# Patient Record
Sex: MALE | Race: WHITE | NOT HISPANIC OR LATINO | Employment: OTHER | URBAN - METROPOLITAN AREA
[De-identification: names, ages, dates, MRNs, and addresses within clinical notes are randomized per-mention and may not be internally consistent; named-entity substitution may affect disease eponyms.]

---

## 2023-03-29 ENCOUNTER — TELEPHONE (OUTPATIENT)
Dept: PHARMACY | Facility: HOSPITAL | Age: 68
End: 2023-03-29
Payer: MEDICARE

## 2023-03-29 RX ORDER — LOSARTAN POTASSIUM 50 MG/1
100 TABLET ORAL DAILY
COMMUNITY
End: 2023-09-14 | Stop reason: SDUPTHER

## 2023-03-29 RX ORDER — METFORMIN HYDROCHLORIDE 500 MG/1
1000 TABLET, EXTENDED RELEASE ORAL 2 TIMES DAILY
COMMUNITY
End: 2023-09-14 | Stop reason: SDUPTHER

## 2023-03-29 RX ORDER — GLIMEPIRIDE 4 MG/1
8 TABLET ORAL
COMMUNITY
End: 2023-09-14 | Stop reason: SDUPTHER

## 2023-03-29 NOTE — TELEPHONE ENCOUNTER
Toby Hicks was referred to Clinical Pharmacy Team to complete a post-discharge medication optimization and monitoring visit. The patient was referred for their diabetes.     Referring Provider: Anand Adkins MD    Reached out to patient for follow-up after last visit with Pharmacy Team on 12/30/22. Patient expressed that he is currently only on 3 medications (Metformin, Glimepiride and Losartan) and that he is no longer on Lantus. He was not able to provide blood sugar readings as he does not have any test strips. Patient politely declined Pharmacy Team services and for us to send refills for testing supplies at this time.     Please have patient contact Pharmacy Team for further assistance with diabetes management, if required.    Thank you,    Judy Montes, PharmD  PGY-1 Pharmacy Resident  Ph: [phone]

## 2023-03-30 ENCOUNTER — TELEPHONE (OUTPATIENT)
Dept: PRIMARY CARE | Facility: CLINIC | Age: 68
End: 2023-03-30
Payer: MEDICARE

## 2023-03-30 DIAGNOSIS — B02.9 HERPES ZOSTER WITHOUT COMPLICATION: Primary | ICD-10-CM

## 2023-03-30 RX ORDER — VALACYCLOVIR HYDROCHLORIDE 1 G/1
1000 TABLET, FILM COATED ORAL 3 TIMES DAILY
Qty: 21 TABLET | Refills: 0 | Status: SHIPPED | OUTPATIENT
Start: 2023-03-30 | End: 2023-04-06

## 2023-03-30 NOTE — TELEPHONE ENCOUNTER
I reviewed the progress note and agree with the resident’s findings and plans as written. Case discussed with resident.     Kiara Patrick, VincentD

## 2023-03-30 NOTE — TELEPHONE ENCOUNTER
PATIENT CALLING, HE IS CURRENTLY GOING THROUGH CHEMO/RADIATION FOR COLON CANCER. HE HAS DEVELOPED SHINGLES AND IS WANTING TO KNOW IF SOMETHING CAN BE SENT INTO THE PHARMACY FOR THE SHINGLES. PLEASE ADVISE.

## 2023-08-01 ENCOUNTER — HOSPITAL ENCOUNTER (OUTPATIENT)
Dept: DATA CONVERSION | Facility: HOSPITAL | Age: 68
End: 2023-08-01
Attending: INTERNAL MEDICINE | Admitting: INTERNAL MEDICINE
Payer: MEDICARE

## 2023-08-01 DIAGNOSIS — R91.1 SOLITARY PULMONARY NODULE: ICD-10-CM

## 2023-08-01 DIAGNOSIS — C18.9 MALIGNANT NEOPLASM OF COLON, UNSPECIFIED (MULTI): ICD-10-CM

## 2023-08-01 DIAGNOSIS — C78.02 SECONDARY MALIGNANT NEOPLASM OF LEFT LUNG (MULTI): ICD-10-CM

## 2023-08-03 LAB
COMPLETE PATHOLOGY REPORT: NORMAL
CONVERTED ADDENDUM DIAGNOSIS 2: NORMAL
CONVERTED CLINICAL DIAGNOSIS-HISTORY: NORMAL
CONVERTED FINAL DIAGNOSIS: NORMAL
CONVERTED FINAL REPORT PDF LINK TO COPY AND PASTE: NORMAL
CONVERTED GROSS DESCRIPTION: NORMAL

## 2023-09-13 PROBLEM — K59.09 CHRONIC CONSTIPATION: Status: ACTIVE | Noted: 2023-09-13

## 2023-09-13 PROBLEM — R35.1 NOCTURIA: Status: ACTIVE | Noted: 2023-09-13

## 2023-09-13 PROBLEM — R10.32 INGUINAL PAIN OF BOTH SIDES: Status: ACTIVE | Noted: 2023-09-13

## 2023-09-13 PROBLEM — S61.221A: Status: ACTIVE | Noted: 2023-09-13

## 2023-09-13 PROBLEM — G47.33 OSA (OBSTRUCTIVE SLEEP APNEA): Status: ACTIVE | Noted: 2023-09-13

## 2023-09-13 PROBLEM — J69.0: Status: ACTIVE | Noted: 2023-09-13

## 2023-09-13 PROBLEM — K76.9 LIVER LESION: Status: ACTIVE | Noted: 2023-09-13

## 2023-09-13 PROBLEM — R09.02 HYPOXIA: Status: ACTIVE | Noted: 2023-09-13

## 2023-09-13 PROBLEM — R53.83 FATIGUE: Status: ACTIVE | Noted: 2023-09-13

## 2023-09-13 PROBLEM — Z85.828 HISTORY OF BASAL CELL CARCINOMA: Status: ACTIVE | Noted: 2023-09-13

## 2023-09-13 PROBLEM — R10.31 INGUINAL PAIN OF BOTH SIDES: Status: ACTIVE | Noted: 2023-09-13

## 2023-09-13 PROBLEM — C78.7 METASTATIC COLON CANCER TO LIVER (MULTI): Status: ACTIVE | Noted: 2023-09-13

## 2023-09-13 PROBLEM — B02.9 SHINGLES: Status: ACTIVE | Noted: 2023-09-13

## 2023-09-13 PROBLEM — M19.049 DEGENERATIVE JOINT DISEASE OF HAND: Status: ACTIVE | Noted: 2023-09-13

## 2023-09-13 PROBLEM — N13.8 BPH WITH OBSTRUCTION/LOWER URINARY TRACT SYMPTOMS: Status: ACTIVE | Noted: 2023-09-13

## 2023-09-13 PROBLEM — M25.512 LEFT SHOULDER PAIN: Status: ACTIVE | Noted: 2023-09-13

## 2023-09-13 PROBLEM — M54.2 NECK PAIN: Status: ACTIVE | Noted: 2023-09-13

## 2023-09-13 PROBLEM — I10 HYPERTENSION: Status: ACTIVE | Noted: 2023-09-13

## 2023-09-13 PROBLEM — J18.9 PNEUMONIA OF BOTH LUNGS DUE TO INFECTIOUS ORGANISM: Status: ACTIVE | Noted: 2023-09-13

## 2023-09-13 PROBLEM — F41.8 DEPRESSION WITH ANXIETY: Status: ACTIVE | Noted: 2023-09-13

## 2023-09-13 PROBLEM — B35.1 ONYCHOMYCOSIS OF TOENAIL: Status: ACTIVE | Noted: 2023-09-13

## 2023-09-13 PROBLEM — R10.9 ABDOMINAL PAIN, ACUTE: Status: ACTIVE | Noted: 2023-09-13

## 2023-09-13 PROBLEM — R91.1 LUNG NODULE: Status: ACTIVE | Noted: 2023-09-13

## 2023-09-13 PROBLEM — R97.20 ELEVATED PSA: Status: ACTIVE | Noted: 2023-09-13

## 2023-09-13 PROBLEM — H91.90 DECREASED HEARING: Status: ACTIVE | Noted: 2023-09-13

## 2023-09-13 PROBLEM — E11.9 DIABETES MELLITUS (MULTI): Status: ACTIVE | Noted: 2023-09-13

## 2023-09-13 PROBLEM — C18.9 METASTATIC COLON CANCER TO LIVER (MULTI): Status: ACTIVE | Noted: 2023-09-13

## 2023-09-13 PROBLEM — N40.1 BPH WITH OBSTRUCTION/LOWER URINARY TRACT SYMPTOMS: Status: ACTIVE | Noted: 2023-09-13

## 2023-09-13 PROBLEM — L57.0 ACTINIC KERATOSIS: Status: ACTIVE | Noted: 2023-09-13

## 2023-09-13 PROBLEM — R07.89 ANTERIOR CHEST WALL PAIN: Status: ACTIVE | Noted: 2023-09-13

## 2023-09-13 PROBLEM — N48.1 BALANITIS: Status: ACTIVE | Noted: 2023-09-13

## 2023-09-13 PROBLEM — C18.9 COLON CANCER (MULTI): Status: ACTIVE | Noted: 2023-09-13

## 2023-09-13 PROBLEM — G47.00 INSOMNIA: Status: ACTIVE | Noted: 2023-09-13

## 2023-09-13 PROBLEM — E78.5 HYPERLIPIDEMIA: Status: ACTIVE | Noted: 2023-09-13

## 2023-09-13 RX ORDER — INSULIN GLARGINE 100 [IU]/ML
INJECTION, SOLUTION SUBCUTANEOUS
COMMUNITY
Start: 2022-11-30 | End: 2023-09-14 | Stop reason: SDUPTHER

## 2023-09-13 RX ORDER — ACYCLOVIR 50 MG/G
CREAM TOPICAL
Status: ON HOLD | COMMUNITY
Start: 2023-03-31 | End: 2024-01-09 | Stop reason: WASHOUT

## 2023-09-13 RX ORDER — ALBUTEROL SULFATE 90 UG/1
AEROSOL, METERED RESPIRATORY (INHALATION)
COMMUNITY
Start: 2022-11-30 | End: 2024-01-03 | Stop reason: ALTCHOICE

## 2023-09-13 RX ORDER — VIT C/E/ZN/COPPR/LUTEIN/ZEAXAN 250MG-90MG
1 CAPSULE ORAL DAILY
COMMUNITY
Start: 2022-11-30

## 2023-09-14 ENCOUNTER — OFFICE VISIT (OUTPATIENT)
Dept: PRIMARY CARE | Facility: CLINIC | Age: 68
End: 2023-09-14
Payer: MEDICARE

## 2023-09-14 VITALS
WEIGHT: 249.4 LBS | BODY MASS INDEX: 35.79 KG/M2 | RESPIRATION RATE: 20 BRPM | DIASTOLIC BLOOD PRESSURE: 80 MMHG | SYSTOLIC BLOOD PRESSURE: 124 MMHG | HEART RATE: 85 BPM | OXYGEN SATURATION: 97 % | TEMPERATURE: 98 F

## 2023-09-14 DIAGNOSIS — E11.9 TYPE 2 DIABETES MELLITUS WITHOUT COMPLICATION, WITH LONG-TERM CURRENT USE OF INSULIN (MULTI): ICD-10-CM

## 2023-09-14 DIAGNOSIS — Z79.4 TYPE 2 DIABETES MELLITUS WITHOUT COMPLICATION, WITH LONG-TERM CURRENT USE OF INSULIN (MULTI): ICD-10-CM

## 2023-09-14 DIAGNOSIS — I10 HYPERTENSION, UNSPECIFIED TYPE: ICD-10-CM

## 2023-09-14 DIAGNOSIS — E11.9 TYPE 2 DIABETES MELLITUS WITHOUT COMPLICATION, WITHOUT LONG-TERM CURRENT USE OF INSULIN (MULTI): ICD-10-CM

## 2023-09-14 PROCEDURE — 1125F AMNT PAIN NOTED PAIN PRSNT: CPT | Performed by: FAMILY MEDICINE

## 2023-09-14 PROCEDURE — 3074F SYST BP LT 130 MM HG: CPT | Performed by: FAMILY MEDICINE

## 2023-09-14 PROCEDURE — 99213 OFFICE O/P EST LOW 20 MIN: CPT | Performed by: FAMILY MEDICINE

## 2023-09-14 PROCEDURE — 3079F DIAST BP 80-89 MM HG: CPT | Performed by: FAMILY MEDICINE

## 2023-09-14 PROCEDURE — 4010F ACE/ARB THERAPY RXD/TAKEN: CPT | Performed by: FAMILY MEDICINE

## 2023-09-14 PROCEDURE — 3052F HG A1C>EQUAL 8.0%<EQUAL 9.0%: CPT | Performed by: FAMILY MEDICINE

## 2023-09-14 PROCEDURE — 1159F MED LIST DOCD IN RCRD: CPT | Performed by: FAMILY MEDICINE

## 2023-09-14 RX ORDER — INSULIN GLARGINE 100 [IU]/ML
INJECTION, SOLUTION SUBCUTANEOUS
Qty: 15 EACH | Refills: 3 | Status: SHIPPED | OUTPATIENT
Start: 2023-09-14 | End: 2023-12-04

## 2023-09-14 RX ORDER — BLOOD-GLUCOSE METER
EACH MISCELLANEOUS
Qty: 200 STRIP | Refills: 1 | Status: SHIPPED | OUTPATIENT
Start: 2023-09-14

## 2023-09-14 RX ORDER — METFORMIN HYDROCHLORIDE 500 MG/1
1000 TABLET, EXTENDED RELEASE ORAL 2 TIMES DAILY
Qty: 180 TABLET | Refills: 1 | Status: SHIPPED | OUTPATIENT
Start: 2023-09-14 | End: 2023-11-15

## 2023-09-14 RX ORDER — LOSARTAN POTASSIUM 50 MG/1
100 TABLET ORAL DAILY
Qty: 90 TABLET | Refills: 1 | Status: SHIPPED | OUTPATIENT
Start: 2023-09-14 | End: 2023-11-15

## 2023-09-14 RX ORDER — GLIMEPIRIDE 4 MG/1
8 TABLET ORAL
Qty: 90 TABLET | Refills: 1 | Status: SHIPPED | OUTPATIENT
Start: 2023-09-14 | End: 2023-11-15

## 2023-09-14 RX ORDER — INSULIN GLARGINE 100 [IU]/ML
40 INJECTION, SOLUTION SUBCUTANEOUS NIGHTLY
Qty: 3 EACH | Refills: 3 | Status: SHIPPED | OUTPATIENT
Start: 2023-09-14 | End: 2023-09-14 | Stop reason: SDUPTHER

## 2023-09-14 NOTE — TELEPHONE ENCOUNTER
PATIENT IN FOR APPT TODAY - FORGOT TO ASK FOR ONE TOUCH VERIO TEST STRIPS - TESTS TWICE A DAY - MARCS NR  
Speaking Coherently

## 2023-09-14 NOTE — PROGRESS NOTES
Subjective   Patient ID: Toby Hicks is a 68 y.o. male who presents for Diabetes and Cancer.    HPI  Pt would like to discuss above concerns  Pt does check sugar levels at home  Averages: 200s  Pt has been on steroids recently as well  Tuesday pt was 454 at [facility]    Pt also dx' with colon and lung cancer  Pt seeing Dr. Palacios, Oncology Watching both cancers  Lung cancer was found in April  This will be 2nd treatment of latest chemo rounds    No other concerns today    Review of Systems  Constitutional:  no chills, no fever and no night sweats.  Eyes: no blurred vision and no eyesight problems.  ENT: no hearing loss, no nasal congestion, no hoarseness and no sore throat.  Neck: no mass (es) and no swelling.  Cardiovascular: no chest pain, no intermittent leg claudication, no lower extremity edema, no palpitation and no syncope.  Respiratory: no cough, no shortness of breath during exertion, no shortness of breath at rest and no wheezing.  Gastrointestinal: no abdominal pain, no blood in stools, no constipation, no diarrhea, no melena, no nausea, no rectal pain and no vomiting.  Genitourinary: no dysuria, no change in urinary frequency, no urinary hesitancy and no feelings of urinary urgency.  Musculoskeletal: no arthralgias, no back pain and no myalgias.  Integumentary: no new skin lesions and no rashes.  Neurological: no difficulty walking, no headache, no limb weakness, no numbness and no tingling.  Psychiatric/Behavioral: no anxiety, no depression, no anhedonia and no substance use disorders.  Endocrine: no recent weight gain and no recent weight loss.  Hematologic/Lymphatic: no tendency for easy bruising and no swollen glands    Objective   Physical Exam  Patient in for follow-up of metastatic colon cancer known as long finishing round 2 of 12 of chemotherapy.  Been on prednisone because of that his blood sugars have been elevated he had been off his Lantus will need to restart that blood sugars  occasionally in the 400s this morning it was 210.  Well-developed well-nourished male in no acute distress states he is doing okay with occasional.  Fatigued and a little foggy headed but otherwise no other physical complaints.  Physical exam today's office visit constitutional alert and oriented x3.    Head is atraumatic HEENT is within normal limits.    Neck supple no masses full range of motion.    Thyroid is normal in size no thyromegaly there is no carotid bruits.    Pulmonary exam shows clear to auscultation no respiratory distress.    Cardiovascular shows no murmur rub or gallop.  Regular rate and rhythm.    Abdominal exam soft nontender no hepatosplenomegaly or masses normal bowel sounds no rebound no guarding.    Musculoskeletal exam no joint pain no muscle pain full range of motion.    Psych exam normal mood and affect.    Dermatologic exam no skin lesions no rash no blemishes.    Neuro exam is no focal deficits.  Normal exam.    Extremities no edema normal pulses normal capillary refill.    /80   Pulse 85   Temp 36.7 °C (98 °F)   Resp 20   Wt 113 kg (249 lb 6.4 oz)   SpO2 97%   BMI 35.79 kg/m²     Lab Results   Component Value Date    WBC 9.3 09/12/2023    HGB 13.9 09/12/2023    HCT 41.2 09/12/2023    MCV 90 09/12/2023     09/12/2023       Assessment/Plan plan is to restart Lantus 40 units nightly monitor his blood sugar refill his test strips he follows up with heme-onc next week await their evaluation I will follow-up with him in 3 months check an A1c  Problem List Items Addressed This Visit       Diabetes mellitus (CMS/HCC)    Hypertension

## 2023-09-25 ENCOUNTER — TELEPHONE (OUTPATIENT)
Dept: PHARMACY | Facility: HOSPITAL | Age: 68
End: 2023-09-25
Payer: MEDICARE

## 2023-09-25 NOTE — TELEPHONE ENCOUNTER
I reviewed the progress note and agree with the resident’s findings and plans as written. Case discussed with resident.    Kev Robert, PharmD

## 2023-09-25 NOTE — TELEPHONE ENCOUNTER
Population Health: Outreach by Ambulatory Pharmacy Team    Payor: [employer] [place]  Reason: Adherence  Medication: LOSARTAN POT TAB 50MG  Outcome: Patient Reached: Claims Adherence, received fill a few days ago    ANGELITA AMAYA, PharmD

## 2023-09-26 DIAGNOSIS — C78.7 METASTATIC COLON CANCER TO LIVER (MULTI): Primary | ICD-10-CM

## 2023-09-26 DIAGNOSIS — C18.9 METASTATIC COLON CANCER TO LIVER (MULTI): Primary | ICD-10-CM

## 2023-09-26 RX ORDER — HEPARIN 100 UNIT/ML
500 SYRINGE INTRAVENOUS AS NEEDED
Status: CANCELLED | OUTPATIENT
Start: 2023-10-10

## 2023-09-26 RX ORDER — HEPARIN SODIUM,PORCINE/PF 10 UNIT/ML
50 SYRINGE (ML) INTRAVENOUS AS NEEDED
Status: CANCELLED | OUTPATIENT
Start: 2023-10-10

## 2023-09-28 DIAGNOSIS — C18.9 MALIGNANT NEOPLASM OF COLON, UNSPECIFIED PART OF COLON (MULTI): Primary | ICD-10-CM

## 2023-09-28 DIAGNOSIS — C18.9 METASTATIC COLON CANCER TO LIVER (MULTI): ICD-10-CM

## 2023-09-28 DIAGNOSIS — C78.7 METASTATIC COLON CANCER TO LIVER (MULTI): ICD-10-CM

## 2023-09-29 VITALS — WEIGHT: 248.9 LBS | BODY MASS INDEX: 35.63 KG/M2 | HEIGHT: 70 IN

## 2023-09-30 DIAGNOSIS — C18.9 METASTATIC COLON CANCER TO LIVER (MULTI): ICD-10-CM

## 2023-09-30 DIAGNOSIS — C18.9 MALIGNANT NEOPLASM OF COLON, UNSPECIFIED PART OF COLON (MULTI): Primary | ICD-10-CM

## 2023-09-30 DIAGNOSIS — C78.7 METASTATIC COLON CANCER TO LIVER (MULTI): ICD-10-CM

## 2023-09-30 RX ORDER — PALONOSETRON 0.05 MG/ML
250 INJECTION, SOLUTION INTRAVENOUS ONCE
Status: CANCELLED
Start: 2023-10-13

## 2023-09-30 RX ORDER — EPINEPHRINE 0.3 MG/.3ML
0.3 INJECTION SUBCUTANEOUS EVERY 5 MIN PRN
Status: CANCELLED | OUTPATIENT
Start: 2023-10-11

## 2023-09-30 RX ORDER — FLUOROURACIL 50 MG/ML
400 INJECTION, SOLUTION INTRAVENOUS ONCE
Status: CANCELLED | OUTPATIENT
Start: 2023-10-11

## 2023-09-30 RX ORDER — FAMOTIDINE 10 MG/ML
20 INJECTION INTRAVENOUS ONCE AS NEEDED
Status: CANCELLED | OUTPATIENT
Start: 2023-10-11

## 2023-09-30 RX ORDER — ATROPINE SULFATE 0.4 MG/ML
0.4 INJECTION, SOLUTION ENDOTRACHEAL; INTRAMEDULLARY; INTRAMUSCULAR; INTRAVENOUS; SUBCUTANEOUS
Status: CANCELLED | OUTPATIENT
Start: 2023-10-11

## 2023-09-30 RX ORDER — PROCHLORPERAZINE MALEATE 10 MG
10 TABLET ORAL EVERY 6 HOURS PRN
Status: CANCELLED | OUTPATIENT
Start: 2023-10-11

## 2023-09-30 RX ORDER — FAMOTIDINE 10 MG/ML
20 INJECTION INTRAVENOUS ONCE
Status: CANCELLED
Start: 2023-10-11 | End: 2023-10-10

## 2023-09-30 RX ORDER — ALBUTEROL SULFATE 0.83 MG/ML
3 SOLUTION RESPIRATORY (INHALATION) AS NEEDED
Status: CANCELLED | OUTPATIENT
Start: 2023-10-11

## 2023-09-30 RX ORDER — DIPHENHYDRAMINE HYDROCHLORIDE 50 MG/ML
50 INJECTION INTRAMUSCULAR; INTRAVENOUS AS NEEDED
Status: CANCELLED | OUTPATIENT
Start: 2023-10-11

## 2023-10-02 ENCOUNTER — APPOINTMENT (OUTPATIENT)
Dept: HEMATOLOGY/ONCOLOGY | Facility: CLINIC | Age: 68
End: 2023-10-02
Payer: MEDICARE

## 2023-10-04 ENCOUNTER — TELEPHONE (OUTPATIENT)
Dept: HEMATOLOGY/ONCOLOGY | Facility: CLINIC | Age: 68
End: 2023-10-04
Payer: MEDICARE

## 2023-10-04 NOTE — TELEPHONE ENCOUNTER
Spoke with the patient. Provided update from Dr. Palacios below. Aware that a new FUV should be scheduled in the next few days. Pt was most appreciative and had no further questions or concerns at this time.

## 2023-10-08 PROBLEM — E11.9 DIABETES MELLITUS, TYPE 2 (MULTI): Status: ACTIVE | Noted: 2023-10-08

## 2023-10-08 PROBLEM — Z04.9 CONDITION NOT FOUND: Status: ACTIVE | Noted: 2023-10-08

## 2023-10-08 RX ORDER — BENZALKONIUM CHLORIDE 1.3 MG/ML
CLOTH TOPICAL 3 TIMES DAILY
Status: ON HOLD | COMMUNITY
Start: 2023-03-31 | End: 2024-01-09 | Stop reason: WASHOUT

## 2023-10-08 RX ORDER — ACETAMINOPHEN 325 MG/1
2-3 TABLET ORAL DAILY PRN
Status: ON HOLD | COMMUNITY
Start: 2022-03-24 | End: 2024-01-09 | Stop reason: WASHOUT

## 2023-10-08 RX ORDER — ZINC SULFATE 50(220)MG
1 CAPSULE ORAL DAILY
COMMUNITY

## 2023-10-08 RX ORDER — FLAXSEED OIL 1000 MG
1 CAPSULE ORAL DAILY
COMMUNITY

## 2023-10-08 RX ORDER — LANCETS
EACH MISCELLANEOUS
Status: ON HOLD | COMMUNITY
Start: 2022-11-29 | End: 2024-01-09 | Stop reason: WASHOUT

## 2023-10-08 RX ORDER — OMEGA-3 FATTY ACIDS/FISH OIL 340-1000MG
1 CAPSULE ORAL DAILY
COMMUNITY

## 2023-10-08 RX ORDER — PREDNISONE 20 MG/1
1 TABLET ORAL 2 TIMES DAILY
COMMUNITY
Start: 2023-03-31 | End: 2024-01-03 | Stop reason: ALTCHOICE

## 2023-10-08 RX ORDER — PANTOPRAZOLE SODIUM 40 MG/1
40 TABLET, DELAYED RELEASE ORAL
COMMUNITY
Start: 2023-01-20 | End: 2024-01-03 | Stop reason: ALTCHOICE

## 2023-10-10 ENCOUNTER — INFUSION (OUTPATIENT)
Dept: HEMATOLOGY/ONCOLOGY | Facility: CLINIC | Age: 68
End: 2023-10-10
Payer: MEDICARE

## 2023-10-10 ENCOUNTER — OFFICE VISIT (OUTPATIENT)
Dept: HEMATOLOGY/ONCOLOGY | Facility: CLINIC | Age: 68
End: 2023-10-10
Payer: MEDICARE

## 2023-10-10 VITALS
SYSTOLIC BLOOD PRESSURE: 144 MMHG | OXYGEN SATURATION: 95 % | TEMPERATURE: 97.3 F | WEIGHT: 242.95 LBS | BODY MASS INDEX: 34.78 KG/M2 | RESPIRATION RATE: 18 BRPM | HEIGHT: 70 IN | HEART RATE: 83 BPM | DIASTOLIC BLOOD PRESSURE: 85 MMHG

## 2023-10-10 DIAGNOSIS — E11.65 TYPE 2 DIABETES MELLITUS WITH HYPERGLYCEMIA, WITH LONG-TERM CURRENT USE OF INSULIN (MULTI): ICD-10-CM

## 2023-10-10 DIAGNOSIS — I10 PRIMARY HYPERTENSION: ICD-10-CM

## 2023-10-10 DIAGNOSIS — C78.7 METASTATIC COLON CANCER TO LIVER (MULTI): Primary | ICD-10-CM

## 2023-10-10 DIAGNOSIS — C18.9 MALIGNANT NEOPLASM OF COLON, UNSPECIFIED PART OF COLON (MULTI): ICD-10-CM

## 2023-10-10 DIAGNOSIS — C78.7 METASTATIC COLON CANCER TO LIVER (MULTI): ICD-10-CM

## 2023-10-10 DIAGNOSIS — C18.9 METASTATIC COLON CANCER TO LIVER (MULTI): ICD-10-CM

## 2023-10-10 DIAGNOSIS — R97.20 ELEVATED PSA: ICD-10-CM

## 2023-10-10 DIAGNOSIS — D84.821 IMMUNODEFICIENCY DUE TO DRUGS (CODE) (MULTI): ICD-10-CM

## 2023-10-10 DIAGNOSIS — C18.9 MALIGNANT NEOPLASM OF COLON, UNSPECIFIED PART OF COLON (MULTI): Primary | ICD-10-CM

## 2023-10-10 DIAGNOSIS — C18.9 METASTATIC COLON CANCER TO LIVER (MULTI): Primary | ICD-10-CM

## 2023-10-10 DIAGNOSIS — Z79.4 TYPE 2 DIABETES MELLITUS WITH HYPERGLYCEMIA, WITH LONG-TERM CURRENT USE OF INSULIN (MULTI): ICD-10-CM

## 2023-10-10 LAB
ALBUMIN SERPL BCP-MCNC: 4 G/DL (ref 3.4–5)
ALP SERPL-CCNC: 144 U/L (ref 33–136)
ALT SERPL W P-5'-P-CCNC: 18 U/L (ref 10–52)
ANION GAP SERPL CALC-SCNC: 13 MMOL/L (ref 10–20)
AST SERPL W P-5'-P-CCNC: 11 U/L (ref 9–39)
BASOPHILS # BLD AUTO: 0.05 X10*3/UL (ref 0–0.1)
BASOPHILS NFR BLD AUTO: 0.4 %
BILIRUB SERPL-MCNC: 0.3 MG/DL (ref 0–1.2)
BUN SERPL-MCNC: 15 MG/DL (ref 6–23)
CALCIUM SERPL-MCNC: 9.4 MG/DL (ref 8.6–10.3)
CHLORIDE SERPL-SCNC: 103 MMOL/L (ref 98–107)
CO2 SERPL-SCNC: 26 MMOL/L (ref 21–32)
CREAT SERPL-MCNC: 1.02 MG/DL (ref 0.5–1.3)
EOSINOPHIL # BLD AUTO: 0.18 X10*3/UL (ref 0–0.7)
EOSINOPHIL NFR BLD AUTO: 1.3 %
ERYTHROCYTE [DISTWIDTH] IN BLOOD BY AUTOMATED COUNT: 15.7 % (ref 11.5–14.5)
GFR SERPL CREATININE-BSD FRML MDRD: 80 ML/MIN/1.73M*2
GLUCOSE SERPL-MCNC: 276 MG/DL (ref 74–99)
HCT VFR BLD AUTO: 39.4 % (ref 41–52)
HGB BLD-MCNC: 12.8 G/DL (ref 13.5–17.5)
IMM GRANULOCYTES # BLD AUTO: 0.55 X10*3/UL (ref 0–0.7)
IMM GRANULOCYTES NFR BLD AUTO: 3.9 % (ref 0–0.9)
LYMPHOCYTES # BLD AUTO: 2.01 X10*3/UL (ref 1.2–4.8)
LYMPHOCYTES NFR BLD AUTO: 14.3 %
MCH RBC QN AUTO: 30.3 PG (ref 26–34)
MCHC RBC AUTO-ENTMCNC: 32.5 G/DL (ref 32–36)
MCV RBC AUTO: 93 FL (ref 80–100)
MONOCYTES # BLD AUTO: 0.8 X10*3/UL (ref 0.1–1)
MONOCYTES NFR BLD AUTO: 5.7 %
NEUTROPHILS # BLD AUTO: 10.48 X10*3/UL (ref 1.2–7.7)
NEUTROPHILS NFR BLD AUTO: 74.4 %
PLATELET # BLD AUTO: 269 X10*3/UL (ref 150–450)
PMV BLD AUTO: 10.2 FL (ref 7.5–11.5)
POTASSIUM SERPL-SCNC: 4.1 MMOL/L (ref 3.5–5.3)
PROT SERPL-MCNC: 6.6 G/DL (ref 6.4–8.2)
RBC # BLD AUTO: 4.22 X10*6/UL (ref 4.5–5.9)
SODIUM SERPL-SCNC: 138 MMOL/L (ref 136–145)
WBC # BLD AUTO: 14.1 X10*3/UL (ref 4.4–11.3)

## 2023-10-10 PROCEDURE — 99214 OFFICE O/P EST MOD 30 MIN: CPT | Performed by: INTERNAL MEDICINE

## 2023-10-10 PROCEDURE — 3079F DIAST BP 80-89 MM HG: CPT | Performed by: INTERNAL MEDICINE

## 2023-10-10 PROCEDURE — 4010F ACE/ARB THERAPY RXD/TAKEN: CPT | Performed by: INTERNAL MEDICINE

## 2023-10-10 PROCEDURE — 85025 COMPLETE CBC W/AUTO DIFF WBC: CPT

## 2023-10-10 PROCEDURE — 3052F HG A1C>EQUAL 8.0%<EQUAL 9.0%: CPT | Performed by: INTERNAL MEDICINE

## 2023-10-10 PROCEDURE — 82378 CARCINOEMBRYONIC ANTIGEN: CPT

## 2023-10-10 PROCEDURE — 3077F SYST BP >= 140 MM HG: CPT | Performed by: INTERNAL MEDICINE

## 2023-10-10 PROCEDURE — 80053 COMPREHEN METABOLIC PANEL: CPT

## 2023-10-10 PROCEDURE — 1126F AMNT PAIN NOTED NONE PRSNT: CPT | Performed by: INTERNAL MEDICINE

## 2023-10-10 PROCEDURE — 1036F TOBACCO NON-USER: CPT | Performed by: INTERNAL MEDICINE

## 2023-10-10 PROCEDURE — 36591 DRAW BLOOD OFF VENOUS DEVICE: CPT

## 2023-10-10 PROCEDURE — 1159F MED LIST DOCD IN RCRD: CPT | Performed by: INTERNAL MEDICINE

## 2023-10-10 RX ORDER — FAMOTIDINE 10 MG/ML
20 INJECTION INTRAVENOUS ONCE AS NEEDED
Status: CANCELLED | OUTPATIENT
Start: 2023-10-25

## 2023-10-10 RX ORDER — FAMOTIDINE 10 MG/ML
20 INJECTION INTRAVENOUS ONCE
Status: CANCELLED
Start: 2023-10-25 | End: 2023-10-24

## 2023-10-10 RX ORDER — FLUOROURACIL 50 MG/ML
400 INJECTION, SOLUTION INTRAVENOUS ONCE
Status: CANCELLED | OUTPATIENT
Start: 2023-10-25

## 2023-10-10 RX ORDER — EPINEPHRINE 0.3 MG/.3ML
0.3 INJECTION SUBCUTANEOUS EVERY 5 MIN PRN
Status: CANCELLED | OUTPATIENT
Start: 2023-10-25

## 2023-10-10 RX ORDER — DIPHENHYDRAMINE HYDROCHLORIDE 50 MG/ML
50 INJECTION INTRAMUSCULAR; INTRAVENOUS AS NEEDED
Status: CANCELLED | OUTPATIENT
Start: 2023-10-25

## 2023-10-10 RX ORDER — ATROPINE SULFATE 0.4 MG/ML
0.4 INJECTION, SOLUTION ENDOTRACHEAL; INTRAMEDULLARY; INTRAMUSCULAR; INTRAVENOUS; SUBCUTANEOUS
Status: CANCELLED | OUTPATIENT
Start: 2023-10-25

## 2023-10-10 RX ORDER — PROCHLORPERAZINE MALEATE 10 MG
10 TABLET ORAL EVERY 6 HOURS PRN
Status: CANCELLED | OUTPATIENT
Start: 2023-10-25

## 2023-10-10 RX ORDER — PALONOSETRON 0.05 MG/ML
250 INJECTION, SOLUTION INTRAVENOUS ONCE
Status: CANCELLED
Start: 2023-10-27 | End: 2023-10-26

## 2023-10-10 RX ORDER — ALBUTEROL SULFATE 0.83 MG/ML
3 SOLUTION RESPIRATORY (INHALATION) AS NEEDED
Status: CANCELLED | OUTPATIENT
Start: 2023-10-25

## 2023-10-10 ASSESSMENT — PAIN SCALES - GENERAL: PAINLEVEL: 0-NO PAIN

## 2023-10-10 NOTE — PROGRESS NOTES
"Patient ID: Toby Hicks is a 68 y.o. male.  Referring Physician: Luis Palacios MD  [address]  [place] [id number]  Primary Care Provider: Anand Adkins MD  Visit Type: Follow Up      Subjective    HPI  I am paying a little bit more attention to my sugars    Review of Systems   Constitutional: Negative.    HENT:  Negative.     Eyes: Negative.    Respiratory: Negative.     Cardiovascular: Negative.    Gastrointestinal: Negative.    Endocrine: Negative.    Genitourinary: Negative.     Musculoskeletal: Negative.    Skin: Negative.    Neurological: Negative.    Hematological: Negative.    Psychiatric/Behavioral:  Positive for confusion and decreased concentration.         Objective   BSA: 2.33 meters squared  /85   Pulse 83   Temp 36.3 °C (97.3 °F)   Resp 18   Ht (S) 1.784 m (5' 10.24\")   Wt 110 kg (242 lb 15.2 oz)   SpO2 95%   BMI 34.63 kg/m²      has a past medical history of Personal history of other diseases of the circulatory system, Personal history of other endocrine, nutritional and metabolic disease, Personal history of other malignant neoplasm of skin, and Type 2 diabetes mellitus without complications (CMS/HCC) (09/12/2020).   has a past surgical history that includes Other surgical history (04/09/2014); Tonsillectomy (04/09/2014); Other surgical history (09/30/2021); Other surgical history (09/30/2021); Other surgical history (09/12/2020); CT guided percutaneous biopsy liver (2/24/2022); and CT guided percutaneous biopsy lung (8/1/2023).  No family history on file.  Oncology History   Colon cancer (CMS/HCC)   8/29/2023 -  Chemotherapy    FOLFIRI (Fluorouracil Continuous Infusion / Leucovorin / Irinotecan), 14 Day Cycles     9/13/2023 Initial Diagnosis    Colon cancer (CMS/HCC)     Metastatic colon cancer to liver (CMS/HCC)   8/29/2023 -  Chemotherapy    FOLFIRI (Fluorouracil Continuous Infusion / Leucovorin / Irinotecan), 14 Day Cycles     9/13/2023 Initial " Diagnosis    Metastatic colon cancer to liver (CMS/HCC)         Toby Hicks  reports that he quit smoking about 35 years ago. His smoking use included cigarettes. He has never used smokeless tobacco.  He  reports no history of alcohol use.  He  reports no history of drug use.    Physical Exam  Vitals reviewed.   Constitutional:       Appearance: Normal appearance.   HENT:      Mouth/Throat:      Mouth: Mucous membranes are moist.   Eyes:      Extraocular Movements: Extraocular movements intact.   Cardiovascular:      Rate and Rhythm: Normal rate and regular rhythm.      Heart sounds: Normal heart sounds.   Pulmonary:      Breath sounds: Normal breath sounds.      Comments: Chest wall portacath  Abdominal:      General: Bowel sounds are normal.      Palpations: Abdomen is soft.   Musculoskeletal:      Cervical back: Normal range of motion and neck supple.   Skin:     General: Skin is warm and dry.   Neurological:      General: No focal deficit present.      Mental Status: He is alert and oriented to person, place, and time.   Psychiatric:         Mood and Affect: Mood normal.         Behavior: Behavior normal.         WBC   Date/Time Value Ref Range Status   09/26/2023 10:03 AM 17.4 (H) 4.4 - 11.3 x10E9/L Final   09/12/2023 08:50 AM 9.3 4.4 - 11.3 x10E9/L Final   08/22/2023 12:32 PM 6.8 4.4 - 11.3 x10E9/L Final     nRBC   Date Value Ref Range Status   04/07/2023 0.0 0.0 - 0.0 /100 WBC Final   11/29/2022 0.0 0.0 - 0.0 /100 WBC Final   11/28/2022 0.0 0.0 - 0.0 /100 WBC Final     RBC   Date Value Ref Range Status   09/26/2023 4.18 (L) 4.50 - 5.90 x10E12/L Final   09/12/2023 4.60 4.50 - 5.90 x10E12/L Final   08/22/2023 4.54 4.50 - 5.90 x10E12/L Final     Hemoglobin   Date Value Ref Range Status   09/26/2023 12.7 (L) 13.5 - 17.5 g/dL Final   09/12/2023 13.9 13.5 - 17.5 g/dL Final   08/22/2023 13.7 13.5 - 17.5 g/dL Final     Hematocrit   Date Value Ref Range Status   09/26/2023 39.0 (L) 41.0 - 52.0 % Final  "  09/12/2023 41.2 41.0 - 52.0 % Final   08/22/2023 40.0 (L) 41.0 - 52.0 % Final     MCV   Date/Time Value Ref Range Status   09/26/2023 10:03 AM 93 80 - 100 fL Final   09/12/2023 08:50 AM 90 80 - 100 fL Final   08/22/2023 12:32 PM 88 80 - 100 fL Final     No results found for: \"MCH\"  MCHC   Date/Time Value Ref Range Status   09/26/2023 10:03 AM 32.6 32.0 - 36.0 g/dL Final   09/12/2023 08:50 AM 33.7 32.0 - 36.0 g/dL Final   08/22/2023 12:32 PM 34.3 32.0 - 36.0 g/dL Final     RDW   Date/Time Value Ref Range Status   09/26/2023 10:03 AM 15.6 (H) 11.5 - 14.5 % Final   09/12/2023 08:50 AM 14.4 11.5 - 14.5 % Final   08/22/2023 12:32 PM 13.3 11.5 - 14.5 % Final     Platelets   Date/Time Value Ref Range Status   09/26/2023 10:03  150 - 450 x10E9/L Final   09/12/2023 08:50  150 - 450 x10E9/L Final   08/22/2023 12:32  150 - 450 x10E9/L Final     No results found for: \"MPV\"  Neutrophils %   Date/Time Value Ref Range Status   09/26/2023 10:03 AM 78.5 40.0 - 80.0 % Final   09/12/2023 08:50 AM 70.5 40.0 - 80.0 % Final   08/22/2023 12:32 PM 64.9 40.0 - 80.0 % Final     Immature Granulocytes %, Automated   Date/Time Value Ref Range Status   09/26/2023 10:03 AM 3.3 (H) 0.0 - 0.9 % Final     Comment:      Immature Granulocyte Count (IG) includes promyelocytes,    myelocytes and metamyelocytes but does not include bands.   Percent differential counts (%) should be interpreted in the   context of the absolute cell counts (cells/L).     09/12/2023 08:50 AM 1.2 (H) 0.0 - 0.9 % Final     Comment:      Immature Granulocyte Count (IG) includes promyelocytes,    myelocytes and metamyelocytes but does not include bands.   Percent differential counts (%) should be interpreted in the   context of the absolute cell counts (cells/L).     08/22/2023 12:32 PM 0.1 0.0 - 0.9 % Final     Comment:      Immature Granulocyte Count (IG) includes promyelocytes,    myelocytes and metamyelocytes but does not include bands.   Percent " "differential counts (%) should be interpreted in the   context of the absolute cell counts (cells/L).       Lymphocytes %   Date/Time Value Ref Range Status   09/26/2023 10:03 AM 12.8 13.0 - 44.0 % Final   09/12/2023 08:50 AM 20.7 13.0 - 44.0 % Final   08/22/2023 12:32 PM 25.8 13.0 - 44.0 % Final   10/03/2022 11:43 AM 43.0 13.0 - 44.0 % Final     Monocytes %   Date/Time Value Ref Range Status   09/26/2023 10:03 AM 4.4 2.0 - 10.0 % Final   09/12/2023 08:50 AM 5.5 2.0 - 10.0 % Final   08/22/2023 12:32 PM 6.6 2.0 - 10.0 % Final   10/03/2022 11:43 AM 18.0 2.0 - 10.0 % Final     Eosinophils %   Date/Time Value Ref Range Status   09/26/2023 10:03 AM 0.7 0.0 - 6.0 % Final   09/12/2023 08:50 AM 1.6 0.0 - 6.0 % Final   08/22/2023 12:32 PM 2.5 0.0 - 6.0 % Final   10/03/2022 11:43 AM 4.0 0.0 - 6.0 % Final     Basophils %   Date/Time Value Ref Range Status   09/26/2023 10:03 AM 0.3 0.0 - 2.0 % Final   09/12/2023 08:50 AM 0.5 0.0 - 2.0 % Final   08/22/2023 12:32 PM 0.1 0.0 - 2.0 % Final   10/03/2022 11:43 AM 0.0 0.0 - 2.0 % Final     Neutrophils Absolute   Date/Time Value Ref Range Status   09/26/2023 10:03 AM 13.65 (H) 1.20 - 7.70 x10E9/L Final   09/12/2023 08:50 AM 6.58 1.20 - 7.70 x10E9/L Final   08/22/2023 12:32 PM 4.38 1.20 - 7.70 x10E9/L Final     No results found for: \"IGABSOL\"  Lymphocytes Absolute   Date/Time Value Ref Range Status   09/26/2023 10:03 AM 2.23 1.20 - 4.80 x10E9/L Final   09/12/2023 08:50 AM 1.93 1.20 - 4.80 x10E9/L Final   08/22/2023 12:32 PM 1.75 1.20 - 4.80 x10E9/L Final     Monocytes Absolute   Date/Time Value Ref Range Status   09/26/2023 10:03 AM 0.77 0.10 - 1.00 x10E9/L Final   09/12/2023 08:50 AM 0.51 0.10 - 1.00 x10E9/L Final   08/22/2023 12:32 PM 0.45 0.10 - 1.00 x10E9/L Final     Eosinophils Absolute   Date/Time Value Ref Range Status   09/26/2023 10:03 AM 0.12 0.00 - 0.70 x10E9/L Final   09/12/2023 08:50 AM 0.15 0.00 - 0.70 x10E9/L Final   08/22/2023 12:32 PM 0.17 0.00 - 0.70 x10E9/L Final " "  10/03/2022 11:43 AM 0.10 0.00 - 0.70 x10E9/L Final     Basophils Absolute   Date/Time Value Ref Range Status   09/26/2023 10:03 AM 0.05 0.00 - 0.10 x10E9/L Final   09/12/2023 08:50 AM 0.05 0.00 - 0.10 x10E9/L Final   08/22/2023 12:32 PM 0.01 0.00 - 0.10 x10E9/L Final   10/03/2022 11:43 AM 0.00 0.00 - 0.10 x10E9/L Final       No components found for: \"PT\"  aPTT   Date/Time Value Ref Range Status   06/16/2022 01:53 PM 30 26 - 39 sec Final     Comment:       THE APTT IS NO LONGER USED FOR MONITORING     UNFRACTIONATED HEPARIN THERAPY.    FOR MONITORING HEPARIN THERAPY,     USE THE HEPARIN ASSAY.     04/16/2022 02:41 PM 23 (L) 26 - 39 sec Final     Comment:       THE APTT IS NO LONGER USED FOR MONITORING     UNFRACTIONATED HEPARIN THERAPY.    FOR MONITORING HEPARIN THERAPY,     USE THE HEPARIN ASSAY.         Assessment/Plan    1) metastatic colon cancer  -had liver metastases at outset of diagnosis  -s/p FOLFOX x 12  -then relapsed in lungs  -now on FOLFIRI, here for cycle #4  -port was accessed today to check CBC, COMP, CEA  -results reviewed--wbc 14.1, hgb 12.8, plt 269,000, glucose 276, creatinine 1.02, CEA 2.8  -unfortunately it took so long to get his lab orders accessed due to the Epic transition, that he decided to return tomorrow for FOLFIRI  -benefits, risks, potential morbidity related to FOLFIRI were reviewed with Toby and he provided informed consent to proceed  -on day 1 he will receive ondansetron IV + decadron IV + pepcid IV + atropine IV followed by irinotecan 180 mg/m2 IV + leucovorin 400 mg/m2 IV + 5FU 400 mg/m2 IV push then protracted 5FU 2400 mg/m2 CIV over 46 hours  -on day 3 he will receive aloxi IV + NS 1000 ml IV + onpro neulasta  -he will have a new CT scan done next week to assess for response    2) elevated PSA  -last PSA was 6.98 (1/16/2023)    3) diabetes  -on metformin  -on glimepiride  -on insulin lantus    4) hypertension  -on losartan     Problem List Items Addressed This Visit       "       ICD-10-CM    Colon cancer (CMS/HCC) - Primary C18.9    Relevant Orders    CBC and Auto Differential    Comprehensive metabolic panel    CEA (Carcinoembryonic Antigen)    Clinic Appointment Request Chemo Follow Up; LUIS PALACIOS; [facility]    Infusion Appointment Request [facility]    CBC and Auto Differential    Comprehensive metabolic panel    CEA (Carcinoembryonic Antigen)    CT chest abdomen pelvis w IV contrast    Clinic Appointment Request [facility] Follow [facility]    [facility] ([facility]    CBC and Auto Differential    Comprehensive metabolic panel    CEA (Carcinoembryonic Antigen)    Metastatic colon cancer to liver (CMS/HCC) C18.9, C78.7    Relevant Orders    CBC and Auto Differential    Comprehensive metabolic panel    CEA (Carcinoembryonic Antigen)    Clinic Appointment Request Chemo Follow Up; LUIS PALACIOS; [facility]    Infusion Appointment Request [facility]    CBC and Auto Differential    Comprehensive metabolic panel    CEA (Carcinoembryonic Antigen)    CT chest abdomen pelvis w IV contrast    Clinic Appointment Request Chemo Follow Up; LUIS PALACIOS; [facility]    [facility] (Completed)    CBC and Auto Differential    Comprehensive metabolic panel    CEA (Carcinoembryonic Antigen)            Luis Palacios MD

## 2023-10-11 ENCOUNTER — INFUSION (OUTPATIENT)
Dept: HEMATOLOGY/ONCOLOGY | Facility: CLINIC | Age: 68
End: 2023-10-11
Payer: MEDICARE

## 2023-10-11 VITALS
BODY MASS INDEX: 35.69 KG/M2 | WEIGHT: 250.44 LBS | DIASTOLIC BLOOD PRESSURE: 87 MMHG | TEMPERATURE: 97.3 F | RESPIRATION RATE: 16 BRPM | SYSTOLIC BLOOD PRESSURE: 137 MMHG | OXYGEN SATURATION: 97 % | HEART RATE: 91 BPM

## 2023-10-11 DIAGNOSIS — C78.7 METASTATIC COLON CANCER TO LIVER (MULTI): ICD-10-CM

## 2023-10-11 DIAGNOSIS — C18.9 MALIGNANT NEOPLASM OF COLON, UNSPECIFIED PART OF COLON (MULTI): ICD-10-CM

## 2023-10-11 DIAGNOSIS — C18.9 METASTATIC COLON CANCER TO LIVER (MULTI): ICD-10-CM

## 2023-10-11 LAB — CEA SERPL-MCNC: 2.8 UG/L

## 2023-10-11 PROCEDURE — 96376 TX/PRO/DX INJ SAME DRUG ADON: CPT

## 2023-10-11 PROCEDURE — 2500000004 HC RX 250 GENERAL PHARMACY W/ HCPCS (ALT 636 FOR OP/ED): Performed by: INTERNAL MEDICINE

## 2023-10-11 PROCEDURE — 96411 CHEMO IV PUSH ADDL DRUG: CPT

## 2023-10-11 PROCEDURE — 96413 CHEMO IV INFUSION 1 HR: CPT

## 2023-10-11 PROCEDURE — 96375 TX/PRO/DX INJ NEW DRUG ADDON: CPT

## 2023-10-11 RX ORDER — FAMOTIDINE 10 MG/ML
20 INJECTION INTRAVENOUS ONCE
Status: COMPLETED | OUTPATIENT
Start: 2023-10-11 | End: 2023-10-11

## 2023-10-11 RX ORDER — FAMOTIDINE 10 MG/ML
20 INJECTION INTRAVENOUS ONCE AS NEEDED
Status: DISCONTINUED | OUTPATIENT
Start: 2023-10-11 | End: 2023-10-11 | Stop reason: HOSPADM

## 2023-10-11 RX ORDER — HEPARIN SODIUM,PORCINE/PF 10 UNIT/ML
50 SYRINGE (ML) INTRAVENOUS AS NEEDED
Status: CANCELLED | OUTPATIENT
Start: 2023-10-11

## 2023-10-11 RX ORDER — DIPHENHYDRAMINE HYDROCHLORIDE 50 MG/ML
50 INJECTION INTRAMUSCULAR; INTRAVENOUS AS NEEDED
Status: DISCONTINUED | OUTPATIENT
Start: 2023-10-11 | End: 2023-10-11 | Stop reason: HOSPADM

## 2023-10-11 RX ORDER — ATROPINE SULFATE 0.4 MG/ML
0.4 INJECTION, SOLUTION ENDOTRACHEAL; INTRAMEDULLARY; INTRAMUSCULAR; INTRAVENOUS; SUBCUTANEOUS
Status: COMPLETED | OUTPATIENT
Start: 2023-10-11 | End: 2023-10-11

## 2023-10-11 RX ORDER — FLUOROURACIL 50 MG/ML
400 INJECTION, SOLUTION INTRAVENOUS ONCE
Status: DISCONTINUED | OUTPATIENT
Start: 2023-10-11 | End: 2023-10-11

## 2023-10-11 RX ORDER — EPINEPHRINE 0.3 MG/.3ML
0.3 INJECTION SUBCUTANEOUS EVERY 5 MIN PRN
Status: DISCONTINUED | OUTPATIENT
Start: 2023-10-11 | End: 2023-10-11 | Stop reason: HOSPADM

## 2023-10-11 RX ORDER — HEPARIN SODIUM,PORCINE/PF 10 UNIT/ML
50 SYRINGE (ML) INTRAVENOUS AS NEEDED
Status: DISCONTINUED | OUTPATIENT
Start: 2023-10-11 | End: 2023-10-11 | Stop reason: HOSPADM

## 2023-10-11 RX ORDER — HEPARIN 100 UNIT/ML
500 SYRINGE INTRAVENOUS AS NEEDED
Status: CANCELLED | OUTPATIENT
Start: 2023-10-11

## 2023-10-11 RX ORDER — ALBUTEROL SULFATE 0.83 MG/ML
3 SOLUTION RESPIRATORY (INHALATION) AS NEEDED
Status: DISCONTINUED | OUTPATIENT
Start: 2023-10-11 | End: 2023-10-11 | Stop reason: HOSPADM

## 2023-10-11 RX ORDER — PROCHLORPERAZINE MALEATE 10 MG
10 TABLET ORAL EVERY 6 HOURS PRN
Status: DISCONTINUED | OUTPATIENT
Start: 2023-10-11 | End: 2023-10-11 | Stop reason: HOSPADM

## 2023-10-11 RX ORDER — HEPARIN 100 UNIT/ML
500 SYRINGE INTRAVENOUS AS NEEDED
Status: DISCONTINUED | OUTPATIENT
Start: 2023-10-11 | End: 2023-10-11 | Stop reason: HOSPADM

## 2023-10-11 RX ADMIN — ATROPINE SULFATE 0.4 MG: 0.4 INJECTION, SOLUTION INTRAVENOUS at 15:27

## 2023-10-11 RX ADMIN — FLUOROURACIL 5650 MG: 50 INJECTION, SOLUTION INTRAVENOUS at 15:28

## 2023-10-11 RX ADMIN — FAMOTIDINE 20 MG: 10 INJECTION INTRAVENOUS at 12:28

## 2023-10-11 RX ADMIN — ONDANSETRON 16 MG: 2 INJECTION INTRAMUSCULAR; INTRAVENOUS at 12:52

## 2023-10-11 RX ADMIN — DEXAMETHASONE SODIUM PHOSPHATE 16 MG: 4 INJECTION, SOLUTION INTRA-ARTICULAR; INTRALESIONAL; INTRAMUSCULAR; INTRAVENOUS; SOFT TISSUE at 12:30

## 2023-10-11 RX ADMIN — IRINOTECAN HYDROCHLORIDE 420 MG: 20 INJECTION, SOLUTION INTRAVENOUS at 13:28

## 2023-10-11 RX ADMIN — ATROPINE SULFATE 0.4 MG: 0.4 INJECTION, SOLUTION INTRAVENOUS at 13:24

## 2023-10-11 ASSESSMENT — PAIN SCALES - GENERAL: PAINLEVEL: 0-NO PAIN

## 2023-10-11 NOTE — SIGNIFICANT EVENT
10/11/23 1136   Prechemo Checklist   Has the patient been in the hospital, ED, or urgent care since last date of service No   Chemo/Immuno Consent Signed Yes   Protocol/Indications Verified Yes   Confirmed to previous date/time of medication Yes   Compared to previous dose Yes   All medications are dated accurately Yes   Pregnancy Test Negative Not applicable   Parameters Met Yes   BSA/Weight-Height Verified Yes   Dose Calculations Verified Yes

## 2023-10-12 ENCOUNTER — APPOINTMENT (OUTPATIENT)
Dept: HEMATOLOGY/ONCOLOGY | Facility: CLINIC | Age: 68
End: 2023-10-12
Payer: MEDICARE

## 2023-10-13 ENCOUNTER — INFUSION (OUTPATIENT)
Dept: HEMATOLOGY/ONCOLOGY | Facility: CLINIC | Age: 68
End: 2023-10-13
Payer: MEDICARE

## 2023-10-13 VITALS
TEMPERATURE: 97 F | HEART RATE: 86 BPM | BODY MASS INDEX: 35.25 KG/M2 | OXYGEN SATURATION: 96 % | DIASTOLIC BLOOD PRESSURE: 72 MMHG | SYSTOLIC BLOOD PRESSURE: 116 MMHG | RESPIRATION RATE: 17 BRPM | WEIGHT: 247.36 LBS

## 2023-10-13 DIAGNOSIS — C18.9 METASTATIC COLON CANCER TO LIVER (MULTI): ICD-10-CM

## 2023-10-13 DIAGNOSIS — C18.9 MALIGNANT NEOPLASM OF COLON, UNSPECIFIED PART OF COLON (MULTI): ICD-10-CM

## 2023-10-13 DIAGNOSIS — C78.7 METASTATIC COLON CANCER TO LIVER (MULTI): ICD-10-CM

## 2023-10-13 PROCEDURE — 2500000004 HC RX 250 GENERAL PHARMACY W/ HCPCS (ALT 636 FOR OP/ED): Performed by: INTERNAL MEDICINE

## 2023-10-13 PROCEDURE — 96374 THER/PROPH/DIAG INJ IV PUSH: CPT

## 2023-10-13 PROCEDURE — 96361 HYDRATE IV INFUSION ADD-ON: CPT

## 2023-10-13 PROCEDURE — 96372 THER/PROPH/DIAG INJ SC/IM: CPT

## 2023-10-13 PROCEDURE — 2500000004 HC RX 250 GENERAL PHARMACY W/ HCPCS (ALT 636 FOR OP/ED): Mod: JZ | Performed by: INTERNAL MEDICINE

## 2023-10-13 RX ORDER — PALONOSETRON 0.05 MG/ML
250 INJECTION, SOLUTION INTRAVENOUS ONCE
Status: COMPLETED | OUTPATIENT
Start: 2023-10-13 | End: 2023-10-13

## 2023-10-13 RX ADMIN — SODIUM CHLORIDE 1000 ML: 9 INJECTION, SOLUTION INTRAVENOUS at 14:14

## 2023-10-13 RX ADMIN — PEGFILGRASTIM 6 MG: KIT SUBCUTANEOUS at 14:14

## 2023-10-13 RX ADMIN — PALONOSETRON HYDROCHLORIDE 250 MCG: 0.25 INJECTION INTRAVENOUS at 14:13

## 2023-10-13 ASSESSMENT — COLUMBIA-SUICIDE SEVERITY RATING SCALE - C-SSRS
2. HAVE YOU ACTUALLY HAD ANY THOUGHTS OF KILLING YOURSELF?: NO
6. HAVE YOU EVER DONE ANYTHING, STARTED TO DO ANYTHING, OR PREPARED TO DO ANYTHING TO END YOUR LIFE?: NO
1. IN THE PAST MONTH, HAVE YOU WISHED YOU WERE DEAD OR WISHED YOU COULD GO TO SLEEP AND NOT WAKE UP?: NO

## 2023-10-13 ASSESSMENT — PATIENT HEALTH QUESTIONNAIRE - PHQ9
SUM OF ALL RESPONSES TO PHQ9 QUESTIONS 1 AND 2: 0
2. FEELING DOWN, DEPRESSED OR HOPELESS: NOT AT ALL
1. LITTLE INTEREST OR PLEASURE IN DOING THINGS: NOT AT ALL

## 2023-10-13 ASSESSMENT — PAIN SCALES - GENERAL: PAINLEVEL: 0-NO PAIN

## 2023-10-13 NOTE — PROGRESS NOTES
Pt here for 5FU pump disconnect, IV fluids with aloxi and onpro application. Tolerated well, discharged in stable condition, no further questions or concerns. Verbalizes understanding of home care/removal of onpro device.

## 2023-10-16 ENCOUNTER — TELEPHONE (OUTPATIENT)
Dept: HEMATOLOGY/ONCOLOGY | Facility: CLINIC | Age: 68
End: 2023-10-16
Payer: MEDICARE

## 2023-10-16 NOTE — TELEPHONE ENCOUNTER
Spoke with Rebecca at [facility] radi[facility]- about patient's pending CT scan. Explained that patient can have CT at  on 10.18 as already scheduled. No further needs at this time.

## 2023-10-18 ENCOUNTER — ANCILLARY PROCEDURE (OUTPATIENT)
Dept: RADIOLOGY | Facility: CLINIC | Age: 68
End: 2023-10-18
Payer: MEDICARE

## 2023-10-18 DIAGNOSIS — C78.7 METASTATIC COLON CANCER TO LIVER (MULTI): ICD-10-CM

## 2023-10-18 DIAGNOSIS — C18.9 MALIGNANT NEOPLASM OF COLON, UNSPECIFIED PART OF COLON (MULTI): ICD-10-CM

## 2023-10-18 DIAGNOSIS — C18.9 METASTATIC COLON CANCER TO LIVER (MULTI): ICD-10-CM

## 2023-10-18 PROCEDURE — 2550000001 HC RX 255 CONTRASTS: Performed by: INTERNAL MEDICINE

## 2023-10-18 PROCEDURE — 74177 CT ABD & PELVIS W/CONTRAST: CPT | Mod: ME

## 2023-10-18 PROCEDURE — 74176 CT ABD & PELVIS W/O CONTRAST: CPT | Performed by: RADIOLOGY

## 2023-10-18 PROCEDURE — 71250 CT THORAX DX C-: CPT | Performed by: RADIOLOGY

## 2023-10-18 RX ADMIN — IOHEXOL 75 ML: 350 INJECTION, SOLUTION INTRAVENOUS at 10:09

## 2023-10-19 PROBLEM — D84.821 IMMUNODEFICIENCY DUE TO DRUGS (CODE) (MULTI): Status: ACTIVE | Noted: 2023-10-19

## 2023-10-19 PROBLEM — E66.01 OBESITY, MORBID (MULTI): Status: ACTIVE | Noted: 2023-10-19

## 2023-10-19 ASSESSMENT — ENCOUNTER SYMPTOMS
NEUROLOGICAL NEGATIVE: 1
CONSTITUTIONAL NEGATIVE: 1
EYES NEGATIVE: 1
DECREASED CONCENTRATION: 1
GASTROINTESTINAL NEGATIVE: 1
HEMATOLOGIC/LYMPHATIC NEGATIVE: 1
CARDIOVASCULAR NEGATIVE: 1
CONFUSION: 1
MUSCULOSKELETAL NEGATIVE: 1
RESPIRATORY NEGATIVE: 1
ENDOCRINE NEGATIVE: 1

## 2023-10-23 ENCOUNTER — APPOINTMENT (OUTPATIENT)
Dept: RADIOLOGY | Facility: HOSPITAL | Age: 68
End: 2023-10-23
Payer: MEDICARE

## 2023-10-25 ENCOUNTER — OFFICE VISIT (OUTPATIENT)
Dept: HEMATOLOGY/ONCOLOGY | Facility: CLINIC | Age: 68
End: 2023-10-25
Payer: MEDICARE

## 2023-10-25 ENCOUNTER — INFUSION (OUTPATIENT)
Dept: HEMATOLOGY/ONCOLOGY | Facility: CLINIC | Age: 68
End: 2023-10-25
Payer: MEDICARE

## 2023-10-25 VITALS
WEIGHT: 249.34 LBS | OXYGEN SATURATION: 96 % | SYSTOLIC BLOOD PRESSURE: 120 MMHG | DIASTOLIC BLOOD PRESSURE: 76 MMHG | BODY MASS INDEX: 35.54 KG/M2 | HEART RATE: 80 BPM | TEMPERATURE: 96.8 F | RESPIRATION RATE: 18 BRPM

## 2023-10-25 DIAGNOSIS — C18.9 MALIGNANT NEOPLASM OF COLON, UNSPECIFIED PART OF COLON (MULTI): ICD-10-CM

## 2023-10-25 DIAGNOSIS — C78.7 METASTATIC COLON CANCER TO LIVER (MULTI): ICD-10-CM

## 2023-10-25 DIAGNOSIS — E11.65 TYPE 2 DIABETES MELLITUS WITH HYPERGLYCEMIA, WITH LONG-TERM CURRENT USE OF INSULIN (MULTI): ICD-10-CM

## 2023-10-25 DIAGNOSIS — Z79.4 TYPE 2 DIABETES MELLITUS WITH HYPERGLYCEMIA, WITH LONG-TERM CURRENT USE OF INSULIN (MULTI): ICD-10-CM

## 2023-10-25 DIAGNOSIS — I10 PRIMARY HYPERTENSION: ICD-10-CM

## 2023-10-25 DIAGNOSIS — C18.9 METASTATIC COLON CANCER TO LIVER (MULTI): ICD-10-CM

## 2023-10-25 DIAGNOSIS — C18.9 MALIGNANT NEOPLASM OF COLON, UNSPECIFIED PART OF COLON (MULTI): Primary | ICD-10-CM

## 2023-10-25 DIAGNOSIS — R97.20 ELEVATED PSA: ICD-10-CM

## 2023-10-25 LAB
ALBUMIN SERPL BCP-MCNC: 4 G/DL (ref 3.4–5)
ALP SERPL-CCNC: 144 U/L (ref 33–136)
ALT SERPL W P-5'-P-CCNC: 21 U/L (ref 10–52)
ANION GAP SERPL CALC-SCNC: 12 MMOL/L (ref 10–20)
AST SERPL W P-5'-P-CCNC: 11 U/L (ref 9–39)
BASOPHILS # BLD AUTO: 0.06 X10*3/UL (ref 0–0.1)
BASOPHILS NFR BLD AUTO: 0.4 %
BILIRUB SERPL-MCNC: 0.4 MG/DL (ref 0–1.2)
BUN SERPL-MCNC: 16 MG/DL (ref 6–23)
CALCIUM SERPL-MCNC: 9.3 MG/DL (ref 8.6–10.3)
CEA SERPL-MCNC: 2.7 UG/L
CHLORIDE SERPL-SCNC: 104 MMOL/L (ref 98–107)
CO2 SERPL-SCNC: 27 MMOL/L (ref 21–32)
CREAT SERPL-MCNC: 1.04 MG/DL (ref 0.5–1.3)
EOSINOPHIL # BLD AUTO: 0.28 X10*3/UL (ref 0–0.7)
EOSINOPHIL NFR BLD AUTO: 2 %
ERYTHROCYTE [DISTWIDTH] IN BLOOD BY AUTOMATED COUNT: 16.4 % (ref 11.5–14.5)
GFR SERPL CREATININE-BSD FRML MDRD: 78 ML/MIN/1.73M*2
GLUCOSE SERPL-MCNC: 202 MG/DL (ref 74–99)
HCT VFR BLD AUTO: 40.3 % (ref 41–52)
HGB BLD-MCNC: 13.1 G/DL (ref 13.5–17.5)
IMM GRANULOCYTES # BLD AUTO: 0.31 X10*3/UL (ref 0–0.7)
IMM GRANULOCYTES NFR BLD AUTO: 2.2 % (ref 0–0.9)
LYMPHOCYTES # BLD AUTO: 2.22 X10*3/UL (ref 1.2–4.8)
LYMPHOCYTES NFR BLD AUTO: 16 %
MCH RBC QN AUTO: 30.7 PG (ref 26–34)
MCHC RBC AUTO-ENTMCNC: 32.5 G/DL (ref 32–36)
MCV RBC AUTO: 94 FL (ref 80–100)
MONOCYTES # BLD AUTO: 0.73 X10*3/UL (ref 0.1–1)
MONOCYTES NFR BLD AUTO: 5.3 %
NEUTROPHILS # BLD AUTO: 10.25 X10*3/UL (ref 1.2–7.7)
NEUTROPHILS NFR BLD AUTO: 74.1 %
PLATELET # BLD AUTO: 254 X10*3/UL (ref 150–450)
PMV BLD AUTO: 10.1 FL (ref 7.5–11.5)
POTASSIUM SERPL-SCNC: 4.2 MMOL/L (ref 3.5–5.3)
PROT SERPL-MCNC: 6.4 G/DL (ref 6.4–8.2)
RBC # BLD AUTO: 4.27 X10*6/UL (ref 4.5–5.9)
SODIUM SERPL-SCNC: 139 MMOL/L (ref 136–145)
WBC # BLD AUTO: 13.9 X10*3/UL (ref 4.4–11.3)

## 2023-10-25 PROCEDURE — 82378 CARCINOEMBRYONIC ANTIGEN: CPT | Performed by: INTERNAL MEDICINE

## 2023-10-25 PROCEDURE — 1036F TOBACCO NON-USER: CPT | Performed by: INTERNAL MEDICINE

## 2023-10-25 PROCEDURE — 96376 TX/PRO/DX INJ SAME DRUG ADON: CPT

## 2023-10-25 PROCEDURE — 3052F HG A1C>EQUAL 8.0%<EQUAL 9.0%: CPT | Performed by: INTERNAL MEDICINE

## 2023-10-25 PROCEDURE — 96416 CHEMO PROLONG INFUSE W/PUMP: CPT

## 2023-10-25 PROCEDURE — 96413 CHEMO IV INFUSION 1 HR: CPT

## 2023-10-25 PROCEDURE — 96375 TX/PRO/DX INJ NEW DRUG ADDON: CPT | Mod: INF

## 2023-10-25 PROCEDURE — 1159F MED LIST DOCD IN RCRD: CPT | Performed by: INTERNAL MEDICINE

## 2023-10-25 PROCEDURE — 96411 CHEMO IV PUSH ADDL DRUG: CPT

## 2023-10-25 PROCEDURE — 2500000004 HC RX 250 GENERAL PHARMACY W/ HCPCS (ALT 636 FOR OP/ED): Performed by: INTERNAL MEDICINE

## 2023-10-25 PROCEDURE — 99214 OFFICE O/P EST MOD 30 MIN: CPT | Performed by: INTERNAL MEDICINE

## 2023-10-25 PROCEDURE — 3078F DIAST BP <80 MM HG: CPT | Performed by: INTERNAL MEDICINE

## 2023-10-25 PROCEDURE — 84075 ASSAY ALKALINE PHOSPHATASE: CPT | Performed by: INTERNAL MEDICINE

## 2023-10-25 PROCEDURE — 85025 COMPLETE CBC W/AUTO DIFF WBC: CPT | Performed by: INTERNAL MEDICINE

## 2023-10-25 PROCEDURE — 1126F AMNT PAIN NOTED NONE PRSNT: CPT | Performed by: INTERNAL MEDICINE

## 2023-10-25 PROCEDURE — 4010F ACE/ARB THERAPY RXD/TAKEN: CPT | Performed by: INTERNAL MEDICINE

## 2023-10-25 PROCEDURE — 3074F SYST BP LT 130 MM HG: CPT | Performed by: INTERNAL MEDICINE

## 2023-10-25 RX ORDER — FLUOROURACIL 50 MG/ML
400 INJECTION, SOLUTION INTRAVENOUS ONCE
Status: DISCONTINUED | OUTPATIENT
Start: 2023-10-25 | End: 2023-10-25 | Stop reason: RX

## 2023-10-25 RX ORDER — HEPARIN SODIUM,PORCINE/PF 10 UNIT/ML
50 SYRINGE (ML) INTRAVENOUS AS NEEDED
Status: DISCONTINUED | OUTPATIENT
Start: 2023-10-25 | End: 2023-10-25 | Stop reason: HOSPADM

## 2023-10-25 RX ORDER — ALBUTEROL SULFATE 0.83 MG/ML
3 SOLUTION RESPIRATORY (INHALATION) AS NEEDED
Status: DISCONTINUED | OUTPATIENT
Start: 2023-10-25 | End: 2023-10-25 | Stop reason: HOSPADM

## 2023-10-25 RX ORDER — EPINEPHRINE 0.3 MG/.3ML
0.3 INJECTION SUBCUTANEOUS EVERY 5 MIN PRN
Status: DISCONTINUED | OUTPATIENT
Start: 2023-10-25 | End: 2023-10-25 | Stop reason: HOSPADM

## 2023-10-25 RX ORDER — FLUOROURACIL 50 MG/ML
400 INJECTION, SOLUTION INTRAVENOUS ONCE
Status: CANCELLED | OUTPATIENT
Start: 2023-11-08

## 2023-10-25 RX ORDER — HEPARIN 100 UNIT/ML
500 SYRINGE INTRAVENOUS AS NEEDED
Status: DISCONTINUED | OUTPATIENT
Start: 2023-10-25 | End: 2023-10-25 | Stop reason: HOSPADM

## 2023-10-25 RX ORDER — DIPHENHYDRAMINE HYDROCHLORIDE 50 MG/ML
50 INJECTION INTRAMUSCULAR; INTRAVENOUS AS NEEDED
Status: CANCELLED | OUTPATIENT
Start: 2023-11-08

## 2023-10-25 RX ORDER — HEPARIN 100 UNIT/ML
500 SYRINGE INTRAVENOUS AS NEEDED
Status: CANCELLED | OUTPATIENT
Start: 2023-10-25

## 2023-10-25 RX ORDER — PALONOSETRON 0.05 MG/ML
250 INJECTION, SOLUTION INTRAVENOUS ONCE
Status: CANCELLED
Start: 2023-11-10 | End: 2023-11-10

## 2023-10-25 RX ORDER — PROCHLORPERAZINE MALEATE 10 MG
10 TABLET ORAL EVERY 6 HOURS PRN
Status: CANCELLED | OUTPATIENT
Start: 2023-11-08

## 2023-10-25 RX ORDER — EPINEPHRINE 0.3 MG/.3ML
0.3 INJECTION SUBCUTANEOUS EVERY 5 MIN PRN
Status: CANCELLED | OUTPATIENT
Start: 2023-11-08

## 2023-10-25 RX ORDER — FAMOTIDINE 10 MG/ML
20 INJECTION INTRAVENOUS ONCE AS NEEDED
Status: DISCONTINUED | OUTPATIENT
Start: 2023-10-25 | End: 2023-10-25 | Stop reason: HOSPADM

## 2023-10-25 RX ORDER — DIPHENHYDRAMINE HYDROCHLORIDE 50 MG/ML
50 INJECTION INTRAMUSCULAR; INTRAVENOUS AS NEEDED
Status: DISCONTINUED | OUTPATIENT
Start: 2023-10-25 | End: 2023-10-25 | Stop reason: HOSPADM

## 2023-10-25 RX ORDER — FAMOTIDINE 10 MG/ML
20 INJECTION INTRAVENOUS ONCE
Status: COMPLETED | OUTPATIENT
Start: 2023-10-25 | End: 2023-10-25

## 2023-10-25 RX ORDER — ATROPINE SULFATE 0.4 MG/ML
0.4 INJECTION, SOLUTION ENDOTRACHEAL; INTRAMEDULLARY; INTRAMUSCULAR; INTRAVENOUS; SUBCUTANEOUS
Status: COMPLETED | OUTPATIENT
Start: 2023-10-25 | End: 2023-10-25

## 2023-10-25 RX ORDER — HEPARIN SODIUM,PORCINE/PF 10 UNIT/ML
50 SYRINGE (ML) INTRAVENOUS AS NEEDED
Status: CANCELLED | OUTPATIENT
Start: 2023-10-25

## 2023-10-25 RX ORDER — FAMOTIDINE 10 MG/ML
20 INJECTION INTRAVENOUS ONCE AS NEEDED
Status: CANCELLED | OUTPATIENT
Start: 2023-11-08

## 2023-10-25 RX ORDER — FAMOTIDINE 10 MG/ML
20 INJECTION INTRAVENOUS ONCE
Status: CANCELLED
Start: 2023-11-08 | End: 2023-11-08

## 2023-10-25 RX ORDER — ATROPINE SULFATE 0.4 MG/ML
0.4 INJECTION, SOLUTION ENDOTRACHEAL; INTRAMEDULLARY; INTRAMUSCULAR; INTRAVENOUS; SUBCUTANEOUS
Status: CANCELLED | OUTPATIENT
Start: 2023-11-08

## 2023-10-25 RX ORDER — ALBUTEROL SULFATE 0.83 MG/ML
3 SOLUTION RESPIRATORY (INHALATION) AS NEEDED
Status: CANCELLED | OUTPATIENT
Start: 2023-11-08

## 2023-10-25 RX ADMIN — DEXAMETHASONE SODIUM PHOSPHATE 16 MG: 10 INJECTION, SOLUTION INTRAMUSCULAR; INTRAVENOUS at 12:11

## 2023-10-25 RX ADMIN — FLUOROURACIL 5650 MG: 50 INJECTION, SOLUTION INTRAVENOUS at 14:54

## 2023-10-25 RX ADMIN — ONDANSETRON 16 MG: 2 INJECTION INTRAMUSCULAR; INTRAVENOUS at 12:35

## 2023-10-25 RX ADMIN — ATROPINE SULFATE 0.4 MG: 0.4 INJECTION, SOLUTION INTRAVENOUS at 12:56

## 2023-10-25 RX ADMIN — FAMOTIDINE 20 MG: 10 INJECTION INTRAVENOUS at 12:09

## 2023-10-25 RX ADMIN — ATROPINE SULFATE 0.4 MG: 0.4 INJECTION, SOLUTION INTRAVENOUS at 14:49

## 2023-10-25 RX ADMIN — IRINOTECAN HYDROCHLORIDE 420 MG: 20 INJECTION, SOLUTION INTRAVENOUS at 13:01

## 2023-10-25 ASSESSMENT — PAIN SCALES - GENERAL: PAINLEVEL: 0-NO PAIN

## 2023-10-25 NOTE — PROGRESS NOTES
Patient ID: Toby Hicks is a 68 y.o. male.  Referring Physician: Luis Palacios MD  [id number] [facility] [address] Hernandez [address] [place] [address]  Primary Care Provider: Anand Adkins MD  Visit Type: Follow Up      Subjective    HPI  How was my CT scan?    Review of Systems   Constitutional:  Positive for fatigue.   HENT:  Negative.     Eyes: Negative.    Respiratory: Negative.     Cardiovascular: Negative.    Gastrointestinal: Negative.    Endocrine: Negative.    Musculoskeletal: Negative.    Skin: Negative.    Neurological: Negative.    Hematological: Negative.    Psychiatric/Behavioral: Negative.          Objective   BSA: 2.37 meters squared  /76 (BP Location: Right leg)   Pulse 80   Temp 36 °C (96.8 °F)   Resp 18   Wt 113 kg (249 lb 5.4 oz)   SpO2 96%   BMI 35.54 kg/m²      has a past medical history of Personal history of other diseases of the circulatory system, Personal history of other endocrine, nutritional and metabolic disease, Personal history of other malignant neoplasm of skin, and Type 2 diabetes mellitus without complications (CMS/HCC) (09/12/2020).   has a past surgical history that includes Other surgical history (04/09/2014); Tonsillectomy (04/09/2014); Other surgical history (09/30/2021); Other surgical history (09/30/2021); Other surgical history (09/12/2020); CT guided percutaneous biopsy liver (2/24/2022); and CT guided percutaneous biopsy lung (8/1/2023).  No family history on file.  Oncology History   Colon cancer (CMS/HCC)   8/29/2023 -  Chemotherapy    FOLFIRI (Fluorouracil Continuous Infusion / Leucovorin / Irinotecan), 14 Day Cycles     9/13/2023 Initial Diagnosis    Colon cancer (CMS/HCC)     Metastatic colon cancer to liver (CMS/HCC)   8/29/2023 -  Chemotherapy    FOLFIRI (Fluorouracil Continuous Infusion / Leucovorin / Irinotecan), 14 Day Cycles     9/13/2023 Initial Diagnosis    Metastatic colon cancer to liver (CMS/HCC)         Toby Hicks  reports that he quit  smoking about 35 years ago. His smoking use included cigarettes. He has never used smokeless tobacco.  He  reports no history of alcohol use.  He  reports no history of drug use.    Physical Exam  Vitals reviewed.   HENT:      Head: Normocephalic.      Mouth/Throat:      Mouth: Mucous membranes are moist.   Eyes:      Extraocular Movements: Extraocular movements intact.      Pupils: Pupils are equal, round, and reactive to light.   Cardiovascular:      Rate and Rhythm: Normal rate and regular rhythm.   Pulmonary:      Breath sounds: Normal breath sounds.      Comments: Chest wall portacath  Abdominal:      General: Bowel sounds are normal.      Palpations: Abdomen is soft.   Musculoskeletal:      Cervical back: Normal range of motion and neck supple.   Skin:     General: Skin is warm and dry.   Neurological:      General: No focal deficit present.      Mental Status: He is alert and oriented to person, place, and time.   Psychiatric:         Mood and Affect: Mood normal.         WBC   Date/Time Value Ref Range Status   10/10/2023 02:18 PM 14.1 (H) 4.4 - 11.3 x10*3/uL Final   09/26/2023 10:03 AM 17.4 (H) 4.4 - 11.3 x10E9/L Final   09/12/2023 08:50 AM 9.3 4.4 - 11.3 x10E9/L Final   08/22/2023 12:32 PM 6.8 4.4 - 11.3 x10E9/L Final     nRBC   Date Value Ref Range Status   04/07/2023 0.0 0.0 - 0.0 /100 WBC Final   11/29/2022 0.0 0.0 - 0.0 /100 WBC Final   11/28/2022 0.0 0.0 - 0.0 /100 WBC Final     RBC   Date Value Ref Range Status   10/10/2023 4.22 (L) 4.50 - 5.90 x10*6/uL Final   09/26/2023 4.18 (L) 4.50 - 5.90 x10E12/L Final   09/12/2023 4.60 4.50 - 5.90 x10E12/L Final   08/22/2023 4.54 4.50 - 5.90 x10E12/L Final     Hemoglobin   Date Value Ref Range Status   10/10/2023 12.8 (L) 13.5 - 17.5 g/dL Final   09/26/2023 12.7 (L) 13.5 - 17.5 g/dL Final   09/12/2023 13.9 13.5 - 17.5 g/dL Final   08/22/2023 13.7 13.5 - 17.5 g/dL Final     Hematocrit   Date Value Ref Range Status   10/10/2023 39.4 (L) 41.0 - 52.0 % Final    09/26/2023 39.0 (L) 41.0 - 52.0 % Final   09/12/2023 41.2 41.0 - 52.0 % Final   08/22/2023 40.0 (L) 41.0 - 52.0 % Final     MCV   Date/Time Value Ref Range Status   10/10/2023 02:18 PM 93 80 - 100 fL Final   09/26/2023 10:03 AM 93 80 - 100 fL Final   09/12/2023 08:50 AM 90 80 - 100 fL Final   08/22/2023 12:32 PM 88 80 - 100 fL Final     MCH   Date/Time Value Ref Range Status   10/10/2023 02:18 PM 30.3 26.0 - 34.0 pg Final     MCHC   Date/Time Value Ref Range Status   10/10/2023 02:18 PM 32.5 32.0 - 36.0 g/dL Final   09/26/2023 10:03 AM 32.6 32.0 - 36.0 g/dL Final   09/12/2023 08:50 AM 33.7 32.0 - 36.0 g/dL Final   08/22/2023 12:32 PM 34.3 32.0 - 36.0 g/dL Final     RDW   Date/Time Value Ref Range Status   10/10/2023 02:18 PM 15.7 (H) 11.5 - 14.5 % Final   09/26/2023 10:03 AM 15.6 (H) 11.5 - 14.5 % Final   09/12/2023 08:50 AM 14.4 11.5 - 14.5 % Final   08/22/2023 12:32 PM 13.3 11.5 - 14.5 % Final     Platelets   Date/Time Value Ref Range Status   10/10/2023 02:18  150 - 450 x10*3/uL Final   09/26/2023 10:03  150 - 450 x10E9/L Final   09/12/2023 08:50  150 - 450 x10E9/L Final   08/22/2023 12:32  150 - 450 x10E9/L Final     MPV   Date/Time Value Ref Range Status   10/10/2023 02:18 PM 10.2 7.5 - 11.5 fL Final     Neutrophils %   Date/Time Value Ref Range Status   10/10/2023 02:18 PM 74.4 40.0 - 80.0 % Final   09/26/2023 10:03 AM 78.5 40.0 - 80.0 % Final   09/12/2023 08:50 AM 70.5 40.0 - 80.0 % Final   08/22/2023 12:32 PM 64.9 40.0 - 80.0 % Final     Immature Granulocytes %, Automated   Date/Time Value Ref Range Status   10/10/2023 02:18 PM 3.9 (H) 0.0 - 0.9 % Final     Comment:     Immature Granulocyte Count (IG) includes promyelocytes, myelocytes and metamyelocytes but does not include bands. Percent differential counts (%) should be interpreted in the context of the absolute cell counts (cells/UL).   09/26/2023 10:03 AM 3.3 (H) 0.0 - 0.9 % Final     Comment:      Immature Granulocyte Count  (IG) includes promyelocytes,    myelocytes and metamyelocytes but does not include bands.   Percent differential counts (%) should be interpreted in the   context of the absolute cell counts (cells/L).     09/12/2023 08:50 AM 1.2 (H) 0.0 - 0.9 % Final     Comment:      Immature Granulocyte Count (IG) includes promyelocytes,    myelocytes and metamyelocytes but does not include bands.   Percent differential counts (%) should be interpreted in the   context of the absolute cell counts (cells/L).     08/22/2023 12:32 PM 0.1 0.0 - 0.9 % Final     Comment:      Immature Granulocyte Count (IG) includes promyelocytes,    myelocytes and metamyelocytes but does not include bands.   Percent differential counts (%) should be interpreted in the   context of the absolute cell counts (cells/L).       Lymphocytes %   Date/Time Value Ref Range Status   10/10/2023 02:18 PM 14.3 13.0 - 44.0 % Final   09/26/2023 10:03 AM 12.8 13.0 - 44.0 % Final   09/12/2023 08:50 AM 20.7 13.0 - 44.0 % Final   08/22/2023 12:32 PM 25.8 13.0 - 44.0 % Final   10/03/2022 11:43 AM 43.0 13.0 - 44.0 % Final     Monocytes %   Date/Time Value Ref Range Status   10/10/2023 02:18 PM 5.7 2.0 - 10.0 % Final   09/26/2023 10:03 AM 4.4 2.0 - 10.0 % Final   09/12/2023 08:50 AM 5.5 2.0 - 10.0 % Final   08/22/2023 12:32 PM 6.6 2.0 - 10.0 % Final   10/03/2022 11:43 AM 18.0 2.0 - 10.0 % Final     Eosinophils %   Date/Time Value Ref Range Status   10/10/2023 02:18 PM 1.3 0.0 - 6.0 % Final   09/26/2023 10:03 AM 0.7 0.0 - 6.0 % Final   09/12/2023 08:50 AM 1.6 0.0 - 6.0 % Final   08/22/2023 12:32 PM 2.5 0.0 - 6.0 % Final   10/03/2022 11:43 AM 4.0 0.0 - 6.0 % Final     Basophils %   Date/Time Value Ref Range Status   10/10/2023 02:18 PM 0.4 0.0 - 2.0 % Final   09/26/2023 10:03 AM 0.3 0.0 - 2.0 % Final   09/12/2023 08:50 AM 0.5 0.0 - 2.0 % Final   08/22/2023 12:32 PM 0.1 0.0 - 2.0 % Final   10/03/2022 11:43 AM 0.0 0.0 - 2.0 % Final     Neutrophils Absolute   Date/Time Value Ref  "Range Status   10/10/2023 02:18 PM 10.48 (H) 1.20 - 7.70 x10*3/uL Final     Comment:     Percent differential counts (%) should be interpreted in the context of the absolute cell counts (cells/uL).   09/26/2023 10:03 AM 13.65 (H) 1.20 - 7.70 x10E9/L Final   09/12/2023 08:50 AM 6.58 1.20 - 7.70 x10E9/L Final   08/22/2023 12:32 PM 4.38 1.20 - 7.70 x10E9/L Final     Immature Granulocytes Absolute, Automated   Date/Time Value Ref Range Status   10/10/2023 02:18 PM 0.55 0.00 - 0.70 x10*3/uL Final     Lymphocytes Absolute   Date/Time Value Ref Range Status   10/10/2023 02:18 PM 2.01 1.20 - 4.80 x10*3/uL Final   09/26/2023 10:03 AM 2.23 1.20 - 4.80 x10E9/L Final   09/12/2023 08:50 AM 1.93 1.20 - 4.80 x10E9/L Final   08/22/2023 12:32 PM 1.75 1.20 - 4.80 x10E9/L Final     Monocytes Absolute   Date/Time Value Ref Range Status   10/10/2023 02:18 PM 0.80 0.10 - 1.00 x10*3/uL Final   09/26/2023 10:03 AM 0.77 0.10 - 1.00 x10E9/L Final   09/12/2023 08:50 AM 0.51 0.10 - 1.00 x10E9/L Final   08/22/2023 12:32 PM 0.45 0.10 - 1.00 x10E9/L Final     Eosinophils Absolute   Date/Time Value Ref Range Status   10/10/2023 02:18 PM 0.18 0.00 - 0.70 x10*3/uL Final   09/26/2023 10:03 AM 0.12 0.00 - 0.70 x10E9/L Final   09/12/2023 08:50 AM 0.15 0.00 - 0.70 x10E9/L Final   08/22/2023 12:32 PM 0.17 0.00 - 0.70 x10E9/L Final   10/03/2022 11:43 AM 0.10 0.00 - 0.70 x10E9/L Final     Basophils Absolute   Date/Time Value Ref Range Status   10/10/2023 02:18 PM 0.05 0.00 - 0.10 x10*3/uL Final   09/26/2023 10:03 AM 0.05 0.00 - 0.10 x10E9/L Final   09/12/2023 08:50 AM 0.05 0.00 - 0.10 x10E9/L Final   08/22/2023 12:32 PM 0.01 0.00 - 0.10 x10E9/L Final   10/03/2022 11:43 AM 0.00 0.00 - 0.10 x10E9/L Final       No components found for: \"PT\"  aPTT   Date/Time Value Ref Range Status   06/16/2022 01:53 PM 30 26 - 39 sec Final     Comment:       THE APTT IS NO LONGER USED FOR MONITORING     UNFRACTIONATED HEPARIN THERAPY.    FOR MONITORING HEPARIN THERAPY,     " USE THE HEPARIN ASSAY.     04/16/2022 02:41 PM 23 (L) 26 - 39 sec Final     Comment:       THE APTT IS NO LONGER USED FOR MONITORING     UNFRACTIONATED HEPARIN THERAPY.    FOR MONITORING HEPARIN THERAPY,     USE THE HEPARIN ASSAY.         Assessment/Plan    1) metastatic colon cancer  -had liver metastases at outset of diagnosis  -s/p FOLFOX x 12  -then relapsed in lungs  -now on FOLFIRI, here for cycle #5  -port was accessed today to check CBC, COMP, CEA  -results reviewed--wbc 13.9, hgb 13.1, plt 254,000, glucose 202, creatinine 1.04, CEA 2.7  -reviewed new CT scan done on 10/18--his LLL nodule is stable; no new nodules;  no new liver lesions; ablation zone in liver is stable  -advised him to proceed with 4 more cycles of FOLFIRI to continue to try to eradicate micrometastases; at end of cycle 8 if LLL nodule is still present and if otherwise there is no evidence of progression, he wished to be considered for metastasectomy vs ablation  -benefits, risks, potential morbidity related to FOLFIRI were reviewed with Ed and he provided informed consent to proceed  --on day 1 he will receive ondansetron IV + decadron IV + pepcid IV + atropine IV followed by irinotecan 180 mg/m2 IV + leucovorin 400 mg/m2 IV + 5FU 400 mg/m2 IV push then protracted 5FU 2400 mg/m2 CIV over 46 hours  -on day 3 he will receive aloxi IV + NS 1000 ml IV + onpro neulasta  -he will return in 2 weeks for FOLFIRI #6     2) elevated PSA  -last PSA was 6.98 (1/16/2023)     3) diabetes  -on metformin  -on glimepiride  -on insulin lantus     4) hypertension  -on losartan     Problem List Items Addressed This Visit             ICD-10-CM    Colon cancer (CMS/HCC) - Primary C18.9    Relevant Orders    Infusion Appointment Request [facility] INFUSION (Completed)    CBC and Auto Differential (Completed)    Comprehensive Metabolic Panel (Completed)    Carcinoembryonic Antigen (Completed)    Clinic Appointment Request Chemo Follow Up; [facility]; [facility]    Infusion Appointment Request [facility]    CBC and Auto Differential    Comprehensive Metabolic Panel    Carcinoembryonic Antigen    Infusion Appointment Request [facility]    CBC and Auto Differential    Comprehensive Metabolic Panel    Carcinoembryonic Antigen    Metastatic colon cancer to liver (CMS/HCC) C18.9, C78.7    Relevant Orders    Infusion Appointment Request [facility] INF[facility] (Completed)    CBC and Auto Differential (Completed)    Comprehensive Metabolic Panel (Completed)    Carcinoembryonic Antigen (Completed)    Clinic Appointment Request Chemo Follow Up; [facility]; [facility]    Infusion Appointment Request [facility] INF[facility]    CBC and Auto Differential    Comprehensive Metabolic Panel    Carcinoembryonic Antigen    Infusion Appointment Request [facility]    CBC and Auto Differential    Comprehensive Metabolic Panel    Carcinoembryonic Antigen            Luis Palacios MD

## 2023-10-27 ENCOUNTER — INFUSION (OUTPATIENT)
Dept: HEMATOLOGY/ONCOLOGY | Facility: CLINIC | Age: 68
End: 2023-10-27
Payer: MEDICARE

## 2023-10-27 VITALS
RESPIRATION RATE: 16 BRPM | HEART RATE: 85 BPM | OXYGEN SATURATION: 96 % | WEIGHT: 252.65 LBS | SYSTOLIC BLOOD PRESSURE: 124 MMHG | DIASTOLIC BLOOD PRESSURE: 76 MMHG | BODY MASS INDEX: 36.01 KG/M2 | TEMPERATURE: 97.5 F

## 2023-10-27 DIAGNOSIS — C18.9 METASTATIC COLON CANCER TO LIVER (MULTI): ICD-10-CM

## 2023-10-27 DIAGNOSIS — C78.7 METASTATIC COLON CANCER TO LIVER (MULTI): ICD-10-CM

## 2023-10-27 DIAGNOSIS — C18.9 MALIGNANT NEOPLASM OF COLON, UNSPECIFIED PART OF COLON (MULTI): ICD-10-CM

## 2023-10-27 PROCEDURE — 96374 THER/PROPH/DIAG INJ IV PUSH: CPT | Mod: INF

## 2023-10-27 PROCEDURE — 96361 HYDRATE IV INFUSION ADD-ON: CPT | Mod: INF

## 2023-10-27 PROCEDURE — 2500000004 HC RX 250 GENERAL PHARMACY W/ HCPCS (ALT 636 FOR OP/ED): Performed by: INTERNAL MEDICINE

## 2023-10-27 PROCEDURE — 96377 APPLICATON ON-BODY INJECTOR: CPT | Mod: 59

## 2023-10-27 PROCEDURE — 2500000004 HC RX 250 GENERAL PHARMACY W/ HCPCS (ALT 636 FOR OP/ED): Mod: JZ | Performed by: INTERNAL MEDICINE

## 2023-10-27 RX ORDER — PALONOSETRON 0.05 MG/ML
250 INJECTION, SOLUTION INTRAVENOUS ONCE
Status: COMPLETED | OUTPATIENT
Start: 2023-10-27 | End: 2023-10-27

## 2023-10-27 RX ADMIN — PEGFILGRASTIM 6 MG: KIT SUBCUTANEOUS at 13:19

## 2023-10-27 RX ADMIN — SODIUM CHLORIDE 1000 ML: 9 INJECTION, SOLUTION INTRAVENOUS at 13:16

## 2023-10-27 RX ADMIN — PALONOSETRON 250 MCG: 0.05 INJECTION, SOLUTION INTRAVENOUS at 13:17

## 2023-10-27 ASSESSMENT — PAIN SCALES - GENERAL: PAINLEVEL: 0-NO PAIN

## 2023-10-30 ASSESSMENT — ENCOUNTER SYMPTOMS
CARDIOVASCULAR NEGATIVE: 1
GASTROINTESTINAL NEGATIVE: 1
MUSCULOSKELETAL NEGATIVE: 1
NEUROLOGICAL NEGATIVE: 1
HEMATOLOGIC/LYMPHATIC NEGATIVE: 1
RESPIRATORY NEGATIVE: 1
EYES NEGATIVE: 1
ENDOCRINE NEGATIVE: 1
PSYCHIATRIC NEGATIVE: 1
FATIGUE: 1

## 2023-11-07 ASSESSMENT — ENCOUNTER SYMPTOMS
ENDOCRINE NEGATIVE: 1
PSYCHIATRIC NEGATIVE: 1
EYES NEGATIVE: 1
RESPIRATORY NEGATIVE: 1
HEMATOLOGIC/LYMPHATIC NEGATIVE: 1
FATIGUE: 1
NEUROLOGICAL NEGATIVE: 1
MUSCULOSKELETAL NEGATIVE: 1
CARDIOVASCULAR NEGATIVE: 1

## 2023-11-07 NOTE — PROGRESS NOTES
Patient ID: Toby Hicks is a 68 y.o. male.  Referring Physician: Luis Palacios MD  [id number] [facility] [address] Hernandez [address] [place] [address]  Primary Care Provider: Anand Adkins MD  Visit Type: Follow Up      Subjective    HPI    Mr. Hicks is a 67yo gentleman with colon cancer who is here for medical oncology consultation.  Dr. Anand Adkins is his PCP.  Dr. Esteban Preciado is the referring provider.    On 9/27/2021 he was seen by his PCP for Medicare annual wellness visit.  He was advised to have a colonoscopy, but Toby declined it, stating he wanted to do Cologuard first.  Because of elevated PSA (6), and BPH symptoms, he was referred to urology.  He reported to the urology NP that he had been having right groin discomfort, right sided penile pain as well as bilateral lower quadrant abdominal pain off and on for the last 6 months.  In the office, he was uncomfortable appearing and could no sit still, so he was sent for an abdominal CT with IV contrast that was negative.    Meanwhile, his Cologuard was positive so he then had colonoscopy done on 1/11/2022 by Dr. Moreno.  There was a mass noted in the transverse colon--bx was positive for adenocarcinoma  A full body CT scan was done on 1/20/2022 showing 10 mm hypodensity in caudate lobe of liver  MRI of the liver was done on 1/28 showing solitary 1.3 cm lesion within segment 1 of the liver demonstrating      minimal arterial rim enhancement, consistent with hepatic metastasis    He was referred to Dr. Felipe on 1/31 who advised FNA of liver lesion.  CEA done on 2/1 was 1.3    FNA of liver lesion was done on 2/24 with path showing metastatic adenocarcinoma  He was then referred to Dr. Preciado on 3/3 who advised neoadjuvant chemotherapy followup by restaging studies including MRI of liver followed by laparoscopic colon surgery (by colorectal surgery) with simultaneous microwave ablation of liver lesion by Dr. Preciado.    4/11/2022 here for interval  followup; to start cycle #2 FOLFOX    4/25/2022 here for interval followup; to start cycle #3 FOLFOX    5/9/2022 here for interval followup; to start cycle #4 FOLFOX    8/1/2022 here for interval followup; has been advised to resume chemo    8/22/2022 here for interval followup; for next cycle FOLFOX #6    9/19/2022 here for interval followup; for FOLFOX #8    10/24/2022 here for interval followup; for FOLFOX #10    12/6/2022 here for interval followup; due for final FOLFOX today    1/10/2023 here for interval followup; to review new CT Scan    4/18/2023 here for interval followup; to review new CT scan    7/18/2023 here for interval followup; to review new CT scan    8/7/2023 here for interval followup; to review path    8/22/2023 here for interval followup; to finalize treatment plan    9/12/2023 here for interval followup; for FOLFIRI #2    9/26/2023 here for interval follow up; for dose #3 of FOLFIRI    11/7/2023: presents for interval follow up and dose #6 of FOLFIRI; dose #5 given on 10/25/23. He complains of nausea that he says resolves on its own so he does not take anti-neuase medication. Diarrhea 1-3 episodes per day, sometimes normal Bms, does not take imodium, and fatigue despite sleeping with his CPAP. He is wondering if his symptoms could be related to the neulasta and if it can be held.     Past Medical History        History of Diabetes mellitus type 2, uncomplicated (250.00) (E11.9)        Resolved Date: 30 Sep 2021        History of hyperlipidemia (V12.29) (Z86.39)        History of hypertension (V12.59) (Z86.79)        History of malignant neoplasm of skin (V10.83) (Z85.828)             Surgical History        History of Cardiac catheterization        History of Rhinologic surgery        History of Skin biopsy        History of Sleep Study        History of Tonsillectomy     Review of Systems   Constitutional:  Positive for fatigue.   HENT:  Negative.     Eyes: Negative.    Respiratory: Negative.      Cardiovascular: Negative.    Gastrointestinal:  Positive for diarrhea and nausea.   Endocrine: Negative.    Musculoskeletal: Negative.    Skin: Negative.    Neurological: Negative.    Hematological: Negative.    Psychiatric/Behavioral: Negative.          Objective     Toby Hicks  reports that he quit smoking about 35 years ago. His smoking use included cigarettes. He has never used smokeless tobacco.  He  reports no history of alcohol use.  He  reports no history of drug use.    Physical Exam  Vitals reviewed.   HENT:      Head: Normocephalic.      Mouth/Throat:      Mouth: Mucous membranes are moist.   Eyes:      Extraocular Movements: Extraocular movements intact.      Pupils: Pupils are equal, round, and reactive to light.   Cardiovascular:      Rate and Rhythm: Normal rate and regular rhythm.   Pulmonary:      Breath sounds: Normal breath sounds.      Comments: Chest wall portacath  Abdominal:      General: Bowel sounds are normal.      Palpations: Abdomen is soft.   Musculoskeletal:      Cervical back: Normal range of motion and neck supple.   Skin:     General: Skin is warm and dry.   Neurological:      General: No focal deficit present.      Mental Status: He is alert and oriented to person, place, and time.   Psychiatric:         Mood and Affect: Mood normal.           Assessment/Plan    Mr. Short is a 69yo gentleman with colon cancer who is here for medical oncology consultation    1) metastatic colon cancer  -had liver metastases at outset of diagnosis  -s/p FOLFOX x 12  -then relapsed in lungs  -now on FOLFIRI, here for cycle #5  -port was accessed today to check CBC, COMP, CEA  -results reviewed--wbc 13.9, hgb 13.1, plt 254,000, glucose 202, creatinine 1.04, CEA 2.7  -reviewed new CT scan done on 10/18--his LLL nodule is stable; no new nodules;  no new liver lesions; ablation zone in liver is stable  -advised him to proceed with 4 more cycles of FOLFIRI to continue to try to eradicate  micrometastases; at end of cycle 8 if LLL nodule is still present and if otherwise there is no evidence of progression, he wished to be considered for metastasectomy vs ablation  -benefits, risks, potential morbidity related to FOLFIRI were reviewed with Ed and he provided informed consent to proceed  --on day 1 he will receive ondansetron IV + decadron IV + pepcid IV + atropine IV followed by irinotecan 180 mg/m2 IV + leucovorin 400 mg/m2 IV + 5FU 400 mg/m2 IV push then protracted 5FU 2400 mg/m2 CIV over 46 hours  -on day 3 he will receive aloxi IV + NS 1000 ml IV + onpro neulasta  -he will return in 2 weeks for FOLFIRI #6    11/8/23:   - Presents for cycle #6 of FOLFIRI  - Labs reviewed: WBC 14.6, hgb 12.7, plt 270,000  - He complains of nausea but does not want to take the anti-nausea meds, says nausea resolves on its own and his appetite varies but weight is stable today  - Encouraged low sugar diet with increased hydration  - Declines to come in on week off for supportive care visit  - Diarrhea 1-3x/day large size, does not take imodium  - Fatigue; sleeps often during the day after sleeping well at night with CPAP  - Handout on chemotherapy induced fatigue provided & reviewed, encouraged light activity and he verbalized understanding. He does his own grocery shopping, chores, ect.   - Discussed his side effects are likely chemotherapy induced and not related to the neulasta and discussed risks vs benefits of holding neulasta & my recommendation to continue getting neulasta due to risk of chemo delays and infection/sepsis/death  - He would like to continue with neulasta today and prefers to not change dosage and see how he does with next two weeks  - He will have next dose of FOLFIRI today with no changes  - RTC 2 weeks to see Dr. Palacios and for dose #7 of FOLFIRI      2) elevated PSA  -last PSA was 6.98 (1/16/2023)     3) diabetes  -on metformin  -on glimepiride  -on insulin lantus     4) hypertension  -on  losartan     Diagnoses and all orders for this visit:  Malignant neoplasm of colon, unspecified part of colon (CMS/HCC)  -     Clinic Appointment Request Chemo Follow Up; [facility]; [facility]  -     Clinic Appointment Request [facility]; Future  -     Infusion Appointment Request [facility] INF[facility]ION; Future  -     Infusion Appointment Request [facility]; Future  Metastatic colon cancer to liver (CMS/HCC)  -     Clinic Appointment Request Chemo Follow Up; [facility]; [facility]  -     Clinic Appointment Request [facility]; Future  -     Infusion Appointment Request [facility] INF[facility]ION; Future  -     Infusion Appointment Request [facility]; Future

## 2023-11-08 ENCOUNTER — OFFICE VISIT (OUTPATIENT)
Dept: HEMATOLOGY/ONCOLOGY | Facility: CLINIC | Age: 68
End: 2023-11-08
Payer: MEDICARE

## 2023-11-08 ENCOUNTER — INFUSION (OUTPATIENT)
Dept: HEMATOLOGY/ONCOLOGY | Facility: CLINIC | Age: 68
End: 2023-11-08
Payer: MEDICARE

## 2023-11-08 VITALS
WEIGHT: 249.12 LBS | SYSTOLIC BLOOD PRESSURE: 138 MMHG | BODY MASS INDEX: 35.51 KG/M2 | HEART RATE: 89 BPM | OXYGEN SATURATION: 97 % | RESPIRATION RATE: 20 BRPM | DIASTOLIC BLOOD PRESSURE: 83 MMHG | TEMPERATURE: 96.8 F

## 2023-11-08 DIAGNOSIS — C18.9 MALIGNANT NEOPLASM OF COLON, UNSPECIFIED PART OF COLON (MULTI): Primary | ICD-10-CM

## 2023-11-08 DIAGNOSIS — C18.9 MALIGNANT NEOPLASM OF COLON, UNSPECIFIED PART OF COLON (MULTI): ICD-10-CM

## 2023-11-08 DIAGNOSIS — C78.7 METASTATIC COLON CANCER TO LIVER (MULTI): ICD-10-CM

## 2023-11-08 DIAGNOSIS — C18.9 METASTATIC COLON CANCER TO LIVER (MULTI): ICD-10-CM

## 2023-11-08 LAB
ALBUMIN SERPL BCP-MCNC: 4.2 G/DL (ref 3.4–5)
ALP SERPL-CCNC: 142 U/L (ref 33–136)
ALT SERPL W P-5'-P-CCNC: 22 U/L (ref 10–52)
ANION GAP SERPL CALC-SCNC: 12 MMOL/L (ref 10–20)
AST SERPL W P-5'-P-CCNC: 12 U/L (ref 9–39)
BASOPHILS # BLD AUTO: 0.03 X10*3/UL (ref 0–0.1)
BASOPHILS NFR BLD AUTO: 0.2 %
BILIRUB SERPL-MCNC: 0.4 MG/DL (ref 0–1.2)
BUN SERPL-MCNC: 14 MG/DL (ref 6–23)
CALCIUM SERPL-MCNC: 9.7 MG/DL (ref 8.6–10.3)
CEA SERPL-MCNC: 2.8 UG/L
CHLORIDE SERPL-SCNC: 106 MMOL/L (ref 98–107)
CO2 SERPL-SCNC: 25 MMOL/L (ref 21–32)
CREAT SERPL-MCNC: 0.97 MG/DL (ref 0.5–1.3)
EOSINOPHIL # BLD AUTO: 0.25 X10*3/UL (ref 0–0.7)
EOSINOPHIL NFR BLD AUTO: 1.7 %
ERYTHROCYTE [DISTWIDTH] IN BLOOD BY AUTOMATED COUNT: 16.2 % (ref 11.5–14.5)
GFR SERPL CREATININE-BSD FRML MDRD: 85 ML/MIN/1.73M*2
GLUCOSE SERPL-MCNC: 194 MG/DL (ref 74–99)
HCT VFR BLD AUTO: 38.7 % (ref 41–52)
HGB BLD-MCNC: 12.7 G/DL (ref 13.5–17.5)
IMM GRANULOCYTES # BLD AUTO: 0.6 X10*3/UL (ref 0–0.7)
IMM GRANULOCYTES NFR BLD AUTO: 4.1 % (ref 0–0.9)
LYMPHOCYTES # BLD AUTO: 2.03 X10*3/UL (ref 1.2–4.8)
LYMPHOCYTES NFR BLD AUTO: 13.9 %
MCH RBC QN AUTO: 31.1 PG (ref 26–34)
MCHC RBC AUTO-ENTMCNC: 32.8 G/DL (ref 32–36)
MCV RBC AUTO: 95 FL (ref 80–100)
MONOCYTES # BLD AUTO: 0.84 X10*3/UL (ref 0.1–1)
MONOCYTES NFR BLD AUTO: 5.8 %
NEUTROPHILS # BLD AUTO: 10.83 X10*3/UL (ref 1.2–7.7)
NEUTROPHILS NFR BLD AUTO: 74.3 %
PLATELET # BLD AUTO: 270 X10*3/UL (ref 150–450)
POTASSIUM SERPL-SCNC: 4 MMOL/L (ref 3.5–5.3)
PROT SERPL-MCNC: 6.6 G/DL (ref 6.4–8.2)
RBC # BLD AUTO: 4.08 X10*6/UL (ref 4.5–5.9)
SODIUM SERPL-SCNC: 139 MMOL/L (ref 136–145)
WBC # BLD AUTO: 14.6 X10*3/UL (ref 4.4–11.3)

## 2023-11-08 PROCEDURE — 85025 COMPLETE CBC W/AUTO DIFF WBC: CPT

## 2023-11-08 PROCEDURE — 3074F SYST BP LT 130 MM HG: CPT

## 2023-11-08 PROCEDURE — 3052F HG A1C>EQUAL 8.0%<EQUAL 9.0%: CPT

## 2023-11-08 PROCEDURE — 96415 CHEMO IV INFUSION ADDL HR: CPT

## 2023-11-08 PROCEDURE — 99214 OFFICE O/P EST MOD 30 MIN: CPT

## 2023-11-08 PROCEDURE — 96375 TX/PRO/DX INJ NEW DRUG ADDON: CPT | Mod: INF

## 2023-11-08 PROCEDURE — 4010F ACE/ARB THERAPY RXD/TAKEN: CPT

## 2023-11-08 PROCEDURE — 2500000004 HC RX 250 GENERAL PHARMACY W/ HCPCS (ALT 636 FOR OP/ED): Performed by: INTERNAL MEDICINE

## 2023-11-08 PROCEDURE — 96376 TX/PRO/DX INJ SAME DRUG ADON: CPT

## 2023-11-08 PROCEDURE — 96413 CHEMO IV INFUSION 1 HR: CPT

## 2023-11-08 PROCEDURE — 82378 CARCINOEMBRYONIC ANTIGEN: CPT

## 2023-11-08 PROCEDURE — 99214 OFFICE O/P EST MOD 30 MIN: CPT | Mod: 25

## 2023-11-08 PROCEDURE — 1126F AMNT PAIN NOTED NONE PRSNT: CPT

## 2023-11-08 PROCEDURE — 80053 COMPREHEN METABOLIC PANEL: CPT

## 2023-11-08 PROCEDURE — 3078F DIAST BP <80 MM HG: CPT

## 2023-11-08 PROCEDURE — 1036F TOBACCO NON-USER: CPT

## 2023-11-08 PROCEDURE — 1159F MED LIST DOCD IN RCRD: CPT

## 2023-11-08 PROCEDURE — 96416 CHEMO PROLONG INFUSE W/PUMP: CPT

## 2023-11-08 RX ORDER — ALBUTEROL SULFATE 0.83 MG/ML
3 SOLUTION RESPIRATORY (INHALATION) AS NEEDED
Status: DISCONTINUED | OUTPATIENT
Start: 2023-11-08 | End: 2023-11-08 | Stop reason: HOSPADM

## 2023-11-08 RX ORDER — DIPHENHYDRAMINE HYDROCHLORIDE 50 MG/ML
50 INJECTION INTRAMUSCULAR; INTRAVENOUS AS NEEDED
Status: DISCONTINUED | OUTPATIENT
Start: 2023-11-08 | End: 2023-11-08 | Stop reason: HOSPADM

## 2023-11-08 RX ORDER — FAMOTIDINE 10 MG/ML
20 INJECTION INTRAVENOUS ONCE AS NEEDED
Status: DISCONTINUED | OUTPATIENT
Start: 2023-11-08 | End: 2023-11-08 | Stop reason: HOSPADM

## 2023-11-08 RX ORDER — PROCHLORPERAZINE MALEATE 10 MG
10 TABLET ORAL EVERY 6 HOURS PRN
Status: DISCONTINUED | OUTPATIENT
Start: 2023-11-08 | End: 2023-11-08 | Stop reason: HOSPADM

## 2023-11-08 RX ORDER — ATROPINE SULFATE 0.4 MG/ML
0.4 INJECTION, SOLUTION ENDOTRACHEAL; INTRAMEDULLARY; INTRAMUSCULAR; INTRAVENOUS; SUBCUTANEOUS
Status: COMPLETED | OUTPATIENT
Start: 2023-11-08 | End: 2023-11-08

## 2023-11-08 RX ORDER — HEPARIN SODIUM,PORCINE/PF 10 UNIT/ML
50 SYRINGE (ML) INTRAVENOUS AS NEEDED
Status: CANCELLED | OUTPATIENT
Start: 2023-11-08

## 2023-11-08 RX ORDER — HEPARIN 100 UNIT/ML
500 SYRINGE INTRAVENOUS AS NEEDED
Status: CANCELLED | OUTPATIENT
Start: 2023-11-08

## 2023-11-08 RX ORDER — EPINEPHRINE 0.3 MG/.3ML
0.3 INJECTION SUBCUTANEOUS EVERY 5 MIN PRN
Status: DISCONTINUED | OUTPATIENT
Start: 2023-11-08 | End: 2023-11-08 | Stop reason: HOSPADM

## 2023-11-08 RX ORDER — FLUOROURACIL 50 MG/ML
400 INJECTION, SOLUTION INTRAVENOUS ONCE
Status: DISCONTINUED | OUTPATIENT
Start: 2023-11-08 | End: 2023-11-08

## 2023-11-08 RX ORDER — FAMOTIDINE 10 MG/ML
20 INJECTION INTRAVENOUS ONCE
Status: COMPLETED | OUTPATIENT
Start: 2023-11-08 | End: 2023-11-08

## 2023-11-08 RX ADMIN — ONDANSETRON 16 MG: 2 INJECTION INTRAMUSCULAR; INTRAVENOUS at 10:44

## 2023-11-08 RX ADMIN — FLUOROURACIL 5650 MG: 50 INJECTION, SOLUTION INTRAVENOUS at 12:59

## 2023-11-08 RX ADMIN — FAMOTIDINE 20 MG: 10 INJECTION INTRAVENOUS at 11:09

## 2023-11-08 RX ADMIN — ATROPINE SULFATE 0.4 MG: 0.4 INJECTION, SOLUTION INTRAVENOUS at 12:59

## 2023-11-08 RX ADMIN — ATROPINE SULFATE 0.4 MG: 0.4 INJECTION, SOLUTION INTRAVENOUS at 11:08

## 2023-11-08 RX ADMIN — DEXAMETHASONE SODIUM PHOSPHATE 16 MG: 10 INJECTION, SOLUTION INTRAMUSCULAR; INTRAVENOUS at 10:26

## 2023-11-08 RX ADMIN — IRINOTECAN HYDROCHLORIDE 420 MG: 20 INJECTION, SOLUTION INTRAVENOUS at 11:04

## 2023-11-08 ASSESSMENT — PAIN SCALES - GENERAL: PAINLEVEL: 0-NO PAIN

## 2023-11-09 ASSESSMENT — ENCOUNTER SYMPTOMS
DIARRHEA: 1
NAUSEA: 1

## 2023-11-10 ENCOUNTER — INFUSION (OUTPATIENT)
Dept: HEMATOLOGY/ONCOLOGY | Facility: CLINIC | Age: 68
End: 2023-11-10
Payer: MEDICARE

## 2023-11-10 VITALS
BODY MASS INDEX: 35.73 KG/M2 | HEART RATE: 78 BPM | SYSTOLIC BLOOD PRESSURE: 138 MMHG | DIASTOLIC BLOOD PRESSURE: 84 MMHG | RESPIRATION RATE: 16 BRPM | TEMPERATURE: 97.3 F | OXYGEN SATURATION: 96 % | WEIGHT: 250.66 LBS

## 2023-11-10 DIAGNOSIS — C18.9 MALIGNANT NEOPLASM OF COLON, UNSPECIFIED PART OF COLON (MULTI): ICD-10-CM

## 2023-11-10 DIAGNOSIS — C78.7 METASTATIC COLON CANCER TO LIVER (MULTI): ICD-10-CM

## 2023-11-10 DIAGNOSIS — C18.9 METASTATIC COLON CANCER TO LIVER (MULTI): ICD-10-CM

## 2023-11-10 PROCEDURE — 2500000004 HC RX 250 GENERAL PHARMACY W/ HCPCS (ALT 636 FOR OP/ED): Mod: JZ

## 2023-11-10 PROCEDURE — 96374 THER/PROPH/DIAG INJ IV PUSH: CPT | Mod: INF

## 2023-11-10 PROCEDURE — 2500000004 HC RX 250 GENERAL PHARMACY W/ HCPCS (ALT 636 FOR OP/ED): Performed by: INTERNAL MEDICINE

## 2023-11-10 PROCEDURE — 36593 DECLOT VASCULAR DEVICE: CPT

## 2023-11-10 PROCEDURE — 96361 HYDRATE IV INFUSION ADD-ON: CPT | Mod: INF

## 2023-11-10 PROCEDURE — 96360 HYDRATION IV INFUSION INIT: CPT | Mod: INF

## 2023-11-10 PROCEDURE — 96523 IRRIG DRUG DELIVERY DEVICE: CPT

## 2023-11-10 PROCEDURE — 2500000004 HC RX 250 GENERAL PHARMACY W/ HCPCS (ALT 636 FOR OP/ED): Mod: JZ | Performed by: INTERNAL MEDICINE

## 2023-11-10 RX ORDER — PALONOSETRON 0.05 MG/ML
250 INJECTION, SOLUTION INTRAVENOUS ONCE
Status: COMPLETED | OUTPATIENT
Start: 2023-11-10 | End: 2023-11-10

## 2023-11-10 RX ORDER — HEPARIN 100 UNIT/ML
500 SYRINGE INTRAVENOUS AS NEEDED
Status: CANCELLED | OUTPATIENT
Start: 2023-11-10

## 2023-11-10 RX ORDER — HEPARIN SODIUM,PORCINE/PF 10 UNIT/ML
50 SYRINGE (ML) INTRAVENOUS AS NEEDED
Status: CANCELLED | OUTPATIENT
Start: 2023-11-10

## 2023-11-10 RX ADMIN — PALONOSETRON 250 MCG: 0.05 INJECTION, SOLUTION INTRAVENOUS at 11:54

## 2023-11-10 RX ADMIN — SODIUM CHLORIDE 1000 ML: 9 INJECTION, SOLUTION INTRAVENOUS at 12:04

## 2023-11-10 RX ADMIN — ALTEPLASE 2 MG: 2.2 INJECTION, POWDER, LYOPHILIZED, FOR SOLUTION INTRAVENOUS at 11:46

## 2023-11-10 ASSESSMENT — PAIN SCALES - GENERAL: PAINLEVEL: 0-NO PAIN

## 2023-11-15 DIAGNOSIS — E11.9 TYPE 2 DIABETES MELLITUS WITHOUT COMPLICATION, WITHOUT LONG-TERM CURRENT USE OF INSULIN (MULTI): ICD-10-CM

## 2023-11-15 DIAGNOSIS — I10 HYPERTENSION, UNSPECIFIED TYPE: ICD-10-CM

## 2023-11-15 RX ORDER — GLIMEPIRIDE 4 MG/1
TABLET ORAL
Qty: 180 TABLET | Refills: 0 | Status: SHIPPED | OUTPATIENT
Start: 2023-11-15 | End: 2024-02-13

## 2023-11-15 RX ORDER — METFORMIN HYDROCHLORIDE 500 MG/1
1000 TABLET, EXTENDED RELEASE ORAL 2 TIMES DAILY
Qty: 360 TABLET | Refills: 0 | Status: SHIPPED | OUTPATIENT
Start: 2023-11-15 | End: 2024-02-13

## 2023-11-15 RX ORDER — LOSARTAN POTASSIUM 50 MG/1
100 TABLET ORAL DAILY
Qty: 180 TABLET | Refills: 0 | Status: SHIPPED | OUTPATIENT
Start: 2023-11-15 | End: 2024-02-13

## 2023-11-22 ENCOUNTER — INFUSION (OUTPATIENT)
Dept: HEMATOLOGY/ONCOLOGY | Facility: CLINIC | Age: 68
End: 2023-11-22
Payer: MEDICARE

## 2023-11-22 ENCOUNTER — OFFICE VISIT (OUTPATIENT)
Dept: HEMATOLOGY/ONCOLOGY | Facility: CLINIC | Age: 68
End: 2023-11-22
Payer: MEDICARE

## 2023-11-22 VITALS
HEART RATE: 82 BPM | TEMPERATURE: 96.8 F | RESPIRATION RATE: 18 BRPM | OXYGEN SATURATION: 96 % | BODY MASS INDEX: 35.51 KG/M2 | WEIGHT: 249.12 LBS | SYSTOLIC BLOOD PRESSURE: 109 MMHG | DIASTOLIC BLOOD PRESSURE: 71 MMHG

## 2023-11-22 DIAGNOSIS — C18.9 METASTATIC COLON CANCER TO LIVER (MULTI): ICD-10-CM

## 2023-11-22 DIAGNOSIS — F32.89 OTHER DEPRESSION: ICD-10-CM

## 2023-11-22 DIAGNOSIS — C18.9 MALIGNANT NEOPLASM OF COLON, UNSPECIFIED PART OF COLON (MULTI): Primary | ICD-10-CM

## 2023-11-22 DIAGNOSIS — C18.9 MALIGNANT NEOPLASM OF COLON, UNSPECIFIED PART OF COLON (MULTI): ICD-10-CM

## 2023-11-22 DIAGNOSIS — I10 PRIMARY HYPERTENSION: ICD-10-CM

## 2023-11-22 DIAGNOSIS — E11.65 TYPE 2 DIABETES MELLITUS WITH HYPERGLYCEMIA, WITH LONG-TERM CURRENT USE OF INSULIN (MULTI): ICD-10-CM

## 2023-11-22 DIAGNOSIS — C78.7 METASTATIC COLON CANCER TO LIVER (MULTI): ICD-10-CM

## 2023-11-22 DIAGNOSIS — F41.8 DEPRESSION WITH ANXIETY: Primary | ICD-10-CM

## 2023-11-22 DIAGNOSIS — R97.20 ELEVATED PSA: ICD-10-CM

## 2023-11-22 DIAGNOSIS — Z79.4 TYPE 2 DIABETES MELLITUS WITH HYPERGLYCEMIA, WITH LONG-TERM CURRENT USE OF INSULIN (MULTI): ICD-10-CM

## 2023-11-22 LAB
ALBUMIN SERPL BCP-MCNC: 4.2 G/DL (ref 3.4–5)
ALP SERPL-CCNC: 78 U/L (ref 33–136)
ALT SERPL W P-5'-P-CCNC: 26 U/L (ref 10–52)
ANION GAP SERPL CALC-SCNC: 13 MMOL/L (ref 10–20)
AST SERPL W P-5'-P-CCNC: 14 U/L (ref 9–39)
BASOPHILS # BLD AUTO: 0.03 X10*3/UL (ref 0–0.1)
BASOPHILS NFR BLD AUTO: 0.5 %
BILIRUB SERPL-MCNC: 0.5 MG/DL (ref 0–1.2)
BUN SERPL-MCNC: 16 MG/DL (ref 6–23)
CALCIUM SERPL-MCNC: 9.6 MG/DL (ref 8.6–10.3)
CEA SERPL-MCNC: 3.1 UG/L
CHLORIDE SERPL-SCNC: 103 MMOL/L (ref 98–107)
CO2 SERPL-SCNC: 27 MMOL/L (ref 21–32)
CREAT SERPL-MCNC: 1.07 MG/DL (ref 0.5–1.3)
EOSINOPHIL # BLD AUTO: 0.25 X10*3/UL (ref 0–0.7)
EOSINOPHIL NFR BLD AUTO: 3.8 %
ERYTHROCYTE [DISTWIDTH] IN BLOOD BY AUTOMATED COUNT: 15.1 % (ref 11.5–14.5)
GFR SERPL CREATININE-BSD FRML MDRD: 76 ML/MIN/1.73M*2
GLUCOSE SERPL-MCNC: 170 MG/DL (ref 74–99)
HCT VFR BLD AUTO: 39.7 % (ref 41–52)
HGB BLD-MCNC: 13.1 G/DL (ref 13.5–17.5)
IMM GRANULOCYTES # BLD AUTO: 0.01 X10*3/UL (ref 0–0.7)
IMM GRANULOCYTES NFR BLD AUTO: 0.2 % (ref 0–0.9)
LYMPHOCYTES # BLD AUTO: 1.58 X10*3/UL (ref 1.2–4.8)
LYMPHOCYTES NFR BLD AUTO: 24 %
MCH RBC QN AUTO: 31 PG (ref 26–34)
MCHC RBC AUTO-ENTMCNC: 33 G/DL (ref 32–36)
MCV RBC AUTO: 94 FL (ref 80–100)
MONOCYTES # BLD AUTO: 0.56 X10*3/UL (ref 0.1–1)
MONOCYTES NFR BLD AUTO: 8.5 %
NEUTROPHILS # BLD AUTO: 4.14 X10*3/UL (ref 1.2–7.7)
NEUTROPHILS NFR BLD AUTO: 63 %
PLATELET # BLD AUTO: 305 X10*3/UL (ref 150–450)
POTASSIUM SERPL-SCNC: 4 MMOL/L (ref 3.5–5.3)
PROT SERPL-MCNC: 6.6 G/DL (ref 6.4–8.2)
RBC # BLD AUTO: 4.22 X10*6/UL (ref 4.5–5.9)
SODIUM SERPL-SCNC: 139 MMOL/L (ref 136–145)
WBC # BLD AUTO: 6.6 X10*3/UL (ref 4.4–11.3)

## 2023-11-22 PROCEDURE — 1126F AMNT PAIN NOTED NONE PRSNT: CPT | Performed by: INTERNAL MEDICINE

## 2023-11-22 PROCEDURE — 96376 TX/PRO/DX INJ SAME DRUG ADON: CPT

## 2023-11-22 PROCEDURE — 4010F ACE/ARB THERAPY RXD/TAKEN: CPT | Performed by: INTERNAL MEDICINE

## 2023-11-22 PROCEDURE — 96411 CHEMO IV PUSH ADDL DRUG: CPT

## 2023-11-22 PROCEDURE — 3074F SYST BP LT 130 MM HG: CPT | Performed by: INTERNAL MEDICINE

## 2023-11-22 PROCEDURE — 99214 OFFICE O/P EST MOD 30 MIN: CPT | Performed by: INTERNAL MEDICINE

## 2023-11-22 PROCEDURE — 3078F DIAST BP <80 MM HG: CPT | Performed by: INTERNAL MEDICINE

## 2023-11-22 PROCEDURE — 96416 CHEMO PROLONG INFUSE W/PUMP: CPT

## 2023-11-22 PROCEDURE — 3052F HG A1C>EQUAL 8.0%<EQUAL 9.0%: CPT | Performed by: INTERNAL MEDICINE

## 2023-11-22 PROCEDURE — 2500000004 HC RX 250 GENERAL PHARMACY W/ HCPCS (ALT 636 FOR OP/ED): Performed by: INTERNAL MEDICINE

## 2023-11-22 PROCEDURE — 96413 CHEMO IV INFUSION 1 HR: CPT

## 2023-11-22 PROCEDURE — 96375 TX/PRO/DX INJ NEW DRUG ADDON: CPT | Mod: INF

## 2023-11-22 PROCEDURE — 1036F TOBACCO NON-USER: CPT | Performed by: INTERNAL MEDICINE

## 2023-11-22 PROCEDURE — 80053 COMPREHEN METABOLIC PANEL: CPT

## 2023-11-22 PROCEDURE — 85025 COMPLETE CBC W/AUTO DIFF WBC: CPT

## 2023-11-22 PROCEDURE — 82378 CARCINOEMBRYONIC ANTIGEN: CPT

## 2023-11-22 PROCEDURE — 1159F MED LIST DOCD IN RCRD: CPT | Performed by: INTERNAL MEDICINE

## 2023-11-22 RX ORDER — HEPARIN 100 UNIT/ML
500 SYRINGE INTRAVENOUS AS NEEDED
Status: DISCONTINUED | OUTPATIENT
Start: 2023-11-22 | End: 2023-11-22 | Stop reason: HOSPADM

## 2023-11-22 RX ORDER — DIPHENHYDRAMINE HYDROCHLORIDE 50 MG/ML
50 INJECTION INTRAMUSCULAR; INTRAVENOUS AS NEEDED
Status: CANCELLED | OUTPATIENT
Start: 2023-12-06

## 2023-11-22 RX ORDER — ALBUTEROL SULFATE 0.83 MG/ML
3 SOLUTION RESPIRATORY (INHALATION) AS NEEDED
Status: DISCONTINUED | OUTPATIENT
Start: 2023-11-22 | End: 2023-11-22 | Stop reason: HOSPADM

## 2023-11-22 RX ORDER — MIRTAZAPINE 15 MG/1
15 TABLET, FILM COATED ORAL NIGHTLY
Qty: 30 TABLET | Refills: 2 | Status: SHIPPED
Start: 2023-11-22 | End: 2024-01-04 | Stop reason: ALTCHOICE

## 2023-11-22 RX ORDER — FAMOTIDINE 10 MG/ML
20 INJECTION INTRAVENOUS ONCE AS NEEDED
Status: DISCONTINUED | OUTPATIENT
Start: 2023-11-22 | End: 2023-11-22 | Stop reason: HOSPADM

## 2023-11-22 RX ORDER — EPINEPHRINE 0.3 MG/.3ML
0.3 INJECTION SUBCUTANEOUS EVERY 5 MIN PRN
Status: DISCONTINUED | OUTPATIENT
Start: 2023-11-22 | End: 2023-11-22 | Stop reason: HOSPADM

## 2023-11-22 RX ORDER — FLUOROURACIL 50 MG/ML
400 INJECTION, SOLUTION INTRAVENOUS ONCE
Status: CANCELLED | OUTPATIENT
Start: 2023-11-22

## 2023-11-22 RX ORDER — FLUOROURACIL 50 MG/ML
400 INJECTION, SOLUTION INTRAVENOUS ONCE
Status: CANCELLED | OUTPATIENT
Start: 2023-12-06

## 2023-11-22 RX ORDER — EPINEPHRINE 0.3 MG/.3ML
0.3 INJECTION SUBCUTANEOUS EVERY 5 MIN PRN
Status: CANCELLED | OUTPATIENT
Start: 2023-11-22

## 2023-11-22 RX ORDER — PROCHLORPERAZINE MALEATE 10 MG
10 TABLET ORAL EVERY 6 HOURS PRN
Status: CANCELLED | OUTPATIENT
Start: 2023-11-22

## 2023-11-22 RX ORDER — ALBUTEROL SULFATE 0.83 MG/ML
3 SOLUTION RESPIRATORY (INHALATION) AS NEEDED
Status: CANCELLED | OUTPATIENT
Start: 2023-11-22

## 2023-11-22 RX ORDER — ATROPINE SULFATE 0.4 MG/ML
0.4 INJECTION, SOLUTION ENDOTRACHEAL; INTRAMEDULLARY; INTRAMUSCULAR; INTRAVENOUS; SUBCUTANEOUS
Status: CANCELLED | OUTPATIENT
Start: 2023-11-22

## 2023-11-22 RX ORDER — FAMOTIDINE 10 MG/ML
20 INJECTION INTRAVENOUS ONCE AS NEEDED
Status: CANCELLED | OUTPATIENT
Start: 2023-11-22

## 2023-11-22 RX ORDER — EPINEPHRINE 0.3 MG/.3ML
0.3 INJECTION SUBCUTANEOUS EVERY 5 MIN PRN
Status: CANCELLED | OUTPATIENT
Start: 2023-12-06

## 2023-11-22 RX ORDER — ATROPINE SULFATE 0.4 MG/ML
0.4 INJECTION, SOLUTION ENDOTRACHEAL; INTRAMEDULLARY; INTRAMUSCULAR; INTRAVENOUS; SUBCUTANEOUS
Status: COMPLETED | OUTPATIENT
Start: 2023-11-22 | End: 2023-11-22

## 2023-11-22 RX ORDER — FAMOTIDINE 10 MG/ML
20 INJECTION INTRAVENOUS ONCE AS NEEDED
Status: CANCELLED | OUTPATIENT
Start: 2023-12-06

## 2023-11-22 RX ORDER — FAMOTIDINE 10 MG/ML
20 INJECTION INTRAVENOUS ONCE
Status: CANCELLED
Start: 2023-11-22 | End: 2023-11-22

## 2023-11-22 RX ORDER — FAMOTIDINE 10 MG/ML
20 INJECTION INTRAVENOUS ONCE
Status: COMPLETED | OUTPATIENT
Start: 2023-11-22 | End: 2023-11-22

## 2023-11-22 RX ORDER — HEPARIN SODIUM,PORCINE/PF 10 UNIT/ML
50 SYRINGE (ML) INTRAVENOUS AS NEEDED
Status: CANCELLED | OUTPATIENT
Start: 2023-11-22

## 2023-11-22 RX ORDER — HEPARIN SODIUM,PORCINE/PF 10 UNIT/ML
50 SYRINGE (ML) INTRAVENOUS AS NEEDED
Status: DISCONTINUED | OUTPATIENT
Start: 2023-11-22 | End: 2023-11-22 | Stop reason: HOSPADM

## 2023-11-22 RX ORDER — PALONOSETRON 0.05 MG/ML
250 INJECTION, SOLUTION INTRAVENOUS ONCE
Status: CANCELLED
Start: 2023-12-08 | End: 2023-12-08

## 2023-11-22 RX ORDER — PALONOSETRON 0.05 MG/ML
250 INJECTION, SOLUTION INTRAVENOUS ONCE
Status: CANCELLED
Start: 2023-11-24 | End: 2023-11-24

## 2023-11-22 RX ORDER — DIPHENHYDRAMINE HYDROCHLORIDE 50 MG/ML
50 INJECTION INTRAMUSCULAR; INTRAVENOUS AS NEEDED
Status: DISCONTINUED | OUTPATIENT
Start: 2023-11-22 | End: 2023-11-22 | Stop reason: HOSPADM

## 2023-11-22 RX ORDER — HEPARIN 100 UNIT/ML
500 SYRINGE INTRAVENOUS AS NEEDED
Status: CANCELLED | OUTPATIENT
Start: 2023-11-22

## 2023-11-22 RX ORDER — FAMOTIDINE 10 MG/ML
20 INJECTION INTRAVENOUS ONCE
Status: CANCELLED
Start: 2023-12-06 | End: 2023-12-06

## 2023-11-22 RX ORDER — ALBUTEROL SULFATE 0.83 MG/ML
3 SOLUTION RESPIRATORY (INHALATION) AS NEEDED
Status: CANCELLED | OUTPATIENT
Start: 2023-12-06

## 2023-11-22 RX ORDER — ATROPINE SULFATE 0.4 MG/ML
0.4 INJECTION, SOLUTION ENDOTRACHEAL; INTRAMEDULLARY; INTRAMUSCULAR; INTRAVENOUS; SUBCUTANEOUS
Status: CANCELLED | OUTPATIENT
Start: 2023-12-06

## 2023-11-22 RX ORDER — PROCHLORPERAZINE MALEATE 10 MG
10 TABLET ORAL EVERY 6 HOURS PRN
Status: CANCELLED | OUTPATIENT
Start: 2023-12-06

## 2023-11-22 RX ORDER — FLUOROURACIL 50 MG/ML
400 INJECTION, SOLUTION INTRAVENOUS ONCE
Status: DISCONTINUED | OUTPATIENT
Start: 2023-11-22 | End: 2023-11-22 | Stop reason: RX

## 2023-11-22 RX ORDER — MIRTAZAPINE 15 MG/1
15 TABLET, FILM COATED ORAL NIGHTLY
COMMUNITY
End: 2023-11-22 | Stop reason: SDUPTHER

## 2023-11-22 RX ORDER — DIPHENHYDRAMINE HYDROCHLORIDE 50 MG/ML
50 INJECTION INTRAMUSCULAR; INTRAVENOUS AS NEEDED
Status: CANCELLED | OUTPATIENT
Start: 2023-11-22

## 2023-11-22 RX ADMIN — IRINOTECAN HYDROCHLORIDE 420 MG: 20 INJECTION, SOLUTION INTRAVENOUS at 13:10

## 2023-11-22 RX ADMIN — FAMOTIDINE 20 MG: 10 INJECTION INTRAVENOUS at 12:23

## 2023-11-22 RX ADMIN — FLUOROURACIL 5650 MG: 50 INJECTION, SOLUTION INTRAVENOUS at 14:56

## 2023-11-22 RX ADMIN — ONDANSETRON 16 MG: 2 INJECTION INTRAMUSCULAR; INTRAVENOUS at 12:44

## 2023-11-22 RX ADMIN — ATROPINE SULFATE 0.4 MG: 0.4 INJECTION, SOLUTION INTRAVENOUS at 13:07

## 2023-11-22 RX ADMIN — DEXAMETHASONE SODIUM PHOSPHATE 16 MG: 10 INJECTION, SOLUTION INTRAMUSCULAR; INTRAVENOUS at 12:26

## 2023-11-22 RX ADMIN — ATROPINE SULFATE 0.4 MG: 0.4 INJECTION, SOLUTION INTRAVENOUS at 14:53

## 2023-11-22 ASSESSMENT — PAIN SCALES - GENERAL: PAINLEVEL: 2

## 2023-11-22 NOTE — SIGNIFICANT EVENT
11/22/23 1124   Prechemo Checklist   Has the patient been in the hospital, ED, or urgent care since last date of service No   Chemo/Immuno Consent Signed Yes   Protocol/Indications Verified Yes   Confirmed to previous date/time of medication Yes  (C7D1 today)   Compared to previous dose Yes   All medications are dated accurately Yes   Pregnancy Test Negative Not applicable   Parameters Met Yes   BSA/Weight-Height Verified Yes   Dose Calculations Verified Yes

## 2023-11-22 NOTE — PROGRESS NOTES
Patient ID: Richard Hicks is a 68 y.o. male.  Referring Physician: Luis Palacios MD  [address]  [place] [id number]  Primary Care Provider: Anand Adkins MD  Visit Type: Follow Up      Subjective    HPI  I feel very depressed, and this is the time of year    Review of Systems   Constitutional:  Positive for fatigue.   HENT:  Negative.     Eyes: Negative.    Respiratory: Negative.     Cardiovascular: Negative.    Gastrointestinal:  Positive for diarrhea.   Endocrine: Negative.    Genitourinary: Negative.     Musculoskeletal: Negative.    Skin: Negative.    Neurological: Negative.    Hematological: Negative.    Psychiatric/Behavioral:  Positive for depression.         Objective   BSA: 2.37 meters squared  /71   Pulse 82   Temp 36 °C (96.8 °F) (Temporal)   Resp 18   Wt 113 kg (249 lb 1.9 oz)   SpO2 96%   BMI 35.51 kg/m²      has a past medical history of Personal history of other diseases of the circulatory system, Personal history of other endocrine, nutritional and metabolic disease, Personal history of other malignant neoplasm of skin, and Type 2 diabetes mellitus without complications (CMS/HCC) (09/12/2020).   has a past surgical history that includes Other surgical history (04/09/2014); Tonsillectomy (04/09/2014); Other surgical history (09/30/2021); Other surgical history (09/30/2021); Other surgical history (09/12/2020); CT guided percutaneous biopsy liver (2/24/2022); and CT guided percutaneous biopsy lung (8/1/2023).  No family history on file.  Oncology History   Colon cancer (CMS/HCC)   8/29/2023 -  Chemotherapy    FOLFIRI (Fluorouracil Continuous Infusion / Leucovorin / Irinotecan), 14 Day Cycles     9/13/2023 Initial Diagnosis    Colon cancer (CMS/HCC)     Metastatic colon cancer to liver (CMS/HCC)   8/29/2023 -  Chemotherapy    FOLFIRI (Fluorouracil Continuous Infusion / Leucovorin / Irinotecan), 14 Day Cycles     9/13/2023 Initial Diagnosis    Metastatic colon  "cancer to liver (CMS/HCC)         Toby Hicks \"Ed\"  reports that he quit smoking about 35 years ago. His smoking use included cigarettes. He has never used smokeless tobacco.  He  reports no history of alcohol use.  He  reports no history of drug use.    Physical Exam  Vitals reviewed.   HENT:      Head: Normocephalic.      Mouth/Throat:      Mouth: Mucous membranes are moist.   Eyes:      Extraocular Movements: Extraocular movements intact.      Pupils: Pupils are equal, round, and reactive to light.   Cardiovascular:      Rate and Rhythm: Normal rate and regular rhythm.      Heart sounds: Normal heart sounds.   Pulmonary:      Breath sounds: Normal breath sounds.      Comments: Chest wall portacath  Abdominal:      General: Bowel sounds are normal.      Palpations: Abdomen is soft.   Musculoskeletal:         General: Normal range of motion.      Cervical back: Normal range of motion and neck supple.   Skin:     General: Skin is warm and dry.   Neurological:      General: No focal deficit present.      Mental Status: He is alert and oriented to person, place, and time.   Psychiatric:         Mood and Affect: Mood normal.         WBC   Date/Time Value Ref Range Status   11/08/2023 09:02 AM 14.6 (H) 4.4 - 11.3 x10*3/uL Final   10/25/2023 10:35 AM 13.9 (H) 4.4 - 11.3 x10*3/uL Final   10/10/2023 02:18 PM 14.1 (H) 4.4 - 11.3 x10*3/uL Final     nRBC   Date Value Ref Range Status   04/07/2023 0.0 0.0 - 0.0 /100 WBC Final   11/29/2022 0.0 0.0 - 0.0 /100 WBC Final   11/28/2022 0.0 0.0 - 0.0 /100 WBC Final     RBC   Date Value Ref Range Status   11/08/2023 4.08 (L) 4.50 - 5.90 x10*6/uL Final   10/25/2023 4.27 (L) 4.50 - 5.90 x10*6/uL Final   10/10/2023 4.22 (L) 4.50 - 5.90 x10*6/uL Final     Hemoglobin   Date Value Ref Range Status   11/08/2023 12.7 (L) 13.5 - 17.5 g/dL Final   10/25/2023 13.1 (L) 13.5 - 17.5 g/dL Final   10/10/2023 12.8 (L) 13.5 - 17.5 g/dL Final     Hematocrit   Date Value Ref Range Status "   11/08/2023 38.7 (L) 41.0 - 52.0 % Final   10/25/2023 40.3 (L) 41.0 - 52.0 % Final   10/10/2023 39.4 (L) 41.0 - 52.0 % Final     MCV   Date/Time Value Ref Range Status   11/08/2023 09:02 AM 95 80 - 100 fL Final   10/25/2023 10:35 AM 94 80 - 100 fL Final   10/10/2023 02:18 PM 93 80 - 100 fL Final     MCH   Date/Time Value Ref Range Status   11/08/2023 09:02 AM 31.1 26.0 - 34.0 pg Final   10/25/2023 10:35 AM 30.7 26.0 - 34.0 pg Final   10/10/2023 02:18 PM 30.3 26.0 - 34.0 pg Final     MCHC   Date/Time Value Ref Range Status   11/08/2023 09:02 AM 32.8 32.0 - 36.0 g/dL Final   10/25/2023 10:35 AM 32.5 32.0 - 36.0 g/dL Final   10/10/2023 02:18 PM 32.5 32.0 - 36.0 g/dL Final     RDW   Date/Time Value Ref Range Status   11/08/2023 09:02 AM 16.2 (H) 11.5 - 14.5 % Final   10/25/2023 10:35 AM 16.4 (H) 11.5 - 14.5 % Final   10/10/2023 02:18 PM 15.7 (H) 11.5 - 14.5 % Final     Platelets   Date/Time Value Ref Range Status   11/08/2023 09:02  150 - 450 x10*3/uL Final   10/25/2023 10:35  150 - 450 x10*3/uL Final   10/10/2023 02:18  150 - 450 x10*3/uL Final     MPV   Date/Time Value Ref Range Status   10/25/2023 10:35 AM 10.1 7.5 - 11.5 fL Final   10/10/2023 02:18 PM 10.2 7.5 - 11.5 fL Final     Neutrophils %   Date/Time Value Ref Range Status   11/08/2023 09:02 AM 74.3 40.0 - 80.0 % Final   10/25/2023 10:35 AM 74.1 40.0 - 80.0 % Final   10/10/2023 02:18 PM 74.4 40.0 - 80.0 % Final     Immature Granulocytes %, Automated   Date/Time Value Ref Range Status   11/08/2023 09:02 AM 4.1 (H) 0.0 - 0.9 % Final     Comment:     Immature Granulocyte Count (IG) includes promyelocytes, myelocytes and metamyelocytes but does not include bands. Percent differential counts (%) should be interpreted in the context of the absolute cell counts (cells/UL).   10/25/2023 10:35 AM 2.2 (H) 0.0 - 0.9 % Final     Comment:     Immature Granulocyte Count (IG) includes promyelocytes, myelocytes and metamyelocytes but does not include bands.  Percent differential counts (%) should be interpreted in the context of the absolute cell counts (cells/UL).   10/10/2023 02:18 PM 3.9 (H) 0.0 - 0.9 % Final     Comment:     Immature Granulocyte Count (IG) includes promyelocytes, myelocytes and metamyelocytes but does not include bands. Percent differential counts (%) should be interpreted in the context of the absolute cell counts (cells/UL).     Lymphocytes %   Date/Time Value Ref Range Status   11/08/2023 09:02 AM 13.9 13.0 - 44.0 % Final   10/25/2023 10:35 AM 16.0 13.0 - 44.0 % Final   10/10/2023 02:18 PM 14.3 13.0 - 44.0 % Final     Monocytes %   Date/Time Value Ref Range Status   11/08/2023 09:02 AM 5.8 2.0 - 10.0 % Final   10/25/2023 10:35 AM 5.3 2.0 - 10.0 % Final   10/10/2023 02:18 PM 5.7 2.0 - 10.0 % Final     Eosinophils %   Date/Time Value Ref Range Status   11/08/2023 09:02 AM 1.7 0.0 - 6.0 % Final   10/25/2023 10:35 AM 2.0 0.0 - 6.0 % Final   10/10/2023 02:18 PM 1.3 0.0 - 6.0 % Final     Basophils %   Date/Time Value Ref Range Status   11/08/2023 09:02 AM 0.2 0.0 - 2.0 % Final   10/25/2023 10:35 AM 0.4 0.0 - 2.0 % Final   10/10/2023 02:18 PM 0.4 0.0 - 2.0 % Final     Neutrophils Absolute   Date/Time Value Ref Range Status   11/08/2023 09:02 AM 10.83 (H) 1.20 - 7.70 x10*3/uL Final     Comment:     Percent differential counts (%) should be interpreted in the context of the absolute cell counts (cells/uL).   10/25/2023 10:35 AM 10.25 (H) 1.20 - 7.70 x10*3/uL Final     Comment:     Percent differential counts (%) should be interpreted in the context of the absolute cell counts (cells/uL).   10/10/2023 02:18 PM 10.48 (H) 1.20 - 7.70 x10*3/uL Final     Comment:     Percent differential counts (%) should be interpreted in the context of the absolute cell counts (cells/uL).     Immature Granulocytes Absolute, Automated   Date/Time Value Ref Range Status   11/08/2023 09:02 AM 0.60 0.00 - 0.70 x10*3/uL Final   10/25/2023 10:35 AM 0.31 0.00 - 0.70 x10*3/uL Final  "  10/10/2023 02:18 PM 0.55 0.00 - 0.70 x10*3/uL Final     Lymphocytes Absolute   Date/Time Value Ref Range Status   11/08/2023 09:02 AM 2.03 1.20 - 4.80 x10*3/uL Final   10/25/2023 10:35 AM 2.22 1.20 - 4.80 x10*3/uL Final   10/10/2023 02:18 PM 2.01 1.20 - 4.80 x10*3/uL Final     Monocytes Absolute   Date/Time Value Ref Range Status   11/08/2023 09:02 AM 0.84 0.10 - 1.00 x10*3/uL Final   10/25/2023 10:35 AM 0.73 0.10 - 1.00 x10*3/uL Final   10/10/2023 02:18 PM 0.80 0.10 - 1.00 x10*3/uL Final     Eosinophils Absolute   Date/Time Value Ref Range Status   11/08/2023 09:02 AM 0.25 0.00 - 0.70 x10*3/uL Final   10/25/2023 10:35 AM 0.28 0.00 - 0.70 x10*3/uL Final   10/10/2023 02:18 PM 0.18 0.00 - 0.70 x10*3/uL Final     Basophils Absolute   Date/Time Value Ref Range Status   11/08/2023 09:02 AM 0.03 0.00 - 0.10 x10*3/uL Final   10/25/2023 10:35 AM 0.06 0.00 - 0.10 x10*3/uL Final   10/10/2023 02:18 PM 0.05 0.00 - 0.10 x10*3/uL Final       No components found for: \"PT\"  aPTT   Date/Time Value Ref Range Status   06/16/2022 01:53 PM 30 26 - 39 sec Final     Comment:       THE APTT IS NO LONGER USED FOR MONITORING     UNFRACTIONATED HEPARIN THERAPY.    FOR MONITORING HEPARIN THERAPY,     USE THE HEPARIN ASSAY.     04/16/2022 02:41 PM 23 (L) 26 - 39 sec Final     Comment:       THE APTT IS NO LONGER USED FOR MONITORING     UNFRACTIONATED HEPARIN THERAPY.    FOR MONITORING HEPARIN THERAPY,     USE THE HEPARIN ASSAY.         Assessment/Plan    1) stage IV colon cancer  - Presents for cycle #7 of FOLFIRI  -port was accessed today to check CBC, COMP, CEA  - Labs reviewed: WBC 6.6, hgb 13.1, plt 305,000, ANC 4140, creatinine 1.07, alk phos 78, AST 14, ALT 26, CEA 3.1  -he reports too many side effects from the neulasta and has been declining the neulasta  -will rescan him after cycle #8  -benefits, risks, potential morbidity related to FOLFIRI were reviewed with Ed and he provided informed consent to proceed  --on day 1 he will " receive ondansetron IV + decadron IV + pepcid IV + atropine IV followed by irinotecan 180 mg/m2 IV + leucovorin 400 mg/m2 IV + 5FU 400 mg/m2 IV push then protracted 5FU 2400 mg/m2 CIV over 46 hours  -on day 3 he will receive aloxi IV + NS 1000 ml IV + followed by pump disconnect  -he reports having seasonal depression again--he says his PCP does not listen to him; offered antidepressant and he agreed-will try remeron 15 mg PO QHS  -he will return in 2 weeks for FOLFIRI #8         2) elevated PSA  -last PSA was 6.98 (1/16/2023)     3) diabetes  -on metformin  -on glimepiride  -on insulin lantus     4) hypertension  -on losartan        Problem List Items Addressed This Visit             ICD-10-CM    Colon cancer (CMS/HCC) - Primary C18.9    Relevant Orders    CBC and Auto Differential (Completed)    Comprehensive Metabolic Panel (Completed)    Carcinoembryonic Antigen (Completed)    Infusion Appointment Request [facility] INFUSION    CBC and Auto Differential    Comprehensive Metabolic Panel    Carcinoembryonic Antigen    CBC and Auto Differential    Comprehensive metabolic panel    CEA (Carcinoembryonic Antigen)    CBC and Auto Differential    Comprehensive Metabolic Panel    Carcinoembryonic Antigen    Clinic Appointment Request Follow Up; ZION ROSARIO; [facility] MEDON[facility]    CT abdomen pelvis w IV contrast    CT chest w IV contrast    Infusion Appointment Request [facility] INFUSION (Completed)    Metastatic colon cancer to liver (CMS/HCC) C18.9, C78.7    Relevant Orders    CBC and Auto Differential (Completed)    Comprehensive Metabolic Panel (Completed)    Carcinoembryonic Antigen (Completed)    Infusion Appointment Request [facility] INFUSION    CBC and Auto Differential    Comprehensive Metabolic Panel    Carcinoembryonic Antigen    CBC and Auto Differential    Comprehensive metabolic panel    CEA (Carcinoembryonic Antigen)    CBC and Auto Differential    Comprehensive Metabolic Panel    Carcinoembryonic  Antigen    Clinic Appointment Request Follow Up; LUIS PALACIOS; [facility]    CT abdomen pelvis w IV contrast    CT chest w IV contrast    Infusion Appointment Request [facility] INFUSION (Completed)            Luis Palacios MD

## 2023-11-22 NOTE — PATIENT INSTRUCTIONS
You will return in 2 weeks for cycle #8 FOLFIRI,  then you will have a followup CT scan done (on 12/11/2023)  
calm

## 2023-11-24 ENCOUNTER — INFUSION (OUTPATIENT)
Dept: HEMATOLOGY/ONCOLOGY | Facility: CLINIC | Age: 68
End: 2023-11-24
Payer: MEDICARE

## 2023-11-24 VITALS
BODY MASS INDEX: 36.26 KG/M2 | SYSTOLIC BLOOD PRESSURE: 119 MMHG | RESPIRATION RATE: 17 BRPM | TEMPERATURE: 97 F | OXYGEN SATURATION: 93 % | WEIGHT: 254.41 LBS | HEART RATE: 105 BPM | DIASTOLIC BLOOD PRESSURE: 79 MMHG

## 2023-11-24 DIAGNOSIS — C78.7 METASTATIC COLON CANCER TO LIVER (MULTI): ICD-10-CM

## 2023-11-24 DIAGNOSIS — C18.9 METASTATIC COLON CANCER TO LIVER (MULTI): ICD-10-CM

## 2023-11-24 DIAGNOSIS — C18.9 MALIGNANT NEOPLASM OF COLON, UNSPECIFIED PART OF COLON (MULTI): ICD-10-CM

## 2023-11-24 PROBLEM — F32.A DEPRESSION: Status: ACTIVE | Noted: 2023-11-24

## 2023-11-24 PROCEDURE — 96361 HYDRATE IV INFUSION ADD-ON: CPT | Mod: INF

## 2023-11-24 PROCEDURE — 2500000004 HC RX 250 GENERAL PHARMACY W/ HCPCS (ALT 636 FOR OP/ED): Performed by: INTERNAL MEDICINE

## 2023-11-24 PROCEDURE — 96360 HYDRATION IV INFUSION INIT: CPT | Mod: INF

## 2023-11-24 PROCEDURE — 96374 THER/PROPH/DIAG INJ IV PUSH: CPT | Mod: INF

## 2023-11-24 RX ORDER — HEPARIN SODIUM,PORCINE/PF 10 UNIT/ML
50 SYRINGE (ML) INTRAVENOUS AS NEEDED
Status: CANCELLED | OUTPATIENT
Start: 2023-11-24

## 2023-11-24 RX ORDER — PALONOSETRON 0.05 MG/ML
250 INJECTION, SOLUTION INTRAVENOUS ONCE
Status: COMPLETED | OUTPATIENT
Start: 2023-11-24 | End: 2023-11-24

## 2023-11-24 RX ORDER — HEPARIN 100 UNIT/ML
500 SYRINGE INTRAVENOUS AS NEEDED
Status: CANCELLED | OUTPATIENT
Start: 2023-11-24

## 2023-11-24 RX ADMIN — SODIUM CHLORIDE 1000 ML: 9 INJECTION, SOLUTION INTRAVENOUS at 09:14

## 2023-11-24 RX ADMIN — PALONOSETRON 250 MCG: 0.05 INJECTION, SOLUTION INTRAVENOUS at 09:16

## 2023-11-24 ASSESSMENT — ENCOUNTER SYMPTOMS
CARDIOVASCULAR NEGATIVE: 1
NEUROLOGICAL NEGATIVE: 1
RESPIRATORY NEGATIVE: 1
DIARRHEA: 1
EYES NEGATIVE: 1
HEMATOLOGIC/LYMPHATIC NEGATIVE: 1
DEPRESSION: 1
FATIGUE: 1
ENDOCRINE NEGATIVE: 1
MUSCULOSKELETAL NEGATIVE: 1

## 2023-11-24 ASSESSMENT — PAIN SCALES - GENERAL: PAINLEVEL: 0-NO PAIN

## 2023-12-02 DIAGNOSIS — E11.9 TYPE 2 DIABETES MELLITUS WITHOUT COMPLICATION, WITHOUT LONG-TERM CURRENT USE OF INSULIN (MULTI): ICD-10-CM

## 2023-12-04 RX ORDER — INSULIN GLARGINE 100 [IU]/ML
INJECTION, SOLUTION SUBCUTANEOUS
Qty: 45 ML | Refills: 0 | Status: SHIPPED | OUTPATIENT
Start: 2023-12-04

## 2023-12-06 ENCOUNTER — NUTRITION (OUTPATIENT)
Dept: HEMATOLOGY/ONCOLOGY | Facility: CLINIC | Age: 68
End: 2023-12-06
Payer: MEDICARE

## 2023-12-06 ENCOUNTER — INFUSION (OUTPATIENT)
Dept: HEMATOLOGY/ONCOLOGY | Facility: CLINIC | Age: 68
End: 2023-12-06
Payer: MEDICARE

## 2023-12-06 VITALS
TEMPERATURE: 96.8 F | BODY MASS INDEX: 36.23 KG/M2 | DIASTOLIC BLOOD PRESSURE: 77 MMHG | OXYGEN SATURATION: 96 % | RESPIRATION RATE: 16 BRPM | HEART RATE: 89 BPM | SYSTOLIC BLOOD PRESSURE: 134 MMHG | WEIGHT: 254.19 LBS

## 2023-12-06 VITALS — BODY MASS INDEX: 36.39 KG/M2 | WEIGHT: 254.19 LBS | HEIGHT: 70 IN

## 2023-12-06 DIAGNOSIS — C78.7 METASTATIC COLON CANCER TO LIVER (MULTI): ICD-10-CM

## 2023-12-06 DIAGNOSIS — C18.9 METASTATIC COLON CANCER TO LIVER (MULTI): ICD-10-CM

## 2023-12-06 DIAGNOSIS — C18.9 MALIGNANT NEOPLASM OF COLON, UNSPECIFIED PART OF COLON (MULTI): ICD-10-CM

## 2023-12-06 LAB
ALBUMIN SERPL BCP-MCNC: 4.1 G/DL (ref 3.4–5)
ALP SERPL-CCNC: 64 U/L (ref 33–136)
ALT SERPL W P-5'-P-CCNC: 29 U/L (ref 10–52)
ANION GAP SERPL CALC-SCNC: 12 MMOL/L (ref 10–20)
AST SERPL W P-5'-P-CCNC: 16 U/L (ref 9–39)
BASOPHILS # BLD AUTO: 0.02 X10*3/UL (ref 0–0.1)
BASOPHILS NFR BLD AUTO: 0.3 %
BILIRUB SERPL-MCNC: 0.5 MG/DL (ref 0–1.2)
BUN SERPL-MCNC: 14 MG/DL (ref 6–23)
CALCIUM SERPL-MCNC: 9.3 MG/DL (ref 8.6–10.3)
CEA SERPL-MCNC: 2.8 UG/L
CHLORIDE SERPL-SCNC: 105 MMOL/L (ref 98–107)
CO2 SERPL-SCNC: 25 MMOL/L (ref 21–32)
CREAT SERPL-MCNC: 1 MG/DL (ref 0.5–1.3)
EOSINOPHIL # BLD AUTO: 0.37 X10*3/UL (ref 0–0.7)
EOSINOPHIL NFR BLD AUTO: 6 %
ERYTHROCYTE [DISTWIDTH] IN BLOOD BY AUTOMATED COUNT: 14.4 % (ref 11.5–14.5)
GFR SERPL CREATININE-BSD FRML MDRD: 82 ML/MIN/1.73M*2
GLUCOSE SERPL-MCNC: 190 MG/DL (ref 74–99)
HCT VFR BLD AUTO: 38.6 % (ref 41–52)
HGB BLD-MCNC: 12.8 G/DL (ref 13.5–17.5)
IMM GRANULOCYTES # BLD AUTO: 0.01 X10*3/UL (ref 0–0.7)
IMM GRANULOCYTES NFR BLD AUTO: 0.2 % (ref 0–0.9)
LYMPHOCYTES # BLD AUTO: 1.41 X10*3/UL (ref 1.2–4.8)
LYMPHOCYTES NFR BLD AUTO: 23 %
MCH RBC QN AUTO: 31.1 PG (ref 26–34)
MCHC RBC AUTO-ENTMCNC: 33.2 G/DL (ref 32–36)
MCV RBC AUTO: 94 FL (ref 80–100)
MONOCYTES # BLD AUTO: 0.48 X10*3/UL (ref 0.1–1)
MONOCYTES NFR BLD AUTO: 7.8 %
NEUTROPHILS # BLD AUTO: 3.85 X10*3/UL (ref 1.2–7.7)
NEUTROPHILS NFR BLD AUTO: 62.7 %
PLATELET # BLD AUTO: 249 X10*3/UL (ref 150–450)
POTASSIUM SERPL-SCNC: 3.9 MMOL/L (ref 3.5–5.3)
PROT SERPL-MCNC: 6.5 G/DL (ref 6.4–8.2)
RBC # BLD AUTO: 4.12 X10*6/UL (ref 4.5–5.9)
SODIUM SERPL-SCNC: 138 MMOL/L (ref 136–145)
WBC # BLD AUTO: 6.1 X10*3/UL (ref 4.4–11.3)

## 2023-12-06 PROCEDURE — 96376 TX/PRO/DX INJ SAME DRUG ADON: CPT

## 2023-12-06 PROCEDURE — 96375 TX/PRO/DX INJ NEW DRUG ADDON: CPT | Mod: INF

## 2023-12-06 PROCEDURE — 96416 CHEMO PROLONG INFUSE W/PUMP: CPT

## 2023-12-06 PROCEDURE — 85025 COMPLETE CBC W/AUTO DIFF WBC: CPT

## 2023-12-06 PROCEDURE — 2500000004 HC RX 250 GENERAL PHARMACY W/ HCPCS (ALT 636 FOR OP/ED): Performed by: INTERNAL MEDICINE

## 2023-12-06 PROCEDURE — 82378 CARCINOEMBRYONIC ANTIGEN: CPT

## 2023-12-06 PROCEDURE — 96411 CHEMO IV PUSH ADDL DRUG: CPT

## 2023-12-06 PROCEDURE — 80053 COMPREHEN METABOLIC PANEL: CPT

## 2023-12-06 PROCEDURE — 96413 CHEMO IV INFUSION 1 HR: CPT

## 2023-12-06 RX ORDER — HEPARIN 100 UNIT/ML
500 SYRINGE INTRAVENOUS AS NEEDED
Status: DISCONTINUED | OUTPATIENT
Start: 2023-12-06 | End: 2023-12-06 | Stop reason: HOSPADM

## 2023-12-06 RX ORDER — HEPARIN SODIUM,PORCINE/PF 10 UNIT/ML
50 SYRINGE (ML) INTRAVENOUS AS NEEDED
Status: DISCONTINUED | OUTPATIENT
Start: 2023-12-06 | End: 2023-12-06 | Stop reason: HOSPADM

## 2023-12-06 RX ORDER — HEPARIN SODIUM,PORCINE/PF 10 UNIT/ML
50 SYRINGE (ML) INTRAVENOUS AS NEEDED
Status: CANCELLED | OUTPATIENT
Start: 2023-12-06

## 2023-12-06 RX ORDER — ALBUTEROL SULFATE 0.83 MG/ML
3 SOLUTION RESPIRATORY (INHALATION) AS NEEDED
Status: DISCONTINUED | OUTPATIENT
Start: 2023-12-06 | End: 2023-12-06 | Stop reason: HOSPADM

## 2023-12-06 RX ORDER — DIPHENHYDRAMINE HYDROCHLORIDE 50 MG/ML
50 INJECTION INTRAMUSCULAR; INTRAVENOUS AS NEEDED
Status: DISCONTINUED | OUTPATIENT
Start: 2023-12-06 | End: 2023-12-06 | Stop reason: HOSPADM

## 2023-12-06 RX ORDER — FLUOROURACIL 50 MG/ML
400 INJECTION, SOLUTION INTRAVENOUS ONCE
Status: DISCONTINUED | OUTPATIENT
Start: 2023-12-06 | End: 2023-12-06 | Stop reason: RX

## 2023-12-06 RX ORDER — HEPARIN 100 UNIT/ML
500 SYRINGE INTRAVENOUS AS NEEDED
Status: CANCELLED | OUTPATIENT
Start: 2023-12-06

## 2023-12-06 RX ORDER — FAMOTIDINE 10 MG/ML
20 INJECTION INTRAVENOUS ONCE
Status: COMPLETED | OUTPATIENT
Start: 2023-12-06 | End: 2023-12-06

## 2023-12-06 RX ORDER — ATROPINE SULFATE 0.4 MG/ML
0.4 INJECTION, SOLUTION ENDOTRACHEAL; INTRAMEDULLARY; INTRAMUSCULAR; INTRAVENOUS; SUBCUTANEOUS
Status: COMPLETED | OUTPATIENT
Start: 2023-12-06 | End: 2023-12-06

## 2023-12-06 RX ORDER — FAMOTIDINE 10 MG/ML
20 INJECTION INTRAVENOUS ONCE AS NEEDED
Status: DISCONTINUED | OUTPATIENT
Start: 2023-12-06 | End: 2023-12-06 | Stop reason: HOSPADM

## 2023-12-06 RX ORDER — EPINEPHRINE 0.3 MG/.3ML
0.3 INJECTION SUBCUTANEOUS EVERY 5 MIN PRN
Status: DISCONTINUED | OUTPATIENT
Start: 2023-12-06 | End: 2023-12-06 | Stop reason: HOSPADM

## 2023-12-06 RX ADMIN — ONDANSETRON 16 MG: 2 INJECTION INTRAMUSCULAR; INTRAVENOUS at 11:48

## 2023-12-06 RX ADMIN — ATROPINE SULFATE 0.4 MG: 0.4 INJECTION, SOLUTION INTRAVENOUS at 12:16

## 2023-12-06 RX ADMIN — DEXAMETHASONE SODIUM PHOSPHATE 16 MG: 10 INJECTION, SOLUTION INTRAMUSCULAR; INTRAVENOUS at 11:21

## 2023-12-06 RX ADMIN — FAMOTIDINE 20 MG: 10 INJECTION INTRAVENOUS at 11:18

## 2023-12-06 RX ADMIN — IRINOTECAN HYDROCHLORIDE 420 MG: 20 INJECTION, SOLUTION INTRAVENOUS at 12:18

## 2023-12-06 RX ADMIN — FLUOROURACIL 5650 MG: 50 INJECTION, SOLUTION INTRAVENOUS at 14:15

## 2023-12-06 RX ADMIN — ATROPINE SULFATE 0.4 MG: 0.4 INJECTION, SOLUTION INTRAVENOUS at 14:11

## 2023-12-06 NOTE — PROGRESS NOTES
"NUTRITION Assessment NOTE    Nutrition Assessment     Reason for Visit:  Toby Hicks \"Ed\" is a 68 y.o. male who presents for FOLFIRI.     Lab Results   Component Value Date/Time    GLUCOSE 190 (H) 12/06/2023 1006     12/06/2023 1006    K 3.9 12/06/2023 1006     12/06/2023 1006    CO2 25 12/06/2023 1006    ANIONGAP 12 12/06/2023 1006    BUN 14 12/06/2023 1006    CREATININE 1.00 12/06/2023 1006    EGFR 82 12/06/2023 1006    CALCIUM 9.3 12/06/2023 1006    ALBUMIN 4.1 12/06/2023 1006    ALKPHOS 64 12/06/2023 1006    PROT 6.5 12/06/2023 1006    AST 16 12/06/2023 1006    BILITOT 0.5 12/06/2023 1006    ALT 29 12/06/2023 1006       Anthropometrics:  Anthropometrics  Height: 178.4 cm (5' 10.24\")  Weight: 115 kg (254 lb 3.1 oz)  BMI (Calculated): 36.23  IBW/kg (Dietitian Calculated): 75.45 kg  Percent of IBW: 152.82 %  Adjusted Body Weight (kg): 85.41 kg    Wt Readings from Last 10 Encounters:   12/06/23 115 kg (254 lb 3.1 oz)   12/06/23 115 kg (254 lb 3.1 oz)   11/24/23 115 kg (254 lb 6.6 oz)   11/22/23 113 kg (249 lb 1.9 oz)   11/10/23 114 kg (250 lb 10.6 oz)   11/08/23 113 kg (249 lb 1.9 oz)   10/27/23 115 kg (252 lb 10.4 oz)   10/25/23 113 kg (249 lb 5.4 oz)   10/13/23 112 kg (247 lb 5.7 oz)   10/11/23 114 kg (250 lb 7.1 oz)        Food And Nutrient Intake:    Started Remeron for SAD, 15mg PO HS. Reports an increased appetite with Remeron. He is aware that his BG is elevated. Some nausea after treatment, but reports that this is manageable. Also having diarrhea, mostly 1x/day. If he has diarrhea more than once per day, he makes sure to replace fluid losses.     Patient declines any diet intervention or education at this time. I did let him know that I am available should he have any concerns in the future.     RD contact information provided. This service will continue to follow PRN per nutrition consult.        " Informed patient...  Upon review of the account I see a letter of appeal was sent to the payor on 8/12/22 for visit# [id number]. In addition, the balance is still pending to insurance.    Patient verbalized understanding.

## 2023-12-08 ENCOUNTER — INFUSION (OUTPATIENT)
Dept: HEMATOLOGY/ONCOLOGY | Facility: CLINIC | Age: 68
End: 2023-12-08
Payer: MEDICARE

## 2023-12-08 VITALS
HEART RATE: 91 BPM | WEIGHT: 254.19 LBS | RESPIRATION RATE: 16 BRPM | TEMPERATURE: 97.2 F | OXYGEN SATURATION: 94 % | BODY MASS INDEX: 36.23 KG/M2 | SYSTOLIC BLOOD PRESSURE: 128 MMHG | DIASTOLIC BLOOD PRESSURE: 79 MMHG

## 2023-12-08 DIAGNOSIS — C78.7 METASTATIC COLON CANCER TO LIVER (MULTI): ICD-10-CM

## 2023-12-08 DIAGNOSIS — C18.9 METASTATIC COLON CANCER TO LIVER (MULTI): ICD-10-CM

## 2023-12-08 DIAGNOSIS — C18.9 MALIGNANT NEOPLASM OF COLON, UNSPECIFIED PART OF COLON (MULTI): ICD-10-CM

## 2023-12-08 PROCEDURE — 2500000004 HC RX 250 GENERAL PHARMACY W/ HCPCS (ALT 636 FOR OP/ED): Performed by: INTERNAL MEDICINE

## 2023-12-08 PROCEDURE — 96374 THER/PROPH/DIAG INJ IV PUSH: CPT | Mod: INF

## 2023-12-08 PROCEDURE — 96361 HYDRATE IV INFUSION ADD-ON: CPT | Mod: INF

## 2023-12-08 RX ORDER — PALONOSETRON 0.05 MG/ML
250 INJECTION, SOLUTION INTRAVENOUS ONCE
Status: COMPLETED | OUTPATIENT
Start: 2023-12-08 | End: 2023-12-08

## 2023-12-08 RX ORDER — HEPARIN SODIUM,PORCINE/PF 10 UNIT/ML
50 SYRINGE (ML) INTRAVENOUS AS NEEDED
Status: DISCONTINUED | OUTPATIENT
Start: 2023-12-08 | End: 2023-12-08 | Stop reason: HOSPADM

## 2023-12-08 RX ORDER — HEPARIN SODIUM,PORCINE/PF 10 UNIT/ML
50 SYRINGE (ML) INTRAVENOUS AS NEEDED
Status: CANCELLED | OUTPATIENT
Start: 2023-12-08

## 2023-12-08 RX ADMIN — PALONOSETRON 250 MCG: 0.05 INJECTION, SOLUTION INTRAVENOUS at 13:04

## 2023-12-08 RX ADMIN — SODIUM CHLORIDE 1000 ML: 9 INJECTION, SOLUTION INTRAVENOUS at 13:04

## 2023-12-08 ASSESSMENT — PAIN SCALES - GENERAL: PAINLEVEL: 0-NO PAIN

## 2023-12-11 ENCOUNTER — ANCILLARY PROCEDURE (OUTPATIENT)
Dept: RADIOLOGY | Facility: CLINIC | Age: 68
End: 2023-12-11
Payer: MEDICARE

## 2023-12-11 DIAGNOSIS — C18.9 METASTATIC COLON CANCER TO LIVER (MULTI): ICD-10-CM

## 2023-12-11 DIAGNOSIS — C78.7 METASTATIC COLON CANCER TO LIVER (MULTI): ICD-10-CM

## 2023-12-11 DIAGNOSIS — C18.9 MALIGNANT NEOPLASM OF COLON, UNSPECIFIED PART OF COLON (MULTI): ICD-10-CM

## 2023-12-11 PROCEDURE — 71260 CT THORAX DX C+: CPT

## 2023-12-11 PROCEDURE — 74177 CT ABD & PELVIS W/CONTRAST: CPT

## 2023-12-11 PROCEDURE — 74177 CT ABD & PELVIS W/CONTRAST: CPT | Performed by: RADIOLOGY

## 2023-12-11 PROCEDURE — 2550000001 HC RX 255 CONTRASTS: Performed by: INTERNAL MEDICINE

## 2023-12-11 PROCEDURE — 71260 CT THORAX DX C+: CPT | Performed by: RADIOLOGY

## 2023-12-11 RX ADMIN — IOHEXOL 75 ML: 350 INJECTION, SOLUTION INTRAVENOUS at 11:00

## 2023-12-13 ENCOUNTER — OFFICE VISIT (OUTPATIENT)
Dept: HEMATOLOGY/ONCOLOGY | Facility: CLINIC | Age: 68
End: 2023-12-13
Payer: MEDICARE

## 2023-12-13 VITALS
SYSTOLIC BLOOD PRESSURE: 115 MMHG | OXYGEN SATURATION: 94 % | WEIGHT: 248.68 LBS | RESPIRATION RATE: 18 BRPM | DIASTOLIC BLOOD PRESSURE: 68 MMHG | BODY MASS INDEX: 35.44 KG/M2 | HEART RATE: 112 BPM | TEMPERATURE: 97 F

## 2023-12-13 DIAGNOSIS — C18.9 METASTATIC COLON CANCER TO LIVER (MULTI): ICD-10-CM

## 2023-12-13 DIAGNOSIS — R97.20 ELEVATED PSA: ICD-10-CM

## 2023-12-13 DIAGNOSIS — C78.7 METASTATIC COLON CANCER TO LIVER (MULTI): ICD-10-CM

## 2023-12-13 DIAGNOSIS — C18.9 MALIGNANT NEOPLASM OF COLON, UNSPECIFIED PART OF COLON (MULTI): ICD-10-CM

## 2023-12-13 DIAGNOSIS — E11.65 TYPE 2 DIABETES MELLITUS WITH HYPERGLYCEMIA, WITH LONG-TERM CURRENT USE OF INSULIN (MULTI): Primary | ICD-10-CM

## 2023-12-13 DIAGNOSIS — Z79.4 TYPE 2 DIABETES MELLITUS WITH HYPERGLYCEMIA, WITH LONG-TERM CURRENT USE OF INSULIN (MULTI): Primary | ICD-10-CM

## 2023-12-13 PROCEDURE — 4010F ACE/ARB THERAPY RXD/TAKEN: CPT | Performed by: INTERNAL MEDICINE

## 2023-12-13 PROCEDURE — 1036F TOBACCO NON-USER: CPT | Performed by: INTERNAL MEDICINE

## 2023-12-13 PROCEDURE — 1159F MED LIST DOCD IN RCRD: CPT | Performed by: INTERNAL MEDICINE

## 2023-12-13 PROCEDURE — 99214 OFFICE O/P EST MOD 30 MIN: CPT | Performed by: INTERNAL MEDICINE

## 2023-12-13 PROCEDURE — 1126F AMNT PAIN NOTED NONE PRSNT: CPT | Performed by: INTERNAL MEDICINE

## 2023-12-13 PROCEDURE — 3074F SYST BP LT 130 MM HG: CPT | Performed by: INTERNAL MEDICINE

## 2023-12-13 PROCEDURE — 3078F DIAST BP <80 MM HG: CPT | Performed by: INTERNAL MEDICINE

## 2023-12-13 PROCEDURE — 3052F HG A1C>EQUAL 8.0%<EQUAL 9.0%: CPT | Performed by: INTERNAL MEDICINE

## 2023-12-13 ASSESSMENT — PAIN SCALES - GENERAL: PAINLEVEL: 0-NO PAIN

## 2023-12-13 NOTE — PROGRESS NOTES
"Patient ID: Richard Hicks is a 68 y.o. male.  Referring Physician: Luis Palacios MD  [id number] [facility] [address] [place] [address]  Primary Care Provider: Anand Adkins MD  Visit Type: Follow Up      Subjective    HPI How was my CT scan?    Review of Systems   Constitutional: Negative.    HENT:  Negative.     Eyes: Negative.    Respiratory: Negative.     Cardiovascular: Negative.    Gastrointestinal: Negative.    Endocrine: Negative.    Genitourinary: Negative.     Musculoskeletal: Negative.    Skin: Negative.    Neurological: Negative.    Hematological: Negative.    Psychiatric/Behavioral:  Positive for confusion.         Objective   BSA: 2.37 meters squared  /68   Pulse (!) 112   Temp 36.1 °C (97 °F)   Resp 18   Wt 113 kg (248 lb 10.9 oz)   SpO2 94%   BMI 35.44 kg/m²      has a past medical history of Personal history of other diseases of the circulatory system, Personal history of other endocrine, nutritional and metabolic disease, Personal history of other malignant neoplasm of skin, and Type 2 diabetes mellitus without complications (CMS/HCC) (09/12/2020).   has a past surgical history that includes Other surgical history (04/09/2014); Tonsillectomy (04/09/2014); Other surgical history (09/30/2021); Other surgical history (09/30/2021); Other surgical history (09/12/2020); CT guided percutaneous biopsy liver (2/24/2022); and CT guided percutaneous biopsy lung (8/1/2023).  No family history on file.  Oncology History   Colon cancer (CMS/HCC)   8/29/2023 -  Chemotherapy    FOLFIRI (Fluorouracil Continuous Infusion / Leucovorin / Irinotecan), 14 Day Cycles     9/13/2023 Initial Diagnosis    Colon cancer (CMS/HCC)     Metastatic colon cancer to liver (CMS/HCC)   8/29/2023 -  Chemotherapy    FOLFIRI (Fluorouracil Continuous Infusion / Leucovorin / Irinotecan), 14 Day Cycles     9/13/2023 Initial Diagnosis    Metastatic colon cancer to liver (CMS/HCC)         Toby Hicks \"Ed\"  reports " that he quit smoking about 35 years ago. His smoking use included cigarettes. He has never used smokeless tobacco.  He  reports no history of alcohol use.  He  reports no history of drug use.    Physical Exam  Vitals reviewed.   HENT:      Head: Normocephalic.      Mouth/Throat:      Mouth: Mucous membranes are moist.   Eyes:      Conjunctiva/sclera: Conjunctivae normal.      Pupils: Pupils are equal, round, and reactive to light.   Cardiovascular:      Rate and Rhythm: Normal rate and regular rhythm.      Pulses: Normal pulses.      Heart sounds: Normal heart sounds.   Pulmonary:      Breath sounds: Normal breath sounds.   Abdominal:      General: Bowel sounds are normal.      Palpations: Abdomen is soft.   Musculoskeletal:         General: Normal range of motion.      Cervical back: Normal range of motion and neck supple.   Skin:     General: Skin is warm and dry.   Neurological:      General: No focal deficit present.      Mental Status: He is alert and oriented to person, place, and time.   Psychiatric:         Mood and Affect: Mood normal.         WBC   Date/Time Value Ref Range Status   12/06/2023 10:06 AM 6.1 4.4 - 11.3 x10*3/uL Final   11/22/2023 10:39 AM 6.6 4.4 - 11.3 x10*3/uL Final   11/08/2023 09:02 AM 14.6 (H) 4.4 - 11.3 x10*3/uL Final     nRBC   Date Value Ref Range Status   04/07/2023 0.0 0.0 - 0.0 /100 WBC Final   11/29/2022 0.0 0.0 - 0.0 /100 WBC Final   11/28/2022 0.0 0.0 - 0.0 /100 WBC Final     RBC   Date Value Ref Range Status   12/06/2023 4.12 (L) 4.50 - 5.90 x10*6/uL Final   11/22/2023 4.22 (L) 4.50 - 5.90 x10*6/uL Final   11/08/2023 4.08 (L) 4.50 - 5.90 x10*6/uL Final     Hemoglobin   Date Value Ref Range Status   12/06/2023 12.8 (L) 13.5 - 17.5 g/dL Final   11/22/2023 13.1 (L) 13.5 - 17.5 g/dL Final   11/08/2023 12.7 (L) 13.5 - 17.5 g/dL Final     Hematocrit   Date Value Ref Range Status   12/06/2023 38.6 (L) 41.0 - 52.0 % Final   11/22/2023 39.7 (L) 41.0 - 52.0 % Final   11/08/2023 38.7 (L)  41.0 - 52.0 % Final     MCV   Date/Time Value Ref Range Status   12/06/2023 10:06 AM 94 80 - 100 fL Final   11/22/2023 10:39 AM 94 80 - 100 fL Final   11/08/2023 09:02 AM 95 80 - 100 fL Final     MCH   Date/Time Value Ref Range Status   12/06/2023 10:06 AM 31.1 26.0 - 34.0 pg Final   11/22/2023 10:39 AM 31.0 26.0 - 34.0 pg Final   11/08/2023 09:02 AM 31.1 26.0 - 34.0 pg Final     MCHC   Date/Time Value Ref Range Status   12/06/2023 10:06 AM 33.2 32.0 - 36.0 g/dL Final   11/22/2023 10:39 AM 33.0 32.0 - 36.0 g/dL Final   11/08/2023 09:02 AM 32.8 32.0 - 36.0 g/dL Final     RDW   Date/Time Value Ref Range Status   12/06/2023 10:06 AM 14.4 11.5 - 14.5 % Final   11/22/2023 10:39 AM 15.1 (H) 11.5 - 14.5 % Final   11/08/2023 09:02 AM 16.2 (H) 11.5 - 14.5 % Final     Platelets   Date/Time Value Ref Range Status   12/06/2023 10:06  150 - 450 x10*3/uL Final   11/22/2023 10:39  150 - 450 x10*3/uL Final   11/08/2023 09:02  150 - 450 x10*3/uL Final     MPV   Date/Time Value Ref Range Status   10/25/2023 10:35 AM 10.1 7.5 - 11.5 fL Final   10/10/2023 02:18 PM 10.2 7.5 - 11.5 fL Final     Neutrophils %   Date/Time Value Ref Range Status   12/06/2023 10:06 AM 62.7 40.0 - 80.0 % Final   11/22/2023 10:39 AM 63.0 40.0 - 80.0 % Final   11/08/2023 09:02 AM 74.3 40.0 - 80.0 % Final     Immature Granulocytes %, Automated   Date/Time Value Ref Range Status   12/06/2023 10:06 AM 0.2 0.0 - 0.9 % Final     Comment:     Immature Granulocyte Count (IG) includes promyelocytes, myelocytes and metamyelocytes but does not include bands. Percent differential counts (%) should be interpreted in the context of the absolute cell counts (cells/UL).   11/22/2023 10:39 AM 0.2 0.0 - 0.9 % Final     Comment:     Immature Granulocyte Count (IG) includes promyelocytes, myelocytes and metamyelocytes but does not include bands. Percent differential counts (%) should be interpreted in the context of the absolute cell counts (cells/UL).    11/08/2023 09:02 AM 4.1 (H) 0.0 - 0.9 % Final     Comment:     Immature Granulocyte Count (IG) includes promyelocytes, myelocytes and metamyelocytes but does not include bands. Percent differential counts (%) should be interpreted in the context of the absolute cell counts (cells/UL).     Lymphocytes %   Date/Time Value Ref Range Status   12/06/2023 10:06 AM 23.0 13.0 - 44.0 % Final   11/22/2023 10:39 AM 24.0 13.0 - 44.0 % Final   11/08/2023 09:02 AM 13.9 13.0 - 44.0 % Final     Monocytes %   Date/Time Value Ref Range Status   12/06/2023 10:06 AM 7.8 2.0 - 10.0 % Final   11/22/2023 10:39 AM 8.5 2.0 - 10.0 % Final   11/08/2023 09:02 AM 5.8 2.0 - 10.0 % Final     Eosinophils %   Date/Time Value Ref Range Status   12/06/2023 10:06 AM 6.0 0.0 - 6.0 % Final   11/22/2023 10:39 AM 3.8 0.0 - 6.0 % Final   11/08/2023 09:02 AM 1.7 0.0 - 6.0 % Final     Basophils %   Date/Time Value Ref Range Status   12/06/2023 10:06 AM 0.3 0.0 - 2.0 % Final   11/22/2023 10:39 AM 0.5 0.0 - 2.0 % Final   11/08/2023 09:02 AM 0.2 0.0 - 2.0 % Final     Neutrophils Absolute   Date/Time Value Ref Range Status   12/06/2023 10:06 AM 3.85 1.20 - 7.70 x10*3/uL Final     Comment:     Percent differential counts (%) should be interpreted in the context of the absolute cell counts (cells/uL).   11/22/2023 10:39 AM 4.14 1.20 - 7.70 x10*3/uL Final     Comment:     Percent differential counts (%) should be interpreted in the context of the absolute cell counts (cells/uL).   11/08/2023 09:02 AM 10.83 (H) 1.20 - 7.70 x10*3/uL Final     Comment:     Percent differential counts (%) should be interpreted in the context of the absolute cell counts (cells/uL).     Immature Granulocytes Absolute, Automated   Date/Time Value Ref Range Status   12/06/2023 10:06 AM 0.01 0.00 - 0.70 x10*3/uL Final   11/22/2023 10:39 AM 0.01 0.00 - 0.70 x10*3/uL Final   11/08/2023 09:02 AM 0.60 0.00 - 0.70 x10*3/uL Final     Lymphocytes Absolute   Date/Time Value Ref Range Status  "  12/06/2023 10:06 AM 1.41 1.20 - 4.80 x10*3/uL Final   11/22/2023 10:39 AM 1.58 1.20 - 4.80 x10*3/uL Final   11/08/2023 09:02 AM 2.03 1.20 - 4.80 x10*3/uL Final     Monocytes Absolute   Date/Time Value Ref Range Status   12/06/2023 10:06 AM 0.48 0.10 - 1.00 x10*3/uL Final   11/22/2023 10:39 AM 0.56 0.10 - 1.00 x10*3/uL Final   11/08/2023 09:02 AM 0.84 0.10 - 1.00 x10*3/uL Final     Eosinophils Absolute   Date/Time Value Ref Range Status   12/06/2023 10:06 AM 0.37 0.00 - 0.70 x10*3/uL Final   11/22/2023 10:39 AM 0.25 0.00 - 0.70 x10*3/uL Final   11/08/2023 09:02 AM 0.25 0.00 - 0.70 x10*3/uL Final     Basophils Absolute   Date/Time Value Ref Range Status   12/06/2023 10:06 AM 0.02 0.00 - 0.10 x10*3/uL Final   11/22/2023 10:39 AM 0.03 0.00 - 0.10 x10*3/uL Final   11/08/2023 09:02 AM 0.03 0.00 - 0.10 x10*3/uL Final       No components found for: \"PT\"  aPTT   Date/Time Value Ref Range Status   06/16/2022 01:53 PM 30 26 - 39 sec Final     Comment:       THE APTT IS NO LONGER USED FOR MONITORING     UNFRACTIONATED HEPARIN THERAPY.    FOR MONITORING HEPARIN THERAPY,     USE THE HEPARIN ASSAY.     04/16/2022 02:41 PM 23 (L) 26 - 39 sec Final     Comment:       THE APTT IS NO LONGER USED FOR MONITORING     UNFRACTIONATED HEPARIN THERAPY.    FOR MONITORING HEPARIN THERAPY,     USE THE HEPARIN ASSAY.       Medication Documentation Review Audit       Reviewed by Radha Hull MA (Medical Assistant) on 12/13/23 at 1340      Medication Order Taking? Sig Documenting Provider Last Dose Status   acetaminophen (Tylenol) 325 mg tablet [id number] Yes Take 2-3 tablets (650-975 mg) by mouth once daily as needed. Historical Provider, MD Taking Active   acyclovir (Zovirax) 5 % cream [id number] Yes Apply topically every 2 hours.  APPLY TO THE AFFECTED AREA EVERY 2 HOURS AS DIRECTED. Historical Provider, MD Taking Active   albuterol 90 mcg/actuation inhaler [id number] Yes Inhale.  INHALE PUFFS 2 puff(s) inhaled prn Historical Provider, MD " Taking Active   blood sugar diagnostic (CareTouch Test Strip) strip [id number] Yes 3 times a day. Historical Provider, MD Taking Active   blood sugar diagnostic (OneTouch Verio test strips) strip [id number] Yes TEST BID Anand Adkins MD Taking Active   cholecalciferol (Vitamin D-3) 25 MCG (1000 UT) capsule [id number] Yes Take 1 capsule (25 mcg) by mouth once daily. Historical MD Jeaneth Taking Active   enoxaparin sodium (LOVENOX SUBQ) [id number] Yes LOVENOX INJECTION as directed Historical MD Jeaneth Taking Active   flaxseed oiL 1,000 mg capsule [id number] Yes Take 1 capsule (1,000 mg) by mouth once daily. Historical Provider, MD Taking Active   glimepiride (Amaryl) 4 mg tablet [id number] Yes TAKE 2 TABLETS BY MOUTH ONCE  DAILY IN THE MORNING . TAKE  BEFORE MEALS . Anand Adkins MD Taking Active   insulin glargine (Lantus Solostar U-100 Insulin) 100 unit/mL (3 mL) pen [id number] Yes INJECT SUBCUTANEOUSLY 40 UNITS  AT BEDTIME Anand Adkins MD Taking Active   lancets misc [id number] Yes lancets for blood glucose testing   Quantity: 1   Refills: 0   Ordered: 29-Nov-2022  Carole James Start: 29-Nov-2022  Generic Substitution Allowed Lakshmi Eric MD Taking Active   losartan (Cozaar) 50 mg tablet [id number] Yes TAKE 2 TABLETS BY MOUTH ONCE  DAILY Anand Adkins MD Taking Active   metFORMIN  mg 24 hr tablet [id number] Yes TAKE 2 TABLETS BY MOUTH TWICE  DAILY Anand Adkins MD Taking Active   mirtazapine (Remeron) 15 mg tablet [id number] Yes Take 1 tablet (15 mg) by mouth once daily at bedtime. Luis Palacios MD Taking Active   omega-3 fatty acids-fish oil (Fish OiL) 340-1,000 mg capsule [id number] Yes Take 1 capsule by mouth once daily. Historical Provider, MD Taking Active   pantoprazole (ProtoNix) 40 mg EC tablet [id number] Yes Take 1 tablet (40 mg) by mouth once daily. Lakshmi Eric MD Taking Active   predniSONE (Deltasone) 20 mg tablet [id number] Yes Take 1 tablet (20 mg) by  mouth 2 times a day. Historical Provider, MD Taking Active   zinc sulfate (Zincate) 220 (50 Zn) MG capsule [id number] Yes Take 1 capsule (50 mg of elemental zinc) by mouth once daily. Historical Provider, MD Taking Active                   Assessment/Plan    1) stage IV colon cancer  -was diagnosed with liver mets at time of colon cancer diagnosis (transverse colon); FNA of 1.3 cm lesion in segment 1 of liver showed metastatic adenocarcinoma  -he received neoadjuvant FOLFOX, then had laparoscopic hemicolectomy, Dr Preciado performed microwave ablation of liver lesion  -then completed FOLFOX (12 cycles total)  -unfortunately earlier this year relapsed in his lungs  -s/p FOLFIRI x 8 cycles  -has been having worsening cumulative confusion--the other day he called a friend by the wrong name  -reviewed latest CT scan--ablation zone in liver is stable, no new liver mets; no new lung nodules; left lower lobe lung nodule (bx proven to be metastatic colon cancer) is unchanged  -will give him a break from systemic therapy  -will refer him to thoracic surgery (Dr Mcgill)     2) elevated PSA  -last PSA was 6.98 (1/16/2023)     3) diabetes  -on metformin  -on glimepiride  -on insulin lantus     4) hypertension  -on losartan        Problem List Items Addressed This Visit             ICD-10-CM    Colon cancer (CMS/HCC) C18.9    Relevant Orders    Referral to Thoracic Surgery    Infusion Appointment Request [facility] INFUSION    CBC and Auto Differential    Comprehensive Metabolic Panel    Carcinoembryonic Antigen    Clinic Appointment Request Follow Up; ZION ROSARIO; [facility] MEDONC1    CBC and Auto Differential    Comprehensive metabolic panel    CEA    CT chest w IV contrast    CT abdomen pelvis w IV contrast    Metastatic colon cancer to liver (CMS/HCC) C18.9, C78.7    Relevant Orders    Referral to Thoracic Surgery    Infusion Appointment Request [facility] INFUSION    CBC and Auto Differential    Comprehensive Metabolic  Panel    Carcinoembryonic Antigen    Clinic Appointment Request Follow Up; LUIS PALACIOS; [facility] MEDONC[facility]    CBC and Auto Differential    Comprehensive metabolic panel    CEA    CT chest w IV contrast    CT abdomen pelvis w IV contrast            Luis Palacios MD

## 2023-12-14 ASSESSMENT — ENCOUNTER SYMPTOMS
MUSCULOSKELETAL NEGATIVE: 1
EYES NEGATIVE: 1
GASTROINTESTINAL NEGATIVE: 1
NEUROLOGICAL NEGATIVE: 1
RESPIRATORY NEGATIVE: 1
CONSTITUTIONAL NEGATIVE: 1
CONFUSION: 1
ENDOCRINE NEGATIVE: 1
CARDIOVASCULAR NEGATIVE: 1
HEMATOLOGIC/LYMPHATIC NEGATIVE: 1

## 2023-12-21 ENCOUNTER — OFFICE VISIT (OUTPATIENT)
Dept: SURGERY | Facility: CLINIC | Age: 68
End: 2023-12-21
Payer: MEDICARE

## 2023-12-21 VITALS
WEIGHT: 255 LBS | HEIGHT: 70 IN | TEMPERATURE: 96.4 F | SYSTOLIC BLOOD PRESSURE: 137 MMHG | HEART RATE: 104 BPM | OXYGEN SATURATION: 97 % | DIASTOLIC BLOOD PRESSURE: 68 MMHG | BODY MASS INDEX: 36.51 KG/M2

## 2023-12-21 DIAGNOSIS — C18.9 METASTATIC COLON CANCER TO LIVER (MULTI): ICD-10-CM

## 2023-12-21 DIAGNOSIS — R91.1 LUNG NODULE: Primary | ICD-10-CM

## 2023-12-21 DIAGNOSIS — C78.7 METASTATIC COLON CANCER TO LIVER (MULTI): ICD-10-CM

## 2023-12-21 DIAGNOSIS — C18.9 MALIGNANT NEOPLASM OF COLON, UNSPECIFIED PART OF COLON (MULTI): ICD-10-CM

## 2023-12-21 PROCEDURE — 1159F MED LIST DOCD IN RCRD: CPT | Performed by: STUDENT IN AN ORGANIZED HEALTH CARE EDUCATION/TRAINING PROGRAM

## 2023-12-21 PROCEDURE — 4010F ACE/ARB THERAPY RXD/TAKEN: CPT | Performed by: STUDENT IN AN ORGANIZED HEALTH CARE EDUCATION/TRAINING PROGRAM

## 2023-12-21 PROCEDURE — 1126F AMNT PAIN NOTED NONE PRSNT: CPT | Performed by: STUDENT IN AN ORGANIZED HEALTH CARE EDUCATION/TRAINING PROGRAM

## 2023-12-21 PROCEDURE — 3078F DIAST BP <80 MM HG: CPT | Performed by: STUDENT IN AN ORGANIZED HEALTH CARE EDUCATION/TRAINING PROGRAM

## 2023-12-21 PROCEDURE — 3052F HG A1C>EQUAL 8.0%<EQUAL 9.0%: CPT | Performed by: STUDENT IN AN ORGANIZED HEALTH CARE EDUCATION/TRAINING PROGRAM

## 2023-12-21 PROCEDURE — 99215 OFFICE O/P EST HI 40 MIN: CPT | Mod: 57 | Performed by: STUDENT IN AN ORGANIZED HEALTH CARE EDUCATION/TRAINING PROGRAM

## 2023-12-21 PROCEDURE — 1036F TOBACCO NON-USER: CPT | Performed by: STUDENT IN AN ORGANIZED HEALTH CARE EDUCATION/TRAINING PROGRAM

## 2023-12-21 PROCEDURE — 1160F RVW MEDS BY RX/DR IN RCRD: CPT | Performed by: STUDENT IN AN ORGANIZED HEALTH CARE EDUCATION/TRAINING PROGRAM

## 2023-12-21 PROCEDURE — 3075F SYST BP GE 130 - 139MM HG: CPT | Performed by: STUDENT IN AN ORGANIZED HEALTH CARE EDUCATION/TRAINING PROGRAM

## 2023-12-21 PROCEDURE — 99205 OFFICE O/P NEW HI 60 MIN: CPT | Performed by: STUDENT IN AN ORGANIZED HEALTH CARE EDUCATION/TRAINING PROGRAM

## 2023-12-21 NOTE — LETTER
"December 21, 2023     Luis Palacios MD  [address] [address]  [facility] [address]    Patient: Richard Hicks   YOB: 1955   Date of Visit: 12/21/2023       Dear Dr. Luis Palacios MD:    Thank you for referring Richard Hicks to me for evaluation. Below are my notes for this consultation.  If you have questions, please do not hesitate to call me. I look forward to following your patient along with you.       Sincerely,     Justyn Mcgill MD      CC: Anand Adkins MD  ______________________________________________________________________________________    HPI:   Toby Hicks \"Ed\" is a 68 y.o. male with a pmhx of stage IV [id number] colon cancer mets to liver and lung s/p neoadjuvant FOLFOX, lap extended right hemicolectomy with Dr Sotomayor, and MWA of liver lesions by Dr Preciado on 6/28/22 , DM2, HTN and former smoker who is referred to me by Dr Palacios for evaluation and treatment of metastatic colon cancer to lung. He was treated with 8 cycle of FOLFIRI last at 12/13/23.  The lung mets was first identified on CT chest on 4/2023. This was biopsied on 8/1/2023 and confirmed metastatic colon cancer. There was no other macro metastasis identified. His CEA level was at 2.8 now elevated from preop 1.5. Of note, he has developed PNA with peripheral lung parenchyma change one year ago.  He denied any shortness of breath.  But he did have some dyspnea on exertion.  He uses oxygen 2 to 3 L overnight.  He denies history of heart heart attack or stroke.  He is we have been stable.    PMHx: per HPI  PSHx: cardiac cath in 2004 no stent  SHx: former smoker (10ppy quit 30 year ago), deny ETOH, or illicit drugs   FMHx: Father has MI and stroke and leukemia.  Brother has MI.    Current Outpatient Medications:   •  acetaminophen (Tylenol) 325 mg tablet, Take 2-3 tablets (650-975 mg) by mouth once daily as needed., Disp: , Rfl:   •  acyclovir (Zovirax) 5 % cream, Apply topically every 2 hours.  APPLY TO " THE AFFECTED AREA EVERY 2 HOURS AS DIRECTED., Disp: , Rfl:   •  albuterol 90 mcg/actuation inhaler, Inhale.  INHALE PUFFS 2 puff(s) inhaled prn, Disp: , Rfl:   •  blood sugar diagnostic (CareTouch Test Strip) strip, 3 times a day., Disp: , Rfl:   •  blood sugar diagnostic (OneTouch Verio test strips) strip, TEST BID, Disp: 200 strip, Rfl: 1  •  cholecalciferol (Vitamin D-3) 25 MCG (1000 UT) capsule, Take 1 capsule (25 mcg) by mouth once daily., Disp: , Rfl:   •  enoxaparin sodium (LOVENOX SUBQ), LOVENOX INJECTION as directed, Disp: , Rfl:   •  flaxseed oiL 1,000 mg capsule, Take 1 capsule (1,000 mg) by mouth once daily., Disp: , Rfl:   •  glimepiride (Amaryl) 4 mg tablet, TAKE 2 TABLETS BY MOUTH ONCE  DAILY IN THE MORNING . TAKE  BEFORE MEALS ., Disp: 180 tablet, Rfl: 0  •  insulin glargine (Lantus Solostar U-100 Insulin) 100 unit/mL (3 mL) pen, INJECT SUBCUTANEOUSLY 40 UNITS  AT BEDTIME, Disp: 45 mL, Rfl: 0  •  lancets misc, lancets for blood glucose testing  Quantity: 1  Refills: 0  Ordered: 29-Nov-2022 Carole James Start: 29-Nov-2022 Generic Substitution Allowed, Disp: , Rfl:   •  losartan (Cozaar) 50 mg tablet, TAKE 2 TABLETS BY MOUTH ONCE  DAILY, Disp: 180 tablet, Rfl: 0  •  metFORMIN  mg 24 hr tablet, TAKE 2 TABLETS BY MOUTH TWICE  DAILY, Disp: 360 tablet, Rfl: 0  •  mirtazapine (Remeron) 15 mg tablet, Take 1 tablet (15 mg) by mouth once daily at bedtime., Disp: 30 tablet, Rfl: 2  •  omega-3 fatty acids-fish oil (Fish OiL) 340-1,000 mg capsule, Take 1 capsule by mouth once daily., Disp: , Rfl:   •  pantoprazole (ProtoNix) 40 mg EC tablet, Take 1 tablet (40 mg) by mouth once daily., Disp: , Rfl:   •  predniSONE (Deltasone) 20 mg tablet, Take 1 tablet (20 mg) by mouth 2 times a day., Disp: , Rfl:   •  zinc sulfate (Zincate) 220 (50 Zn) MG capsule, Take 1 capsule (50 mg of elemental zinc) by mouth once daily., Disp: , Rfl:    Allergies   Allergen Reactions   • Oxaliplatin Anaphylaxis and Angioedema      No  "lab exists for component: \"<CBC>\", \"<CMP>\", \"<INR>\"       ROS  General: negative for fever, chills, weight loss, night sweat  Head: negative for severe headache, vision change, blurred vision,   CV: negative for chest pain, dizziness, lightheadedness   Pulm: dyspnea on exertion, negative for shortness of breath,  hemoptysis  GI: negative for diarrhea, constipation, abdominal pain, nausea or vomiting, BRBPR  : negative for dysuria, hematuria, incontinence  Skin: negative for rash  Heme: negative for blood thinner, bleeding disorder or clotting disorder  Endo: negative for heat or cold intolerance, weight gain or weight loss  MSK: negative for rash, edema, weakness    PHYSICAL EXAM  Constitution: well-developed well-nourished male sitting in chair in no acute distress  HEENT: NCAT, moist mucosal membrane, neck supple, no crepitus, sclera anicteric  Lymph nodes: no cervical or supraclavicular lymphadenopathy  Cardiac: RRR, normal S1, S2, no mrg  Pulmonary: normal air movement, CTAP, no wcr  Abdomen: soft, non-distended, non-tender, no rigidity, guarding or rebound tenderness, no splenohepatomegaly  Neuro: AOx3, CNII-XII grossly normal  Ext: warm, dry, no edema noted  Skin: dry, clean and intact  Psych: mood and affect wnl    Assessment and Plan:  This is a 68 y.o. male former smoker with confirmed metastatic colon cancer to the lung.  I reviewed the CT scan from 11/2022, 4/11/2023, 10/18/2023, and 12/11/2023. It showed new identified solid left lower lobe nodule slowly enlarging now measuring 1.4 cm.  He has peripheral lung parenchyma change diffusely most prominently November 2022 CAT scan which has slowly improving.  I have reviewed his pathology report from 8/1/23 CT guided needle bx showing adenocarcinoma consistent with metastatic colon cancer.  I reviewed the ECHO from 11/23/22. It showed EF of 55-60%, RV function is normal, RVSP 29.6 mmHg, trace to mild TR  I reviewed the labs from 12/6/23. It showed anemia " Hgb 12.8 and normal creatinine    In my opinion, single metastasis from colon cancer to lung and otherwise controlled primary disease.  I offered the patient a minimal invasive resection of the mets.  Given the prior pneumonitis, there is a chance I have to do a decortication.  I will obtain PFT to assess his lung function.  If the lung is adequate I will proceed with surgery.    I had a candid discussion with the patient and his wife. I discussed the risk of the surgery including bleeding, infection, DVT/PE, arrhythmia, MI, stroke, airleak and postop pain. The alternative is SBRT. The patient consented to proceed with surgery.     Justyn Mcgill MD  Thoracic Surgeon  [facility]   of Medicine  [facility]  Office phone: [phone]  Fax: [phone]  Pager: [phone]

## 2023-12-21 NOTE — H&P (VIEW-ONLY)
"HPI:   Toby Hicks \"Ed\" is a 68 y.o. male with a pmhx of stage IV [id number] colon cancer mets to liver and lung s/p neoadjuvant FOLFOX, lap extended right hemicolectomy with Dr Sotomayor, and MWA of liver lesions by Dr Preciado on 6/28/22 , DM2, HTN and former smoker who is referred to me by Dr Palacios for evaluation and treatment of metastatic colon cancer to lung. He was treated with 8 cycle of FOLFIRI last at 12/13/23.  The lung mets was first identified on CT chest on 4/2023. This was biopsied on 8/1/2023 and confirmed metastatic colon cancer. There was no other macro metastasis identified. His CEA level was at 2.8 now elevated from preop 1.5. Of note, he has developed PNA with peripheral lung parenchyma change one year ago.  He denied any shortness of breath.  But he did have some dyspnea on exertion.  He uses oxygen 2 to 3 L overnight.  He denies history of heart heart attack or stroke.  He is we have been stable.    PMHx: per HPI  PSHx: cardiac cath in 2004 no stent  SHx: former smoker (10ppy quit 30 year ago), deny ETOH, or illicit drugs   FMHx: Father has MI and stroke and leukemia.  Brother has MI.    Current Outpatient Medications:     acetaminophen (Tylenol) 325 mg tablet, Take 2-3 tablets (650-975 mg) by mouth once daily as needed., Disp: , Rfl:     acyclovir (Zovirax) 5 % cream, Apply topically every 2 hours.  APPLY TO THE AFFECTED AREA EVERY 2 HOURS AS DIRECTED., Disp: , Rfl:     albuterol 90 mcg/actuation inhaler, Inhale.  INHALE PUFFS 2 puff(s) inhaled prn, Disp: , Rfl:     blood sugar diagnostic (CareTouch Test Strip) strip, 3 times a day., Disp: , Rfl:     blood sugar diagnostic (OneTouch Verio test strips) strip, TEST BID, Disp: 200 strip, Rfl: 1    cholecalciferol (Vitamin D-3) 25 MCG (1000 UT) capsule, Take 1 capsule (25 mcg) by mouth once daily., Disp: , Rfl:     enoxaparin sodium (LOVENOX SUBQ), LOVENOX INJECTION as directed, Disp: , Rfl:     flaxseed oiL 1,000 mg capsule, Take 1 capsule " "(1,000 mg) by mouth once daily., Disp: , Rfl:     glimepiride (Amaryl) 4 mg tablet, TAKE 2 TABLETS BY MOUTH ONCE  DAILY IN THE MORNING . TAKE  BEFORE MEALS ., Disp: 180 tablet, Rfl: 0    insulin glargine (Lantus Solostar U-100 Insulin) 100 unit/mL (3 mL) pen, INJECT SUBCUTANEOUSLY 40 UNITS  AT BEDTIME, Disp: 45 mL, Rfl: 0    lancets misc, lancets for blood glucose testing  Quantity: 1  Refills: 0  Ordered: 29-Nov-2022 Carole James Start: 29-Nov-2022 Generic Substitution Allowed, Disp: , Rfl:     losartan (Cozaar) 50 mg tablet, TAKE 2 TABLETS BY MOUTH ONCE  DAILY, Disp: 180 tablet, Rfl: 0    metFORMIN  mg 24 hr tablet, TAKE 2 TABLETS BY MOUTH TWICE  DAILY, Disp: 360 tablet, Rfl: 0    mirtazapine (Remeron) 15 mg tablet, Take 1 tablet (15 mg) by mouth once daily at bedtime., Disp: 30 tablet, Rfl: 2    omega-3 fatty acids-fish oil (Fish OiL) 340-1,000 mg capsule, Take 1 capsule by mouth once daily., Disp: , Rfl:     pantoprazole (ProtoNix) 40 mg EC tablet, Take 1 tablet (40 mg) by mouth once daily., Disp: , Rfl:     predniSONE (Deltasone) 20 mg tablet, Take 1 tablet (20 mg) by mouth 2 times a day., Disp: , Rfl:     zinc sulfate (Zincate) 220 (50 Zn) MG capsule, Take 1 capsule (50 mg of elemental zinc) by mouth once daily., Disp: , Rfl:    Allergies   Allergen Reactions    Oxaliplatin Anaphylaxis and Angioedema      No lab exists for component: \"<CBC>\", \"<CMP>\", \"<INR>\"       ROS  General: negative for fever, chills, weight loss, night sweat  Head: negative for severe headache, vision change, blurred vision,   CV: negative for chest pain, dizziness, lightheadedness   Pulm: dyspnea on exertion, negative for shortness of breath,  hemoptysis  GI: negative for diarrhea, constipation, abdominal pain, nausea or vomiting, BRBPR  : negative for dysuria, hematuria, incontinence  Skin: negative for rash  Heme: negative for blood thinner, bleeding disorder or clotting disorder  Endo: negative for heat or cold intolerance, " weight gain or weight loss  MSK: negative for rash, edema, weakness    PHYSICAL EXAM  Constitution: well-developed well-nourished male sitting in chair in no acute distress  HEENT: NCAT, moist mucosal membrane, neck supple, no crepitus, sclera anicteric  Lymph nodes: no cervical or supraclavicular lymphadenopathy  Cardiac: RRR, normal S1, S2, no mrg  Pulmonary: normal air movement, CTAP, no wcr  Abdomen: soft, non-distended, non-tender, no rigidity, guarding or rebound tenderness, no splenohepatomegaly  Neuro: AOx3, CNII-XII grossly normal  Ext: warm, dry, no edema noted  Skin: dry, clean and intact  Psych: mood and affect wnl    Assessment and Plan:  This is a 68 y.o. male former smoker with confirmed metastatic colon cancer to the lung.  I reviewed the CT scan from 11/2022, 4/11/2023, 10/18/2023, and 12/11/2023. It showed new identified solid left lower lobe nodule slowly enlarging now measuring 1.4 cm.  He has peripheral lung parenchyma change diffusely most prominently November 2022 CAT scan which has slowly improving.  I have reviewed his pathology report from 8/1/23 CT guided needle bx showing adenocarcinoma consistent with metastatic colon cancer.  I reviewed the ECHO from 11/23/22. It showed EF of 55-60%, RV function is normal, RVSP 29.6 mmHg, trace to mild TR  I reviewed the labs from 12/6/23. It showed anemia Hgb 12.8 and normal creatinine    In my opinion, single metastasis from colon cancer to lung and otherwise controlled primary disease.  I offered the patient a minimal invasive resection of the mets.  Given the prior pneumonitis, there is a chance I have to do a decortication.  I will obtain PFT to assess his lung function.  If the lung is adequate I will proceed with surgery.    I had a candid discussion with the patient and his wife. I discussed the risk of the surgery including bleeding, infection, DVT/PE, arrhythmia, MI, stroke, airleak and postop pain. The alternative is SBRT. The patient  consented to proceed with surgery.     Justyn Mcgill MD  Thoracic Surgeon  [facility]   of Medicine  [facility]  Office phone: [phone]  Fax: [phone]  Pager: [phone]

## 2023-12-21 NOTE — PROGRESS NOTES
"HPI:   Toby Hicks \"Ed\" is a 68 y.o. male with a pmhx of stage IV [id number] colon cancer mets to liver and lung s/p neoadjuvant FOLFOX, lap extended right hemicolectomy with Dr Sotomayor, and MWA of liver lesions by Dr Preciado on 6/28/22 , DM2, HTN and former smoker who is referred to me by Dr Palacios for evaluation and treatment of metastatic colon cancer to lung. He was treated with 8 cycle of FOLFIRI last at 12/13/23.  The lung mets was first identified on CT chest on 4/2023. This was biopsied on 8/1/2023 and confirmed metastatic colon cancer. There was no other macro metastasis identified. His CEA level was at 2.8 now elevated from preop 1.5. Of note, he has developed PNA with peripheral lung parenchyma change one year ago.  He denied any shortness of breath.  But he did have some dyspnea on exertion.  He uses oxygen 2 to 3 L overnight.  He denies history of heart heart attack or stroke.  He is we have been stable.    PMHx: per HPI  PSHx: cardiac cath in 2004 no stent  SHx: former smoker (10ppy quit 30 year ago), deny ETOH, or illicit drugs   FMHx: Father has MI and stroke and leukemia.  Brother has MI.    Current Outpatient Medications:     acetaminophen (Tylenol) 325 mg tablet, Take 2-3 tablets (650-975 mg) by mouth once daily as needed., Disp: , Rfl:     acyclovir (Zovirax) 5 % cream, Apply topically every 2 hours.  APPLY TO THE AFFECTED AREA EVERY 2 HOURS AS DIRECTED., Disp: , Rfl:     albuterol 90 mcg/actuation inhaler, Inhale.  INHALE PUFFS 2 puff(s) inhaled prn, Disp: , Rfl:     blood sugar diagnostic (CareTouch Test Strip) strip, 3 times a day., Disp: , Rfl:     blood sugar diagnostic (OneTouch Verio test strips) strip, TEST BID, Disp: 200 strip, Rfl: 1    cholecalciferol (Vitamin D-3) 25 MCG (1000 UT) capsule, Take 1 capsule (25 mcg) by mouth once daily., Disp: , Rfl:     enoxaparin sodium (LOVENOX SUBQ), LOVENOX INJECTION as directed, Disp: , Rfl:     flaxseed oiL 1,000 mg capsule, Take 1 capsule " "(1,000 mg) by mouth once daily., Disp: , Rfl:     glimepiride (Amaryl) 4 mg tablet, TAKE 2 TABLETS BY MOUTH ONCE  DAILY IN THE MORNING . TAKE  BEFORE MEALS ., Disp: 180 tablet, Rfl: 0    insulin glargine (Lantus Solostar U-100 Insulin) 100 unit/mL (3 mL) pen, INJECT SUBCUTANEOUSLY 40 UNITS  AT BEDTIME, Disp: 45 mL, Rfl: 0    lancets misc, lancets for blood glucose testing  Quantity: 1  Refills: 0  Ordered: 29-Nov-2022 Carole James Start: 29-Nov-2022 Generic Substitution Allowed, Disp: , Rfl:     losartan (Cozaar) 50 mg tablet, TAKE 2 TABLETS BY MOUTH ONCE  DAILY, Disp: 180 tablet, Rfl: 0    metFORMIN  mg 24 hr tablet, TAKE 2 TABLETS BY MOUTH TWICE  DAILY, Disp: 360 tablet, Rfl: 0    mirtazapine (Remeron) 15 mg tablet, Take 1 tablet (15 mg) by mouth once daily at bedtime., Disp: 30 tablet, Rfl: 2    omega-3 fatty acids-fish oil (Fish OiL) 340-1,000 mg capsule, Take 1 capsule by mouth once daily., Disp: , Rfl:     pantoprazole (ProtoNix) 40 mg EC tablet, Take 1 tablet (40 mg) by mouth once daily., Disp: , Rfl:     predniSONE (Deltasone) 20 mg tablet, Take 1 tablet (20 mg) by mouth 2 times a day., Disp: , Rfl:     zinc sulfate (Zincate) 220 (50 Zn) MG capsule, Take 1 capsule (50 mg of elemental zinc) by mouth once daily., Disp: , Rfl:    Allergies   Allergen Reactions    Oxaliplatin Anaphylaxis and Angioedema      No lab exists for component: \"<CBC>\", \"<CMP>\", \"<INR>\"       ROS  General: negative for fever, chills, weight loss, night sweat  Head: negative for severe headache, vision change, blurred vision,   CV: negative for chest pain, dizziness, lightheadedness   Pulm: dyspnea on exertion, negative for shortness of breath,  hemoptysis  GI: negative for diarrhea, constipation, abdominal pain, nausea or vomiting, BRBPR  : negative for dysuria, hematuria, incontinence  Skin: negative for rash  Heme: negative for blood thinner, bleeding disorder or clotting disorder  Endo: negative for heat or cold intolerance, " weight gain or weight loss  MSK: negative for rash, edema, weakness    PHYSICAL EXAM  Constitution: well-developed well-nourished male sitting in chair in no acute distress  HEENT: NCAT, moist mucosal membrane, neck supple, no crepitus, sclera anicteric  Lymph nodes: no cervical or supraclavicular lymphadenopathy  Cardiac: RRR, normal S1, S2, no mrg  Pulmonary: normal air movement, CTAP, no wcr  Abdomen: soft, non-distended, non-tender, no rigidity, guarding or rebound tenderness, no splenohepatomegaly  Neuro: AOx3, CNII-XII grossly normal  Ext: warm, dry, no edema noted  Skin: dry, clean and intact  Psych: mood and affect wnl    Assessment and Plan:  This is a 68 y.o. male former smoker with confirmed metastatic colon cancer to the lung.  I reviewed the CT scan from 11/2022, 4/11/2023, 10/18/2023, and 12/11/2023. It showed new identified solid left lower lobe nodule slowly enlarging now measuring 1.4 cm.  He has peripheral lung parenchyma change diffusely most prominently November 2022 CAT scan which has slowly improving.  I have reviewed his pathology report from 8/1/23 CT guided needle bx showing adenocarcinoma consistent with metastatic colon cancer.  I reviewed the ECHO from 11/23/22. It showed EF of 55-60%, RV function is normal, RVSP 29.6 mmHg, trace to mild TR  I reviewed the labs from 12/6/23. It showed anemia Hgb 12.8 and normal creatinine    In my opinion, single metastasis from colon cancer to lung and otherwise controlled primary disease.  I offered the patient a minimal invasive resection of the mets.  Given the prior pneumonitis, there is a chance I have to do a decortication.  I will obtain PFT to assess his lung function.  If the lung is adequate I will proceed with surgery.    I had a candid discussion with the patient and his wife. I discussed the risk of the surgery including bleeding, infection, DVT/PE, arrhythmia, MI, stroke, airleak and postop pain. The alternative is SBRT. The patient  consented to proceed with surgery.     Justyn Mcgill MD  Thoracic Surgeon  [facility]   of Medicine  [facility]  Office phone: [phone]  Fax: [phone]  Pager: [phone]

## 2023-12-27 ENCOUNTER — HOSPITAL ENCOUNTER (OUTPATIENT)
Dept: RESPIRATORY THERAPY | Facility: HOSPITAL | Age: 68
Discharge: HOME | End: 2023-12-27
Payer: MEDICARE

## 2023-12-27 DIAGNOSIS — R91.1 LUNG NODULE: ICD-10-CM

## 2023-12-28 ENCOUNTER — APPOINTMENT (OUTPATIENT)
Dept: SURGERY | Facility: CLINIC | Age: 68
End: 2023-12-28
Payer: MEDICARE

## 2024-01-01 LAB
MGC ASCENT PFT - FEV1 - POST: 2.45
MGC ASCENT PFT - FEV1 - PRE: 2.21
MGC ASCENT PFT - FEV1 - PREDICTED: 3.1
MGC ASCENT PFT - FVC - POST: 2.86
MGC ASCENT PFT - FVC - PRE: 2.71
MGC ASCENT PFT - FVC - PREDICTED: 4.09

## 2024-01-03 ENCOUNTER — TELEMEDICINE CLINICAL SUPPORT (OUTPATIENT)
Dept: PREADMISSION TESTING | Facility: HOSPITAL | Age: 69
End: 2024-01-03
Payer: MEDICARE

## 2024-01-04 ENCOUNTER — HOSPITAL ENCOUNTER (OUTPATIENT)
Dept: CARDIOLOGY | Facility: HOSPITAL | Age: 69
Discharge: HOME | End: 2024-01-04
Payer: MEDICARE

## 2024-01-04 ENCOUNTER — PRE-ADMISSION TESTING (OUTPATIENT)
Dept: PREADMISSION TESTING | Facility: HOSPITAL | Age: 69
End: 2024-01-04
Payer: MEDICARE

## 2024-01-04 VITALS
HEART RATE: 93 BPM | OXYGEN SATURATION: 94 % | WEIGHT: 254.7 LBS | BODY MASS INDEX: 36.46 KG/M2 | DIASTOLIC BLOOD PRESSURE: 86 MMHG | SYSTOLIC BLOOD PRESSURE: 123 MMHG | HEIGHT: 70 IN | TEMPERATURE: 97.6 F

## 2024-01-04 DIAGNOSIS — Z01.818 PREOPERATIVE EXAMINATION: Primary | ICD-10-CM

## 2024-01-04 DIAGNOSIS — R91.1 LUNG NODULE: ICD-10-CM

## 2024-01-04 DIAGNOSIS — Z01.818 PREOPERATIVE EXAMINATION: ICD-10-CM

## 2024-01-04 DIAGNOSIS — I10 HYPERTENSION, UNSPECIFIED TYPE: ICD-10-CM

## 2024-01-04 DIAGNOSIS — E11.9 TYPE 2 DIABETES MELLITUS WITHOUT COMPLICATION, UNSPECIFIED WHETHER LONG TERM INSULIN USE (MULTI): ICD-10-CM

## 2024-01-04 LAB
ABO GROUP (TYPE) IN BLOOD: NORMAL
ALBUMIN SERPL BCP-MCNC: 4.4 G/DL (ref 3.4–5)
ALP SERPL-CCNC: 61 U/L (ref 33–136)
ALT SERPL W P-5'-P-CCNC: 26 U/L (ref 10–52)
ANION GAP SERPL CALC-SCNC: 14 MMOL/L (ref 10–20)
ANTIBODY SCREEN: NORMAL
AST SERPL W P-5'-P-CCNC: 16 U/L (ref 9–39)
BILIRUB SERPL-MCNC: 0.4 MG/DL (ref 0–1.2)
BUN SERPL-MCNC: 17 MG/DL (ref 6–23)
CALCIUM SERPL-MCNC: 10.1 MG/DL (ref 8.6–10.6)
CHLORIDE SERPL-SCNC: 104 MMOL/L (ref 98–107)
CO2 SERPL-SCNC: 25 MMOL/L (ref 21–32)
CREAT SERPL-MCNC: 1.01 MG/DL (ref 0.5–1.3)
ERYTHROCYTE [DISTWIDTH] IN BLOOD BY AUTOMATED COUNT: 13.2 % (ref 11.5–14.5)
EST. AVERAGE GLUCOSE BLD GHB EST-MCNC: 240 MG/DL
GFR SERPL CREATININE-BSD FRML MDRD: 81 ML/MIN/1.73M*2
GLUCOSE SERPL-MCNC: 281 MG/DL (ref 74–99)
HBA1C MFR BLD: 10 %
HCT VFR BLD AUTO: 44.7 % (ref 41–52)
HGB BLD-MCNC: 14 G/DL (ref 13.5–17.5)
INR PPP: 0.9 (ref 0.9–1.1)
MCH RBC QN AUTO: 29.9 PG (ref 26–34)
MCHC RBC AUTO-ENTMCNC: 31.3 G/DL (ref 32–36)
MCV RBC AUTO: 96 FL (ref 80–100)
NRBC BLD-RTO: 0 /100 WBCS (ref 0–0)
PLATELET # BLD AUTO: 272 X10*3/UL (ref 150–450)
POTASSIUM SERPL-SCNC: 4.3 MMOL/L (ref 3.5–5.3)
PROT SERPL-MCNC: 6.8 G/DL (ref 6.4–8.2)
PROTHROMBIN TIME: 10.5 SECONDS (ref 9.8–12.8)
RBC # BLD AUTO: 4.68 X10*6/UL (ref 4.5–5.9)
RH FACTOR (ANTIGEN D): NORMAL
SODIUM SERPL-SCNC: 139 MMOL/L (ref 136–145)
WBC # BLD AUTO: 7.8 X10*3/UL (ref 4.4–11.3)

## 2024-01-04 PROCEDURE — 85610 PROTHROMBIN TIME: CPT

## 2024-01-04 PROCEDURE — 80053 COMPREHEN METABOLIC PANEL: CPT

## 2024-01-04 PROCEDURE — 93010 ELECTROCARDIOGRAM REPORT: CPT | Performed by: INTERNAL MEDICINE

## 2024-01-04 PROCEDURE — 99204 OFFICE O/P NEW MOD 45 MIN: CPT | Performed by: NURSE PRACTITIONER

## 2024-01-04 PROCEDURE — 87081 CULTURE SCREEN ONLY: CPT

## 2024-01-04 PROCEDURE — 83036 HEMOGLOBIN GLYCOSYLATED A1C: CPT

## 2024-01-04 PROCEDURE — 93005 ELECTROCARDIOGRAM TRACING: CPT

## 2024-01-04 PROCEDURE — 85027 COMPLETE CBC AUTOMATED: CPT

## 2024-01-04 PROCEDURE — 86901 BLOOD TYPING SEROLOGIC RH(D): CPT

## 2024-01-04 RX ORDER — CHLORHEXIDINE GLUCONATE 40 MG/ML
SOLUTION TOPICAL 2 TIMES DAILY
Qty: 473 ML | Refills: 0 | Status: SHIPPED
Start: 2024-01-04 | End: 2024-01-10 | Stop reason: HOSPADM

## 2024-01-04 RX ORDER — CHLORHEXIDINE GLUCONATE ORAL RINSE 1.2 MG/ML
SOLUTION DENTAL
Qty: 473 ML | Refills: 0 | Status: SHIPPED
Start: 2024-01-04 | End: 2024-01-25 | Stop reason: ALTCHOICE

## 2024-01-04 ASSESSMENT — ENCOUNTER SYMPTOMS
GASTROINTESTINAL NEGATIVE: 1
RESPIRATORY NEGATIVE: 1
EYES NEGATIVE: 1
MUSCULOSKELETAL NEGATIVE: 1
CARDIOVASCULAR NEGATIVE: 1
DYSPNEA WITH EXERTION: 1
ENDOCRINE NEGATIVE: 1
CONSTITUTIONAL NEGATIVE: 1
NECK NEGATIVE: 1
NEUROLOGICAL NEGATIVE: 1

## 2024-01-04 ASSESSMENT — DUKE ACTIVITY SCORE INDEX (DASI)
CAN YOU WALK A BLOCK OR TWO ON LEVEL GROUND: YES
TOTAL_SCORE: 58.2
CAN YOU CLIMB A FLIGHT OF STAIRS OR WALK UP A HILL: YES
CAN YOU WALK INDOORS, SUCH AS AROUND YOUR HOUSE: YES
CAN YOU PARTICIPATE IN MODERATE RECREATIONAL ACTIVITIES LIKE GOLF, BOWLING, DANCING, DOUBLES TENNIS OR THROWING A BASEBALL OR FOOTBALL: YES
CAN YOU DO MODERATE WORK AROUND THE HOUSE LIKE VACUUMING, SWEEPING FLOORS OR CARRYING GROCERIES: YES
DASI METS SCORE: 9.9
CAN YOU PARTICIPATE IN STRENOUS SPORTS LIKE SWIMMING, SINGLES TENNIS, FOOTBALL, BASKETBALL, OR SKIING: YES
CAN YOU DO LIGHT WORK AROUND THE HOUSE LIKE DUSTING OR WASHING DISHES: YES
CAN YOU RUN A SHORT DISTANCE: YES
CAN YOU DO YARD WORK LIKE RAKING LEAVES, WEEDING OR PUSHING A MOWER: YES
CAN YOU TAKE CARE OF YOURSELF (EAT, DRESS, BATHE, OR USE TOILET): YES
CAN YOU DO HEAVY WORK AROUND THE HOUSE LIKE SCRUBBING FLOORS OR LIFTING AND MOVING HEAVY FURNITURE: YES
CAN YOU HAVE SEXUAL RELATIONS: YES

## 2024-01-04 ASSESSMENT — LIFESTYLE VARIABLES: SMOKING_STATUS: NONSMOKER

## 2024-01-04 NOTE — PREPROCEDURE INSTRUCTIONS
NPO Instructions:    Do not eat any food after midnight the night before your surgery/procedure.  You may have 10 ounces of clear liquids until TWO hours before arrival for surgery/procedure. This includes water, black tea/coffee, (no milk or cream) apple juice and electrolyte drinks (Gatorade).  You may chew gum up to TWO hours before your surgery/procedure.    Additional Instructions:     The Day before Surgery:  Review your medication instructions, stop indicated medications  You will be contacted in the evening regarding the time of your arrival to facility and surgery time    Day of Surgery:  Review your medication instructions, take indicated medications  Wear  comfortable loose fitting clothing  Do not use moisturizers, creams, lotions or perfume  All jewelry and valuables should be left at home    Samantha Meeson, MSN, NP-C  Adult-Gerontology Nurse Practitioner II  Department of Anesthesiology and Perioperative Medicine  Main phone [phone]  Direct phone [phone]  Fax [phone]

## 2024-01-04 NOTE — CPM/PAT H&P
"CPM/PAT Evaluation       Name: Toby Hicks (Toby Hicks \"Ed\")  /Age: 1955/68 y.o.     Visit Type:   In-Person       Chief Complaint: lung mets scheduled for surgery    HPI: Patient is a 68-year-old male scheduled for left VATS and left lower lobe wedge resection with possible decortication on 2024 for treatment of metastasis to the lung and previous pneumonitis.  The patient is referred by Dr. Justyn Mcgill for preoperative evaluation of anemia, osteoarthritis, BPH, GERD, colon cancer with mets to the liver and lung, hypertension, hyperlipidemia, skin cancer (squamous and basal cell) status post excision, pneumonitis, LEE compliant with CPAP, IDDM, obesity.    Past Medical History:   Diagnosis Date    Anemia     Arthritis     BPH (benign prostatic hyperplasia)     GERD (gastroesophageal reflux disease)     H/O colon cancer, stage IV 2022    s/p R colectomy    Heart valve disease     trace mitral valve regurgitation. Echo 2022    Hyperlipidemia     Hypertension     Liver lesion     stable solitary liver lesion    Lung cancer (CMS/HCC)     METS from Colon cancer    Personal history of other malignant neoplasm of skin     History of malignant neoplasm of skin    Pneumonitis     Sleep apnea     Type 2 diabetes mellitus without complications (CMS/HCC) 2020    Diabetes mellitus type 2, uncomplicated       Past Surgical History:   Procedure Laterality Date    COLON SURGERY      Right colectomy    COLONOSCOPY      CT GUIDED PERCUTANEOUS BIOPSY LIVER  2022    CT GUIDED PERCUTANEOUS BIOPSY LIVER 2022 [facility]    CT GUIDED PERCUTANEOUS BIOPSY LUNG  2023    CT GUIDED PERCUTANEOUS [place]OPSY LUNG 20[place] [facility] [place]    OTHER SURGICAL HISTORY  2021    Cardiac catheterization    OTHER SURGICAL HISTORY  2021    Rhinologic surgery    OTHER SURGICAL HISTORY  2020    Skin biopsy    TONSILLECTOMY  2014    Tonsillectomy       Patient  has no history on " file for sexual activity.    Family History   Problem Relation Name Age of Onset    Stroke Father      Heart attack Father      Heart attack Brother         Allergies   Allergen Reactions    Oxaliplatin Anaphylaxis and Angioedema       Prior to Admission medications    Medication Sig Start Date End Date Taking? Authorizing Provider   acetaminophen (Tylenol) 325 mg tablet Take 2-3 tablets (650-975 mg) by mouth once daily as needed. 3/24/22   Historical Provider, MD   acyclovir (Zovirax) 5 % cream Apply topically every 2 hours.  APPLY TO THE AFFECTED AREA EVERY 2 HOURS AS DIRECTED. 3/31/23   Historical Provider, MD   blood sugar diagnostic (CareTouch Test Strip) strip 3 times a day. 3/31/23   Historical Provider, MD   blood sugar diagnostic (OneTouch Verio test strips) strip TEST BID 9/14/23   Anand Adkins MD   chlorhexidine (Hibiclens) 4 % external liquid Apply topically 2 times a day for 5 days. 1/4/24 1/9/24  Samantha A Meeson, APRN-CNP   chlorhexidine (Peridex) 0.12 % solution Swish and spit 15 mL night before surgery and morning of surgery 1/4/24   Samantha A Meeson, APRN-CNP   cholecalciferol (Vitamin D-3) 25 MCG (1000 UT) capsule Take 1 capsule (25 mcg) by mouth once daily. 11/30/22   Historical Provider, MD   flaxseed oiL 1,000 mg capsule Take 1 capsule (1,000 mg) by mouth once daily.    Historical Provider, MD   glimepiride (Amaryl) 4 mg tablet TAKE 2 TABLETS BY MOUTH ONCE  DAILY IN THE MORNING . TAKE  BEFORE MEALS . 11/15/23   Anand Adkins MD   insulin glargine (Lantus Solostar U-100 Insulin) 100 unit/mL (3 mL) pen INJECT SUBCUTANEOUSLY 40 UNITS  AT BEDTIME 12/4/23   Anand Adkins MD   lancets misc lancets for blood glucose testing   Quantity: 1   Refills: 0   Ordered: 29-Nov-2022  Carole James Start: 29-Nov-2022  Generic Substitution Allowed 11/29/22   Historical Provider, MD   losartan (Cozaar) 50 mg tablet TAKE 2 TABLETS BY MOUTH ONCE  DAILY 11/15/23   Anand Adkins MD    metFORMIN  mg 24 hr tablet TAKE 2 TABLETS BY MOUTH TWICE  DAILY 11/15/23   Anand Adkisn MD   omega-3 fatty acids-fish oil (Fish OiL) 340-1,000 mg capsule Take 1 capsule by mouth once daily.    Historical Provider, MD   zinc sulfate (Zincate) 220 (50 Zn) MG capsule Take 1 capsule (50 mg of elemental zinc) by mouth once daily.    Historical Provider, MD   albuterol 90 mcg/actuation inhaler Inhale.  INHALE PUFFS 2 puff(s) inhaled prn 11/30/22 1/3/24  Historical Provider, MD   enoxaparin sodium (LOVENOX SUBQ) LOVENOX INJECTION as directed 7/22/22 1/3/24  Historical Provider, MD   mirtazapine (Remeron) 15 mg tablet Take 1 tablet (15 mg) by mouth once daily at bedtime.  Patient not taking: Reported on 12/21/2023 11/22/23 1/4/24  Luis Palacios MD   pantoprazole (ProtoNix) 40 mg EC tablet Take 1 tablet (40 mg) by mouth once daily. 1/20/23 1/3/24  Historical Provider, MD   predniSONE (Deltasone) 20 mg tablet Take 1 tablet (20 mg) by mouth 2 times a day. 3/31/23 1/3/24  Historical Provider, MD [place] ROS:   Constitutional:   neg    Neuro/Psych:   neg    Eyes:   neg     use of corrective lenses  Ears:   neg    Nose:   neg    Mouth:   neg    Throat:   neg    Neck:   neg    Cardio:   neg     LAWRENCE  Respiratory:   neg    Endocrine:   neg    GI:   neg    :   neg    Musculoskeletal:   neg    Hematologic:   neg    Skin:  neg        Physical Exam  Vitals reviewed.   Constitutional:       Appearance: Normal appearance. He is obese.      Comments: sachi   HENT:      Head: Normocephalic.      Nose: Nose normal.      Mouth/Throat:      Mouth: Mucous membranes are moist.   Eyes:      Conjunctiva/sclera: Conjunctivae normal.   Neck:      Vascular: No carotid bruit.   Cardiovascular:      Rate and Rhythm: Normal rate and regular rhythm.      Pulses: Normal pulses.      Heart sounds: Normal heart sounds.   Pulmonary:      Effort: Pulmonary effort is normal.      Breath sounds: Normal breath sounds.   Abdominal:       Palpations: Abdomen is soft.      Tenderness: There is no abdominal tenderness.   Musculoskeletal:         General: Normal range of motion.      Cervical back: Normal range of motion.      Right lower leg: No edema.      Left lower leg: No edema.   Lymphadenopathy:      Cervical: No cervical adenopathy.   Skin:     General: Skin is warm and dry.      Capillary Refill: Capillary refill takes less than 2 seconds.   Neurological:      General: No focal deficit present.      Mental Status: He is alert and oriented to person, place, and time.   Psychiatric:         Mood and Affect: Mood normal.         Behavior: Behavior normal. Behavior is cooperative.         Thought Content: Thought content normal.         Judgment: Judgment normal.          PAT AIRWAY:   Airway:     Mallampati::  III    Neck ROM::  Full  normal           Visit Vitals  /86   Pulse 93   Temp 36.4 °C (97.6 °F)       DASI Risk Score      Flowsheet Row Most Recent Value   DASI SCORE 58.2   METS Score (Will be calculated only when all the questions are answered) 9.9          Caprini DVT Assessment      Flowsheet Row Most Recent Value   DVT Score 15   Current Status Major surgery planned, lasting over 3 hours, Central venous access, Present cancer or chemotherapy   History Prior major surgery   Age 60-75 years   BMI 31-40 (Obesity)          Modified Frailty Index      Flowsheet Row Most Recent Value   Modified Frailty Index Calculator .1818          CHADS2 Stroke Risk  Current as of 6 hours ago        N/A 3 - 100%: High Risk   2 - 3%: Medium Risk   0 - 2%: Low Risk     Last Change: N/A          This score determines the patient's risk of having a stroke if the patient has atrial fibrillation.        This score is not applicable to this patient. Components are not calculated.          Revised Cardiac Risk Index      Flowsheet Row Most Recent Value   Revised Cardiac Risk Calculator 2          Apfel Simplified Score      Flowsheet Row Most Recent Value    Apfel Simplified Score Calculator 2          Risk Analysis Index Results This Encounter    No data found in the last 1 encounters.       Stop Bang Score      Flowsheet Row Most Recent Value   Do you snore loudly? 1   Do you often feel tired or fatigued after your sleep? 1   Has anyone ever observed you stop breathing in your sleep? 1   Do you have or are you being treated for high blood pressure? 1   Recent BMI (Calculated) 36.6   Is BMI greater than 35 kg/m2? 1=Yes   Age older than 50 years old? 1=Yes   Is your neck circumference greater than 17 inches (Male) or 16 inches (Female)? 1   Gender - Male 1=Yes   STOP-BANG Total Score 8            Assessment and Plan:   Neuro:  No neurologic diagnoses, however, the patient is at an increased risk for post operative delirium secondary to age >65 and type and duration of surgery.  Preoperative brain exercise educational handout provided to patient.    The patient is at an increased risk for perioperative stroke secondary to increased age, HTN, HLD, DM, general anesthesia and op time >2.5 hours.    HEENT/Airway:  No diagnosis or significant findings on chart review or clinical presentation and evaluation.     Cardiovascular:  HTN and HLD managed with diet and medications.  EKG in office today NSR. No additional preoperative testing is currently indicated.    METS are 9.9    RCRI  2 which is 10.1% 30 day risk of MACE (risk for cardiac death, nonfatal myocardial infarction, and nonfactal cardiac arrest    MARÍA score which indicates a 0.1% risk of intraoperative or 30-day postoperative MACE      Pulmonary:  Colon cancer with mets to the liver and lung scheduled for surgery.  Pneumonitis currently resolved possible decortication during surgery.  LEE compliant with CPAP-patient wears 3 L O2 bled in.  Preoperative deep breathing educational handout provided to patient.    ARISCAT:  58 points which is a high risk of in-hospital post-op pulmonary complications     PRODIGY:  21   points which is a high risk of post op opioid induced respiratory depression episodes    STOP BAN   points which is a high risk for moderate to severe LEE. Patient has known disease and uses CPAP with 3L O2 bled in- will bring machine on day of surgery for post op use.    Renal: BPH managing symptoms- not currently on medications.    The patient is at increased risk of perioperative renal complications secondary to age> 56, male sex, HTN and diabetes. Preventative measures include good preoperative BP and diabetes control.    Endocrine:  IDDM - last A1c on 23 was 8.8- repeat collected in [facility] today was 10- surgeon notified on 24 via secure chat.  Recommended postponing if possible but due to cancer diagnosis will proceed as planned.  Patient re-educated on diabetic diet and strict adherence in week before surgery.  Steroids with chemo last in 2023- consider preoperative stress dosing.    Hematologic:   anemia 2/2 chemo currently stable.  Preoperative DVT educational handout provided to patient.    Caprini Score:  15  points which is a highest risk of perioperative VTE    Gastrointestinal:   GERD managed with diet and medications. colon cancer with mets to the liver and lung s/p surgeries with Dr. Sotomayor and Dr. Preciado.    EAT-10 score of  0- self-perceived oropharyngeal dysphagia scale (0-40)     Apfel:  2  points 39% risk for post operative N/V    Infectious disease:  No diagnosis or significant findings on chart review or clinical presentation and evaluation.      Musculoskeletal:  OA managed with PRN pain relievers.         Labs ordered  Results for orders placed or performed in visit on 24 (from the past 96 hour(s))   CBC   Result Value Ref Range    WBC 7.8 4.4 - 11.3 x10*3/uL    nRBC 0.0 0.0 - 0.0 /100 WBCs    RBC 4.68 4.50 - 5.90 x10*6/uL    Hemoglobin 14.0 13.5 - 17.5 g/dL    Hematocrit 44.7 41.0 - 52.0 %    MCV 96 80 - 100 fL    MCH 29.9 26.0 - 34.0 pg    MCHC 31.3 (L) 32.0 -  36.0 g/dL    RDW 13.2 11.5 - 14.5 %    Platelets 272 150 - 450 x10*3/uL   Comprehensive Metabolic Panel   Result Value Ref Range    Glucose 281 (H) 74 - 99 mg/dL    Sodium 139 136 - 145 mmol/L    Potassium 4.3 3.5 - 5.3 mmol/L    Chloride 104 98 - 107 mmol/L    Bicarbonate 25 21 - 32 mmol/L    Anion Gap 14 10 - 20 mmol/L    Urea Nitrogen 17 6 - 23 mg/dL    Creatinine 1.01 0.50 - 1.30 mg/dL    eGFR 81 >60 mL/min/1.73m*2    Calcium 10.1 8.6 - 10.6 mg/dL    Albumin 4.4 3.4 - 5.0 g/dL    Alkaline Phosphatase 61 33 - 136 U/L    Total Protein 6.8 6.4 - 8.2 g/dL    AST 16 9 - 39 U/L    Bilirubin, Total 0.4 0.0 - 1.2 mg/dL    ALT 26 10 - 52 U/L   Protime-INR   Result Value Ref Range    Protime 10.5 9.8 - 12.8 seconds    INR 0.9 0.9 - 1.1   Type And Screen   Result Value Ref Range    ABO TYPE B     Rh TYPE NEG     ANTIBODY SCREEN NEG    Staphylococcus aureus/MRSA colonization, Culture    Specimen: Nares/Axilla/Groin; Swab   Result Value Ref Range    Staph/MRSA Screen Culture No Staphylococcus aureus isolated    Hemoglobin A1C   Result Value Ref Range    Hemoglobin A1C 10.0 (H) see below %    Estimated Average Glucose 240 Not Established mg/dL

## 2024-01-05 PROBLEM — C18.9 COLON CANCER METASTASIZED TO LUNG (MULTI): Status: ACTIVE | Noted: 2024-01-05

## 2024-01-05 PROBLEM — C78.00 COLON CANCER METASTASIZED TO LUNG (MULTI): Status: ACTIVE | Noted: 2024-01-05

## 2024-01-05 LAB
ATRIAL RATE: 89 BPM
P AXIS: 14 DEGREES
P OFFSET: 167 MS
P ONSET: 110 MS
PR INTERVAL: 202 MS
Q ONSET: 211 MS
QRS COUNT: 15 BEATS
QRS DURATION: 102 MS
QT INTERVAL: 340 MS
QTC CALCULATION(BAZETT): 413 MS
QTC FREDERICIA: 387 MS
R AXIS: -17 DEGREES
T AXIS: 79 DEGREES
T OFFSET: 381 MS
VENTRICULAR RATE: 89 BPM

## 2024-01-06 LAB — STAPHYLOCOCCUS SPEC CULT: NORMAL

## 2024-01-08 ENCOUNTER — ANESTHESIA EVENT (OUTPATIENT)
Dept: OPERATING ROOM | Facility: HOSPITAL | Age: 69
DRG: 164 | End: 2024-01-08
Payer: MEDICARE

## 2024-01-09 ENCOUNTER — ANESTHESIA (OUTPATIENT)
Dept: OPERATING ROOM | Facility: HOSPITAL | Age: 69
DRG: 164 | End: 2024-01-09
Payer: MEDICARE

## 2024-01-09 ENCOUNTER — APPOINTMENT (OUTPATIENT)
Dept: RADIOLOGY | Facility: HOSPITAL | Age: 69
DRG: 164 | End: 2024-01-09
Payer: MEDICARE

## 2024-01-09 ENCOUNTER — HOSPITAL ENCOUNTER (INPATIENT)
Facility: HOSPITAL | Age: 69
LOS: 1 days | Discharge: HOME | DRG: 164 | End: 2024-01-10
Attending: STUDENT IN AN ORGANIZED HEALTH CARE EDUCATION/TRAINING PROGRAM | Admitting: STUDENT IN AN ORGANIZED HEALTH CARE EDUCATION/TRAINING PROGRAM
Payer: MEDICARE

## 2024-01-09 ENCOUNTER — APPOINTMENT (OUTPATIENT)
Dept: CARDIOLOGY | Facility: HOSPITAL | Age: 69
DRG: 164 | End: 2024-01-09
Payer: MEDICARE

## 2024-01-09 DIAGNOSIS — C18.9 METASTATIC COLON CANCER TO LIVER (MULTI): ICD-10-CM

## 2024-01-09 DIAGNOSIS — C78.00 CARCINOMA OF COLON METASTATIC TO LUNG (MULTI): ICD-10-CM

## 2024-01-09 DIAGNOSIS — C78.00 COLON CANCER METASTASIZED TO LUNG (MULTI): Primary | ICD-10-CM

## 2024-01-09 DIAGNOSIS — C18.9 COLON CANCER METASTASIZED TO LUNG (MULTI): Primary | ICD-10-CM

## 2024-01-09 DIAGNOSIS — C18.9 CARCINOMA OF COLON METASTATIC TO LUNG (MULTI): ICD-10-CM

## 2024-01-09 DIAGNOSIS — C78.7 METASTATIC COLON CANCER TO LIVER (MULTI): ICD-10-CM

## 2024-01-09 DIAGNOSIS — C18.9 MALIGNANT NEOPLASM OF COLON, UNSPECIFIED PART OF COLON (MULTI): ICD-10-CM

## 2024-01-09 PROBLEM — R07.89 ANTERIOR CHEST WALL PAIN: Status: RESOLVED | Noted: 2023-09-13 | Resolved: 2024-01-09

## 2024-01-09 LAB
ABO GROUP (TYPE) IN BLOOD: NORMAL
ANION GAP BLDA CALCULATED.4IONS-SCNC: 9 MMO/L (ref 10–25)
BASE EXCESS BLDA CALC-SCNC: -0.5 MMOL/L (ref -2–3)
BODY TEMPERATURE: 37 DEGREES CELSIUS
CA-I BLDA-SCNC: 1.25 MMOL/L (ref 1.1–1.33)
CHLORIDE BLDA-SCNC: 107 MMOL/L (ref 98–107)
GLUCOSE BLD MANUAL STRIP-MCNC: 147 MG/DL (ref 74–99)
GLUCOSE BLD MANUAL STRIP-MCNC: 171 MG/DL (ref 74–99)
GLUCOSE BLD MANUAL STRIP-MCNC: 207 MG/DL (ref 74–99)
GLUCOSE BLD MANUAL STRIP-MCNC: 298 MG/DL (ref 74–99)
GLUCOSE BLDA-MCNC: 133 MG/DL (ref 74–99)
HCO3 BLDA-SCNC: 23.1 MMOL/L (ref 22–26)
HCT VFR BLD EST: 40 % (ref 41–52)
HGB BLDA-MCNC: 13.3 G/DL (ref 13.5–17.5)
INHALED O2 CONCENTRATION: 100 %
LACTATE BLDA-SCNC: 1.1 MMOL/L (ref 0.4–2)
OXYHGB MFR BLDA: 97.8 % (ref 94–98)
PCO2 BLDA: 34 MM HG (ref 38–42)
PH BLDA: 7.44 PH (ref 7.38–7.42)
PO2 BLDA: 168 MM HG (ref 85–95)
POTASSIUM BLDA-SCNC: 4.5 MMOL/L (ref 3.5–5.3)
RH FACTOR (ANTIGEN D): NORMAL
SAO2 % BLDA: 100 % (ref 94–100)
SODIUM BLDA-SCNC: 135 MMOL/L (ref 136–145)

## 2024-01-09 PROCEDURE — 3600000009 HC OR TIME - EACH INCREMENTAL 1 MINUTE - PROCEDURE LEVEL FOUR: Performed by: STUDENT IN AN ORGANIZED HEALTH CARE EDUCATION/TRAINING PROGRAM

## 2024-01-09 PROCEDURE — A32666 PR THORACOSCOPY W/THERA WEDGE RESEXN INITIAL UNILAT: Performed by: NURSE ANESTHETIST, CERTIFIED REGISTERED

## 2024-01-09 PROCEDURE — 84132 ASSAY OF SERUM POTASSIUM: CPT

## 2024-01-09 PROCEDURE — 2720000007 HC OR 272 NO HCPCS: Performed by: STUDENT IN AN ORGANIZED HEALTH CARE EDUCATION/TRAINING PROGRAM

## 2024-01-09 PROCEDURE — 7100000001 HC RECOVERY ROOM TIME - INITIAL BASE CHARGE: Performed by: STUDENT IN AN ORGANIZED HEALTH CARE EDUCATION/TRAINING PROGRAM

## 2024-01-09 PROCEDURE — 36620 INSERTION CATHETER ARTERY: CPT | Performed by: STUDENT IN AN ORGANIZED HEALTH CARE EDUCATION/TRAINING PROGRAM

## 2024-01-09 PROCEDURE — 0BJ08ZZ INSPECTION OF TRACHEOBRONCHIAL TREE, VIA NATURAL OR ARTIFICIAL OPENING ENDOSCOPIC: ICD-10-PCS | Performed by: STUDENT IN AN ORGANIZED HEALTH CARE EDUCATION/TRAINING PROGRAM

## 2024-01-09 PROCEDURE — 2500000004 HC RX 250 GENERAL PHARMACY W/ HCPCS (ALT 636 FOR OP/ED): Performed by: STUDENT IN AN ORGANIZED HEALTH CARE EDUCATION/TRAINING PROGRAM

## 2024-01-09 PROCEDURE — 2500000001 HC RX 250 WO HCPCS SELF ADMINISTERED DRUGS (ALT 637 FOR MEDICARE OP)

## 2024-01-09 PROCEDURE — 88331 PATH CONSLTJ SURG 1 BLK 1SPC: CPT | Mod: TC,SUR | Performed by: STUDENT IN AN ORGANIZED HEALTH CARE EDUCATION/TRAINING PROGRAM

## 2024-01-09 PROCEDURE — 93005 ELECTROCARDIOGRAM TRACING: CPT

## 2024-01-09 PROCEDURE — 3600000004 HC OR TIME - INITIAL BASE CHARGE - PROCEDURE LEVEL FOUR: Performed by: STUDENT IN AN ORGANIZED HEALTH CARE EDUCATION/TRAINING PROGRAM

## 2024-01-09 PROCEDURE — 2500000004 HC RX 250 GENERAL PHARMACY W/ HCPCS (ALT 636 FOR OP/ED)

## 2024-01-09 PROCEDURE — 2500000005 HC RX 250 GENERAL PHARMACY W/O HCPCS

## 2024-01-09 PROCEDURE — 3700000001 HC GENERAL ANESTHESIA TIME - INITIAL BASE CHARGE: Performed by: STUDENT IN AN ORGANIZED HEALTH CARE EDUCATION/TRAINING PROGRAM

## 2024-01-09 PROCEDURE — 71045 X-RAY EXAM CHEST 1 VIEW: CPT | Mod: FOREIGN READ | Performed by: RADIOLOGY

## 2024-01-09 PROCEDURE — A32666 PR THORACOSCOPY W/THERA WEDGE RESEXN INITIAL UNILAT: Performed by: STUDENT IN AN ORGANIZED HEALTH CARE EDUCATION/TRAINING PROGRAM

## 2024-01-09 PROCEDURE — G0378 HOSPITAL OBSERVATION PER HR: HCPCS

## 2024-01-09 PROCEDURE — 82947 ASSAY GLUCOSE BLOOD QUANT: CPT

## 2024-01-09 PROCEDURE — 2500000002 HC RX 250 W HCPCS SELF ADMINISTERED DRUGS (ALT 637 FOR MEDICARE OP, ALT 636 FOR OP/ED)

## 2024-01-09 PROCEDURE — 2780000003 HC OR 278 NO HCPCS: Performed by: STUDENT IN AN ORGANIZED HEALTH CARE EDUCATION/TRAINING PROGRAM

## 2024-01-09 PROCEDURE — 0BBJ4ZZ EXCISION OF LEFT LOWER LUNG LOBE, PERCUTANEOUS ENDOSCOPIC APPROACH: ICD-10-PCS | Performed by: STUDENT IN AN ORGANIZED HEALTH CARE EDUCATION/TRAINING PROGRAM

## 2024-01-09 PROCEDURE — 31622 DX BRONCHOSCOPE/WASH: CPT | Performed by: STUDENT IN AN ORGANIZED HEALTH CARE EDUCATION/TRAINING PROGRAM

## 2024-01-09 PROCEDURE — 32666 THORACOSCOPY W/WEDGE RESECT: CPT | Performed by: STUDENT IN AN ORGANIZED HEALTH CARE EDUCATION/TRAINING PROGRAM

## 2024-01-09 PROCEDURE — 2500000005 HC RX 250 GENERAL PHARMACY W/O HCPCS: Performed by: STUDENT IN AN ORGANIZED HEALTH CARE EDUCATION/TRAINING PROGRAM

## 2024-01-09 PROCEDURE — 7100000002 HC RECOVERY ROOM TIME - EACH INCREMENTAL 1 MINUTE: Performed by: STUDENT IN AN ORGANIZED HEALTH CARE EDUCATION/TRAINING PROGRAM

## 2024-01-09 PROCEDURE — 76937 US GUIDE VASCULAR ACCESS: CPT | Performed by: STUDENT IN AN ORGANIZED HEALTH CARE EDUCATION/TRAINING PROGRAM

## 2024-01-09 PROCEDURE — 88307 TISSUE EXAM BY PATHOLOGIST: CPT | Performed by: STUDENT IN AN ORGANIZED HEALTH CARE EDUCATION/TRAINING PROGRAM

## 2024-01-09 PROCEDURE — 88307 TISSUE EXAM BY PATHOLOGIST: CPT | Mod: TC,SUR | Performed by: STUDENT IN AN ORGANIZED HEALTH CARE EDUCATION/TRAINING PROGRAM

## 2024-01-09 PROCEDURE — 3700000002 HC GENERAL ANESTHESIA TIME - EACH INCREMENTAL 1 MINUTE: Performed by: STUDENT IN AN ORGANIZED HEALTH CARE EDUCATION/TRAINING PROGRAM

## 2024-01-09 PROCEDURE — 71045 X-RAY EXAM CHEST 1 VIEW: CPT | Mod: FR

## 2024-01-09 RX ORDER — CEFAZOLIN SODIUM 2 G/100ML
2 INJECTION, SOLUTION INTRAVENOUS ONCE
Status: DISCONTINUED | OUTPATIENT
Start: 2024-01-09 | End: 2024-01-09 | Stop reason: HOSPADM

## 2024-01-09 RX ORDER — SODIUM CHLORIDE, SODIUM LACTATE, POTASSIUM CHLORIDE, CALCIUM CHLORIDE 600; 310; 30; 20 MG/100ML; MG/100ML; MG/100ML; MG/100ML
INJECTION, SOLUTION INTRAVENOUS
Status: COMPLETED
Start: 2024-01-09 | End: 2024-01-09

## 2024-01-09 RX ORDER — ROCURONIUM BROMIDE 10 MG/ML
INJECTION, SOLUTION INTRAVENOUS
Status: DISCONTINUED
Start: 2024-01-09 | End: 2024-01-10 | Stop reason: HOSPADM

## 2024-01-09 RX ORDER — HYDROMORPHONE HYDROCHLORIDE 1 MG/ML
INJECTION, SOLUTION INTRAMUSCULAR; INTRAVENOUS; SUBCUTANEOUS
Status: COMPLETED
Start: 2024-01-09 | End: 2024-01-09

## 2024-01-09 RX ORDER — MIDAZOLAM HYDROCHLORIDE 1 MG/ML
INJECTION, SOLUTION INTRAMUSCULAR; INTRAVENOUS AS NEEDED
Status: DISCONTINUED | OUTPATIENT
Start: 2024-01-09 | End: 2024-01-09

## 2024-01-09 RX ORDER — SODIUM CHLORIDE, SODIUM LACTATE, POTASSIUM CHLORIDE, CALCIUM CHLORIDE 600; 310; 30; 20 MG/100ML; MG/100ML; MG/100ML; MG/100ML
100 INJECTION, SOLUTION INTRAVENOUS CONTINUOUS
Status: DISCONTINUED | OUTPATIENT
Start: 2024-01-09 | End: 2024-01-09 | Stop reason: HOSPADM

## 2024-01-09 RX ORDER — HEPARIN SODIUM 5000 [USP'U]/ML
5000 INJECTION, SOLUTION INTRAVENOUS; SUBCUTANEOUS ONCE
Status: COMPLETED | OUTPATIENT
Start: 2024-01-09 | End: 2024-01-09

## 2024-01-09 RX ORDER — FENTANYL CITRATE 50 UG/ML
INJECTION, SOLUTION INTRAMUSCULAR; INTRAVENOUS
Status: COMPLETED
Start: 2024-01-09 | End: 2024-01-09

## 2024-01-09 RX ORDER — HYDROMORPHONE HCL/0.9% NACL/PF 15 MG/30ML
PATIENT CONTROLLED ANALGESIA SYRINGE INTRAVENOUS CONTINUOUS
Status: DISCONTINUED | OUTPATIENT
Start: 2024-01-09 | End: 2024-01-10

## 2024-01-09 RX ORDER — FENTANYL CITRATE 50 UG/ML
INJECTION, SOLUTION INTRAMUSCULAR; INTRAVENOUS AS NEEDED
Status: DISCONTINUED | OUTPATIENT
Start: 2024-01-09 | End: 2024-01-09

## 2024-01-09 RX ORDER — PHENYLEPHRINE HCL IN 0.9% NACL 0.4MG/10ML
SYRINGE (ML) INTRAVENOUS AS NEEDED
Status: DISCONTINUED | OUTPATIENT
Start: 2024-01-09 | End: 2024-01-09

## 2024-01-09 RX ORDER — DEXTROSE MONOHYDRATE 100 MG/ML
0.3 INJECTION, SOLUTION INTRAVENOUS ONCE AS NEEDED
Status: DISCONTINUED | OUTPATIENT
Start: 2024-01-09 | End: 2024-01-10 | Stop reason: HOSPADM

## 2024-01-09 RX ORDER — NALOXONE HYDROCHLORIDE 0.4 MG/ML
0.2 INJECTION, SOLUTION INTRAMUSCULAR; INTRAVENOUS; SUBCUTANEOUS AS NEEDED
Status: DISCONTINUED | OUTPATIENT
Start: 2024-01-09 | End: 2024-01-10 | Stop reason: HOSPADM

## 2024-01-09 RX ORDER — PROPOFOL 10 MG/ML
INJECTION, EMULSION INTRAVENOUS AS NEEDED
Status: DISCONTINUED | OUTPATIENT
Start: 2024-01-09 | End: 2024-01-09

## 2024-01-09 RX ORDER — HYDROMORPHONE HYDROCHLORIDE 1 MG/ML
0.5 INJECTION, SOLUTION INTRAMUSCULAR; INTRAVENOUS; SUBCUTANEOUS EVERY 5 MIN PRN
Status: DISCONTINUED | OUTPATIENT
Start: 2024-01-09 | End: 2024-01-09 | Stop reason: HOSPADM

## 2024-01-09 RX ORDER — ONDANSETRON HYDROCHLORIDE 2 MG/ML
INJECTION, SOLUTION INTRAVENOUS AS NEEDED
Status: DISCONTINUED | OUTPATIENT
Start: 2024-01-09 | End: 2024-01-09

## 2024-01-09 RX ORDER — ROCURONIUM BROMIDE 10 MG/ML
INJECTION, SOLUTION INTRAVENOUS
Status: COMPLETED
Start: 2024-01-09 | End: 2024-01-09

## 2024-01-09 RX ORDER — IPRATROPIUM BROMIDE AND ALBUTEROL SULFATE 2.5; .5 MG/3ML; MG/3ML
3 SOLUTION RESPIRATORY (INHALATION) EVERY 6 HOURS PRN
Status: DISCONTINUED | OUTPATIENT
Start: 2024-01-09 | End: 2024-01-10 | Stop reason: HOSPADM

## 2024-01-09 RX ORDER — LABETALOL HYDROCHLORIDE 5 MG/ML
INJECTION, SOLUTION INTRAVENOUS
Status: DISCONTINUED
Start: 2024-01-09 | End: 2024-01-10 | Stop reason: HOSPADM

## 2024-01-09 RX ORDER — ONDANSETRON HYDROCHLORIDE 2 MG/ML
4 INJECTION, SOLUTION INTRAVENOUS ONCE AS NEEDED
Status: DISCONTINUED | OUTPATIENT
Start: 2024-01-09 | End: 2024-01-09 | Stop reason: HOSPADM

## 2024-01-09 RX ORDER — ACETAMINOPHEN 325 MG/1
650 TABLET ORAL EVERY 4 HOURS PRN
Status: DISCONTINUED | OUTPATIENT
Start: 2024-01-09 | End: 2024-01-10

## 2024-01-09 RX ORDER — PHENYLEPHRINE 10 MG/250 ML(40 MCG/ML)IN 0.9 % SOD.CHLORIDE INTRAVENOUS
CONTINUOUS PRN
Status: DISCONTINUED | OUTPATIENT
Start: 2024-01-09 | End: 2024-01-09

## 2024-01-09 RX ORDER — INSULIN GLARGINE 100 [IU]/ML
20 INJECTION, SOLUTION SUBCUTANEOUS NIGHTLY
Status: DISCONTINUED | OUTPATIENT
Start: 2024-01-09 | End: 2024-01-10 | Stop reason: HOSPADM

## 2024-01-09 RX ORDER — DEXAMETHASONE SODIUM PHOSPHATE 4 MG/ML
INJECTION, SOLUTION INTRA-ARTICULAR; INTRALESIONAL; INTRAMUSCULAR; INTRAVENOUS; SOFT TISSUE
Status: COMPLETED
Start: 2024-01-09 | End: 2024-01-09

## 2024-01-09 RX ORDER — AMOXICILLIN 250 MG
2 CAPSULE ORAL 2 TIMES DAILY
Status: DISCONTINUED | OUTPATIENT
Start: 2024-01-09 | End: 2024-01-10 | Stop reason: HOSPADM

## 2024-01-09 RX ORDER — MIDAZOLAM HYDROCHLORIDE 1 MG/ML
INJECTION INTRAMUSCULAR; INTRAVENOUS
Status: COMPLETED
Start: 2024-01-09 | End: 2024-01-09

## 2024-01-09 RX ORDER — ONDANSETRON HYDROCHLORIDE 2 MG/ML
4 INJECTION, SOLUTION INTRAVENOUS EVERY 8 HOURS PRN
Status: DISCONTINUED | OUTPATIENT
Start: 2024-01-09 | End: 2024-01-10 | Stop reason: HOSPADM

## 2024-01-09 RX ORDER — ONDANSETRON HYDROCHLORIDE 2 MG/ML
INJECTION, SOLUTION INTRAVENOUS
Status: COMPLETED
Start: 2024-01-09 | End: 2024-01-09

## 2024-01-09 RX ORDER — PROPOFOL 10 MG/ML
INJECTION, EMULSION INTRAVENOUS
Status: COMPLETED
Start: 2024-01-09 | End: 2024-01-09

## 2024-01-09 RX ORDER — LABETALOL HYDROCHLORIDE 5 MG/ML
INJECTION, SOLUTION INTRAVENOUS AS NEEDED
Status: DISCONTINUED | OUTPATIENT
Start: 2024-01-09 | End: 2024-01-09

## 2024-01-09 RX ORDER — CEFAZOLIN SODIUM 2 G/100ML
2 INJECTION, SOLUTION INTRAVENOUS EVERY 8 HOURS
Status: COMPLETED | OUTPATIENT
Start: 2024-01-09 | End: 2024-01-10

## 2024-01-09 RX ORDER — LIDOCAINE HYDROCHLORIDE 10 MG/ML
0.1 INJECTION INFILTRATION; PERINEURAL ONCE
Status: DISCONTINUED | OUTPATIENT
Start: 2024-01-09 | End: 2024-01-09 | Stop reason: HOSPADM

## 2024-01-09 RX ORDER — CEFAZOLIN 1 G/1
INJECTION, POWDER, FOR SOLUTION INTRAVENOUS
Status: DISCONTINUED
Start: 2024-01-09 | End: 2024-01-10 | Stop reason: HOSPADM

## 2024-01-09 RX ORDER — ROCURONIUM BROMIDE 10 MG/ML
INJECTION, SOLUTION INTRAVENOUS AS NEEDED
Status: DISCONTINUED | OUTPATIENT
Start: 2024-01-09 | End: 2024-01-09

## 2024-01-09 RX ORDER — CEFAZOLIN 1 G/1
INJECTION, POWDER, FOR SOLUTION INTRAVENOUS AS NEEDED
Status: DISCONTINUED | OUTPATIENT
Start: 2024-01-09 | End: 2024-01-09

## 2024-01-09 RX ORDER — SEVOFLURANE 250 ML/250ML
LIQUID RESPIRATORY (INHALATION)
Status: DISCONTINUED
Start: 2024-01-09 | End: 2024-01-10 | Stop reason: HOSPADM

## 2024-01-09 RX ORDER — HYDROMORPHONE HYDROCHLORIDE 1 MG/ML
INJECTION, SOLUTION INTRAMUSCULAR; INTRAVENOUS; SUBCUTANEOUS AS NEEDED
Status: DISCONTINUED | OUTPATIENT
Start: 2024-01-09 | End: 2024-01-09

## 2024-01-09 RX ORDER — LIDOCAINE HYDROCHLORIDE 20 MG/ML
INJECTION, SOLUTION INFILTRATION; PERINEURAL AS NEEDED
Status: DISCONTINUED | OUTPATIENT
Start: 2024-01-09 | End: 2024-01-09

## 2024-01-09 RX ORDER — INSULIN LISPRO 100 [IU]/ML
0-10 INJECTION, SOLUTION INTRAVENOUS; SUBCUTANEOUS
Status: DISCONTINUED | OUTPATIENT
Start: 2024-01-09 | End: 2024-01-10 | Stop reason: HOSPADM

## 2024-01-09 RX ORDER — MIDAZOLAM HYDROCHLORIDE 1 MG/ML
INJECTION INTRAMUSCULAR; INTRAVENOUS AS NEEDED
Status: DISCONTINUED | OUTPATIENT
Start: 2024-01-09 | End: 2024-01-09

## 2024-01-09 RX ORDER — BUPIVACAINE HCL/EPINEPHRINE 0.25-.0005
VIAL (ML) INJECTION AS NEEDED
Status: DISCONTINUED | OUTPATIENT
Start: 2024-01-09 | End: 2024-01-09 | Stop reason: HOSPADM

## 2024-01-09 RX ORDER — DEXAMETHASONE SODIUM PHOSPHATE 4 MG/ML
INJECTION, SOLUTION INTRA-ARTICULAR; INTRALESIONAL; INTRAMUSCULAR; INTRAVENOUS; SOFT TISSUE AS NEEDED
Status: DISCONTINUED | OUTPATIENT
Start: 2024-01-09 | End: 2024-01-09

## 2024-01-09 RX ORDER — DEXTROSE 50 % IN WATER (D50W) INTRAVENOUS SYRINGE
25
Status: DISCONTINUED | OUTPATIENT
Start: 2024-01-09 | End: 2024-01-10 | Stop reason: HOSPADM

## 2024-01-09 RX ORDER — CEFAZOLIN 1 G/1
INJECTION, POWDER, FOR SOLUTION INTRAVENOUS
Status: COMPLETED
Start: 2024-01-09 | End: 2024-01-09

## 2024-01-09 RX ORDER — HYDROMORPHONE HYDROCHLORIDE 1 MG/ML
0.2 INJECTION, SOLUTION INTRAMUSCULAR; INTRAVENOUS; SUBCUTANEOUS EVERY 5 MIN PRN
Status: DISCONTINUED | OUTPATIENT
Start: 2024-01-09 | End: 2024-01-09 | Stop reason: HOSPADM

## 2024-01-09 RX ORDER — HEPARIN SODIUM 5000 [USP'U]/ML
5000 INJECTION, SOLUTION INTRAVENOUS; SUBCUTANEOUS EVERY 8 HOURS
Status: DISCONTINUED | OUTPATIENT
Start: 2024-01-09 | End: 2024-01-10 | Stop reason: HOSPADM

## 2024-01-09 RX ORDER — SODIUM CHLORIDE, SODIUM LACTATE, POTASSIUM CHLORIDE, CALCIUM CHLORIDE 600; 310; 30; 20 MG/100ML; MG/100ML; MG/100ML; MG/100ML
100 INJECTION, SOLUTION INTRAVENOUS CONTINUOUS
Status: DISCONTINUED | OUTPATIENT
Start: 2024-01-09 | End: 2024-01-09

## 2024-01-09 RX ORDER — SODIUM CHLORIDE, SODIUM LACTATE, POTASSIUM CHLORIDE, CALCIUM CHLORIDE 600; 310; 30; 20 MG/100ML; MG/100ML; MG/100ML; MG/100ML
INJECTION, SOLUTION INTRAVENOUS CONTINUOUS PRN
Status: DISCONTINUED | OUTPATIENT
Start: 2024-01-09 | End: 2024-01-09

## 2024-01-09 RX ORDER — LOSARTAN POTASSIUM 25 MG/1
100 TABLET ORAL DAILY
Status: DISCONTINUED | OUTPATIENT
Start: 2024-01-10 | End: 2024-01-10 | Stop reason: HOSPADM

## 2024-01-09 RX ORDER — LIDOCAINE HCL/PF 100 MG/5ML
SYRINGE (ML) INTRAVENOUS
Status: DISCONTINUED
Start: 2024-01-09 | End: 2024-01-10 | Stop reason: HOSPADM

## 2024-01-09 RX ADMIN — Medication 80 MCG: at 12:52

## 2024-01-09 RX ADMIN — SUGAMMADEX 200 MG: 100 INJECTION, SOLUTION INTRAVENOUS at 13:41

## 2024-01-09 RX ADMIN — HEPARIN SODIUM 5000 UNITS: 5000 INJECTION INTRAVENOUS; SUBCUTANEOUS at 18:05

## 2024-01-09 RX ADMIN — DEXAMETHASONE SODIUM PHOSPHATE 10 MG: 4 INJECTION, SOLUTION INTRA-ARTICULAR; INTRALESIONAL; INTRAMUSCULAR; INTRAVENOUS; SOFT TISSUE at 12:30

## 2024-01-09 RX ADMIN — SODIUM CHLORIDE, POTASSIUM CHLORIDE, SODIUM LACTATE AND CALCIUM CHLORIDE: 600; 310; 30; 20 INJECTION, SOLUTION INTRAVENOUS at 12:36

## 2024-01-09 RX ADMIN — HYDROMORPHONE HYDROCHLORIDE 0.5 MG: 1 INJECTION, SOLUTION INTRAMUSCULAR; INTRAVENOUS; SUBCUTANEOUS at 14:52

## 2024-01-09 RX ADMIN — Medication 120 MCG: at 12:20

## 2024-01-09 RX ADMIN — PROPOFOL 200 MG: 10 INJECTION, EMULSION INTRAVENOUS at 12:05

## 2024-01-09 RX ADMIN — Medication 120 MCG: at 12:48

## 2024-01-09 RX ADMIN — Medication: at 15:05

## 2024-01-09 RX ADMIN — Medication 0.2 MCG/KG/MIN: at 12:41

## 2024-01-09 RX ADMIN — ONDANSETRON 4 MG: 2 INJECTION INTRAMUSCULAR; INTRAVENOUS at 13:16

## 2024-01-09 RX ADMIN — HYDROMORPHONE HYDROCHLORIDE 0.4 MG: 1 INJECTION, SOLUTION INTRAMUSCULAR; INTRAVENOUS; SUBCUTANEOUS at 13:51

## 2024-01-09 RX ADMIN — ROCURONIUM BROMIDE 80 MG: 10 INJECTION INTRAVENOUS at 12:06

## 2024-01-09 RX ADMIN — SODIUM CHLORIDE, POTASSIUM CHLORIDE, SODIUM LACTATE AND CALCIUM CHLORIDE: 600; 310; 30; 20 INJECTION, SOLUTION INTRAVENOUS at 11:40

## 2024-01-09 RX ADMIN — Medication 80 MCG: at 12:08

## 2024-01-09 RX ADMIN — INSULIN GLARGINE 20 UNITS: 100 INJECTION, SOLUTION SUBCUTANEOUS at 21:28

## 2024-01-09 RX ADMIN — HYDROMORPHONE HYDROCHLORIDE 0.2 MG: 1 INJECTION, SOLUTION INTRAMUSCULAR; INTRAVENOUS; SUBCUTANEOUS at 13:35

## 2024-01-09 RX ADMIN — MIDAZOLAM 2 MG: 1 INJECTION INTRAMUSCULAR; INTRAVENOUS at 11:40

## 2024-01-09 RX ADMIN — HYDROMORPHONE HYDROCHLORIDE 0.4 MG: 1 INJECTION, SOLUTION INTRAMUSCULAR; INTRAVENOUS; SUBCUTANEOUS at 13:58

## 2024-01-09 RX ADMIN — CEFAZOLIN SODIUM 2 G: 2 INJECTION, SOLUTION INTRAVENOUS at 21:14

## 2024-01-09 RX ADMIN — INSULIN LISPRO 4 UNITS: 100 INJECTION, SOLUTION INTRAVENOUS; SUBCUTANEOUS at 18:16

## 2024-01-09 RX ADMIN — SODIUM CHLORIDE, POTASSIUM CHLORIDE, SODIUM LACTATE AND CALCIUM CHLORIDE 100 ML/HR: 600; 310; 30; 20 INJECTION, SOLUTION INTRAVENOUS at 14:34

## 2024-01-09 RX ADMIN — LIDOCAINE HYDROCHLORIDE 100 MG: 20 INJECTION, SOLUTION INFILTRATION; PERINEURAL at 12:05

## 2024-01-09 RX ADMIN — LABETALOL HYDROCHLORIDE 5 MG: 5 INJECTION, SOLUTION INTRAVENOUS at 13:46

## 2024-01-09 RX ADMIN — HEPARIN SODIUM 5000 UNITS: 5000 INJECTION INTRAVENOUS; SUBCUTANEOUS at 09:47

## 2024-01-09 RX ADMIN — FENTANYL CITRATE 100 MCG: 50 INJECTION, SOLUTION INTRAMUSCULAR; INTRAVENOUS at 12:05

## 2024-01-09 RX ADMIN — CEFAZOLIN 2 G: 1 INJECTION, POWDER, FOR SOLUTION INTRAMUSCULAR; INTRAVENOUS at 12:08

## 2024-01-09 RX ADMIN — HYDROMORPHONE HYDROCHLORIDE 0.5 MG: 1 INJECTION, SOLUTION INTRAMUSCULAR; INTRAVENOUS; SUBCUTANEOUS at 14:07

## 2024-01-09 SDOH — SOCIAL STABILITY: SOCIAL INSECURITY: ABUSE: ADULT

## 2024-01-09 SDOH — SOCIAL STABILITY: SOCIAL INSECURITY: DOES ANYONE TRY TO KEEP YOU FROM HAVING/CONTACTING OTHER FRIENDS OR DOING THINGS OUTSIDE YOUR HOME?: NO

## 2024-01-09 SDOH — SOCIAL STABILITY: SOCIAL INSECURITY: DO YOU FEEL ANYONE HAS EXPLOITED OR TAKEN ADVANTAGE OF YOU FINANCIALLY OR OF YOUR PERSONAL PROPERTY?: NO

## 2024-01-09 SDOH — SOCIAL STABILITY: SOCIAL INSECURITY: HAVE YOU HAD THOUGHTS OF HARMING ANYONE ELSE?: NO

## 2024-01-09 SDOH — SOCIAL STABILITY: SOCIAL INSECURITY: DO YOU FEEL UNSAFE GOING BACK TO THE PLACE WHERE YOU ARE LIVING?: NO

## 2024-01-09 SDOH — SOCIAL STABILITY: SOCIAL INSECURITY: WERE YOU ABLE TO COMPLETE ALL THE BEHAVIORAL HEALTH SCREENINGS?: YES

## 2024-01-09 SDOH — SOCIAL STABILITY: SOCIAL INSECURITY: HAS ANYONE EVER THREATENED TO HURT YOUR FAMILY OR YOUR PETS?: NO

## 2024-01-09 SDOH — HEALTH STABILITY: MENTAL HEALTH: CURRENT SMOKER: 0

## 2024-01-09 SDOH — SOCIAL STABILITY: SOCIAL INSECURITY: ARE THERE ANY APPARENT SIGNS OF INJURIES/BEHAVIORS THAT COULD BE RELATED TO ABUSE/NEGLECT?: NO

## 2024-01-09 SDOH — SOCIAL STABILITY: SOCIAL INSECURITY: ARE YOU OR HAVE YOU BEEN THREATENED OR ABUSED PHYSICALLY, EMOTIONALLY, OR SEXUALLY BY ANYONE?: NO

## 2024-01-09 ASSESSMENT — LIFESTYLE VARIABLES
HOW MANY STANDARD DRINKS CONTAINING ALCOHOL DO YOU HAVE ON A TYPICAL DAY: PATIENT DOES NOT DRINK
HOW OFTEN DO YOU HAVE 6 OR MORE DRINKS ON ONE OCCASION: NEVER
HOW OFTEN DO YOU HAVE A DRINK CONTAINING ALCOHOL: NEVER
SUBSTANCE_ABUSE_PAST_12_MONTHS: NO
SKIP TO QUESTIONS 9-10: 1
PRESCIPTION_ABUSE_PAST_12_MONTHS: NO
AUDIT-C TOTAL SCORE: 0
AUDIT-C TOTAL SCORE: 0

## 2024-01-09 ASSESSMENT — ACTIVITIES OF DAILY LIVING (ADL)
ADEQUATE_TO_COMPLETE_ADL: YES
HEARING - RIGHT EAR: FUNCTIONAL
HEARING - LEFT EAR: FUNCTIONAL
WALKS IN HOME: INDEPENDENT
FEEDING YOURSELF: INDEPENDENT
DRESSING YOURSELF: INDEPENDENT
LACK_OF_TRANSPORTATION: PATIENT DECLINED
PATIENT'S MEMORY ADEQUATE TO SAFELY COMPLETE DAILY ACTIVITIES?: YES
JUDGMENT_ADEQUATE_SAFELY_COMPLETE_DAILY_ACTIVITIES: YES
GROOMING: INDEPENDENT
BATHING: INDEPENDENT
TOILETING: INDEPENDENT

## 2024-01-09 ASSESSMENT — COGNITIVE AND FUNCTIONAL STATUS - GENERAL
MOVING TO AND FROM BED TO CHAIR: A LITTLE
MOBILITY SCORE: 20
DAILY ACTIVITIY SCORE: 24
CLIMB 3 TO 5 STEPS WITH RAILING: A LITTLE
PATIENT BASELINE BEDBOUND: NO
CLIMB 3 TO 5 STEPS WITH RAILING: A LITTLE
WALKING IN HOSPITAL ROOM: A LITTLE
MOBILITY SCORE: 24
STANDING UP FROM CHAIR USING ARMS: A LITTLE
DAILY ACTIVITIY SCORE: 24

## 2024-01-09 ASSESSMENT — COLUMBIA-SUICIDE SEVERITY RATING SCALE - C-SSRS
6. HAVE YOU EVER DONE ANYTHING, STARTED TO DO ANYTHING, OR PREPARED TO DO ANYTHING TO END YOUR LIFE?: NO
2. HAVE YOU ACTUALLY HAD ANY THOUGHTS OF KILLING YOURSELF?: NO
1. IN THE PAST MONTH, HAVE YOU WISHED YOU WERE DEAD OR WISHED YOU COULD GO TO SLEEP AND NOT WAKE UP?: NO

## 2024-01-09 ASSESSMENT — PAIN SCALES - GENERAL
PAINLEVEL_OUTOF10: 9
PAINLEVEL_OUTOF10: 7
PAINLEVEL_OUTOF10: 6
PAINLEVEL_OUTOF10: 7
PAINLEVEL_OUTOF10: 6
PAINLEVEL_OUTOF10: 3
PAINLEVEL_OUTOF10: 0 - NO PAIN
PAINLEVEL_OUTOF10: 7
PAINLEVEL_OUTOF10: 7
PAINLEVEL_OUTOF10: 6

## 2024-01-09 ASSESSMENT — PATIENT HEALTH QUESTIONNAIRE - PHQ9
2. FEELING DOWN, DEPRESSED OR HOPELESS: NOT AT ALL
1. LITTLE INTEREST OR PLEASURE IN DOING THINGS: NOT AT ALL
SUM OF ALL RESPONSES TO PHQ9 QUESTIONS 1 & 2: 0

## 2024-01-09 ASSESSMENT — PAIN - FUNCTIONAL ASSESSMENT
PAIN_FUNCTIONAL_ASSESSMENT: 0-10
PAIN_FUNCTIONAL_ASSESSMENT: WONG-BAKER FACES

## 2024-01-09 ASSESSMENT — PAIN DESCRIPTION - LOCATION: LOCATION: CHEST

## 2024-01-09 ASSESSMENT — PAIN DESCRIPTION - DESCRIPTORS: DESCRIPTORS: SHARP;SHOOTING;STABBING

## 2024-01-09 NOTE — ANESTHESIA PROCEDURE NOTES
Arterial Line:    Date/Time: 1/9/2024 12:18 PM    Staffing  Performed: HADLEY   Authorized by: Kath Mclaughlin MD    Performed by: Lina Wells    An arterial line was placed. Procedure performed using surface landmarks.in the OB for the following indication(s): continuous blood pressure monitoring.    A 20 gauge (size) (length), Angiocath (type) catheter was placed into the Right radial artery, secured by tape

## 2024-01-09 NOTE — ANESTHESIA PROCEDURE NOTES
Airway  Date/Time: 1/9/2024 12:09 PM  Urgency: elective    Airway not difficult    Staffing  Performed: CRNA   Authorized by: Kath Mclaughlin MD    Performed by: Lina Wells  Patient location during procedure: OR    Indications and Patient Condition  Indications for airway management: anesthesia and airway protection  Spontaneous Ventilation: absent  Sedation level: deep  Preoxygenated: yes  Patient position: sniffing  MILS maintained throughout  Mask difficulty assessment: 2 - vent by mask + OA or adjuvant +/- NMBA  Planned trial extubation    Final Airway Details  Final airway type: endotracheal airway      Successful airway: ETT - double lumen left  Cuffed: yes   Successful intubation technique: video laryngoscopy  Endotracheal tube insertion site: oral  Blade: Kd  Blade size: #4  ETT DL size (fr): 39  Cormack-Lehane Classification: grade I - full view of glottis  Placement verified by: chest auscultation, bronchoscopy, capnometry and single lung ventilation   Number of attempts at approach: 2  Ventilation between attempts: BVM    Additional Comments  Intubation attempted by SRNA. Grade 1 view, but unable to advance double lumen ETT. Bag and mask ventilated between attempts with size 9 OA. Intubated by CRNA. Lips and teeth in preanesthetic condition. O2 sat maintained stable throughout attempts.

## 2024-01-09 NOTE — CARE PLAN
The patient's goals for the shift include  up to chair    The clinical goals for the shift include pain control    Problem: Psychosocial Needs  Goal: Collaborate with me, my family, and caregiver to identify my specific goals  Recent Flowsheet Documentation  Taken 1/9/2024 1628 by Dalia Urena RN  Cultural Requests During Hospitalization: none  Spiritual Requests During Hospitalization: none

## 2024-01-09 NOTE — BRIEF OP NOTE
"Date: 2024  OR Location: [facility] OR    Name: Toby Hicks \"Ed\", : 1955, Age: 68 y.o., MRN: [id number], Sex: male    Diagnosis  Pre-op Diagnosis     * Malignant neoplasm of colon, unspecified part of colon (CMS/HCC) [C18.9] Post-op Diagnosis     * Malignant neoplasm of colon, unspecified part of colon (CMS/HCC) [C18.9]     Procedures  Left VATS, LLL wedge resection, intercostal nerve block    Surgeons      * Justyn Mcgill - Primary    Resident/Fellow/Other Assistant:  Surgeon(s) and Role:     * Senia Briceño MD - Resident - Assisting    Procedure Summary  Anesthesia: General  ASA: III  Anesthesia Staff: Anesthesiologist: Kath Mclaughlin MD  CRNA: NICOLE Jonas-CRNA  SRNA: Lina Wells  Estimated Blood Loss: 10 mL  Intra-op Medications:   Medication Name Total Dose   BUPivacaine-EPINEPHrine (Marcaine w/EPI) 0.25 %-1:200,000 injection 30 mL   lactated Ringer's infusion Cannot be calculated   ceFAZolin (Ancef) injection  - Omnicell Override Pull Cannot be calculated   dexAMETHasone (Decadron) injection  - Omnicell Override Pull Cannot be calculated   fentaNYL PF (Sublimaze) injection  - Omnicell Override Pull Cannot be calculated   HYDROmorphone (Dilaudid) injection  - Omnicell Override Pull Cannot be calculated   lactated Ringer's infusion  - Omnicell Override Pull Cannot be calculated   lactated Ringer's infusion  - Omnicell Override Pull Cannot be calculated   midazolam (Versed) injection  - Omnicell Override Pull Cannot be calculated   ondansetron (Zofran) injection  - Omnicell Override Pull Cannot be calculated   propofol (Diprivan) injection  - Omnicell Override Pull Cannot be calculated   rocuronium (ZeMuron) injection  - Omnicell Override Pull Cannot be calculated              Anesthesia Record               Intraprocedure I/O Totals          Intake    Phenylephrine Drip 0.00 mL    The total shown is the total volume documented since Anesthesia Start was filed.    LR infusion 600.00 " mL    lactated Ringer's infusion 200.00 mL    Total Intake 800 mL       Output    Urine 50 mL    Total Output 50 mL       Net    Net Volume 750 mL          Specimen:   ID Type Source Tests Collected by Time   1 : LEFT LOWER LOBE WEDGE Tissue LUNG WEDGE RESECTION LEFT (SPECIFY SITE) SURGICAL PATHOLOGY EXAM Justyn Mcgill MD 1/9/2024 3429        Staff:   Circulator: Soco Hussein, RN; Doris Rosenberg RN  Relief Scrub: Elly Busch  Scrub Person: Christiana Hopson RN; James Briscoe          Findings: Small mass in left lower lobe consistent with CT imaging location of biopsy-proven colon cancer mets. Wedge resection performed. Specimen confirmed via frozen section. CT placed at conclusion of case.     Complications:  None; patient tolerated the procedure well.     Disposition: PACU - hemodynamically stable.  Condition: stable  Specimens Collected:   ID Type Source Tests Collected by Time   1 : LEFT LOWER LOBE WEDGE Tissue LUNG WEDGE RESECTION LEFT (SPECIFY SITE) SURGICAL PATHOLOGY EXAM Justyn Mcgill MD 1/9/2024 1318     Senia Briceño PGY2  General Surgery    Attending Attestation:     Justyn Mcgill  Phone Number: [phone]

## 2024-01-09 NOTE — CARE PLAN
Problem: Psychosocial Needs  Goal: Collaborate with me, my family, and caregiver to identify my specific goals  Recent Flowsheet Documentation  Taken 1/9/2024 1628 by Dalia Urena RN  Cultural Requests During Hospitalization: none  Spiritual Requests During Hospitalization: none   The patient's goals for the shift include      The clinical goals for the shift include pain control

## 2024-01-09 NOTE — ANESTHESIA PREPROCEDURE EVALUATION
"Patient: Toby Hicks \"Ed\"    Procedure Information       Date/Time: 01/09/24 1100    Procedure: Left VATS, LLL wedge resection, possible decortication (Left: Chest)    Location: [facility] OR 14 / [facility] OR    Surgeons: Justyn Mcgill MD            Relevant Problems   Anesthesia (within normal limits)      Cardiovascular  Echo in novembre 2022 showed EF 55-60%   (+) Anterior chest wall pain (Resolved)   (+) Hyperlipidemia   (+) Hypertension      Endocrine   (+) Diabetes mellitus, type 2 (CMS/HCC)   (+) Obesity, morbid (CMS/HCC)      GI   (+) Colon cancer (CMS/HCC)      /Renal   (+) Liver lesion   (+) Metastatic colon cancer to liver (CMS/HCC)      Neuro/Psych   (+) Depression   (+) Depression with anxiety      Pulmonary  Pft's in December 2023 showed DLCO of 59%, FEV1 of 71%, mild restrictive disease   Mets to the lungs from colon cancer   Previous pneumonitis now resolved    (+) Aspiration pneumonia of left lower lobe due to regurgitated food (CMS/HCC)   (+) Colon cancer metastasized to lung (CMS/HCC)   (+) LEE (obstructive sleep apnea)   (+) Pneumonia of both lungs due to infectious organism      GI/Hepatic   (+) Colon cancer (CMS/HCC)   (+) Metastatic colon cancer to liver (CMS/HCC)      Hematology (within normal limits)      Musculoskeletal   (+) Degenerative joint disease of hand      Infectious Disease   (+) Onychomycosis of toenail   (+) Pneumonia of both lungs due to infectious organism   (+) Shingles       Clinical information reviewed:                   NPO Detail:  No data recorded     Physical Exam    Airway  Mallampati: III  TM distance: <3 FB  Neck ROM: full     Cardiovascular   Rhythm: regular  Rate: normal     Dental   Comments: Left upper missing and chipped in the back    Pulmonary - normal exam     Abdominal - normal exam         Anesthesia Plan    History of general anesthesia?: yes  History of complications of general anesthesia?: no    ASA 3     general     The patient is " not a current smoker.  Patient was previously instructed to abstain from smoking on day of procedure.  Patient did not smoke on day of procedure.    intravenous induction   Postoperative administration of opioids is intended.  Trial extubation is planned.  Anesthetic plan and risks discussed with patient.  Use of blood products discussed with patient who.    Plan discussed with attending.

## 2024-01-09 NOTE — PROGRESS NOTES
"Pharmacy Medication History Review    Toby Hicks \"Ed\" is a 68 y.o. male admitted for Colon cancer (CMS/[facility]). Pharmacy reviewed the patient's jodso-mx-moctmgzts medications and allergies for accuracy.    The list below reflects the updated PTA list.   Comments regarding how patient may be taking medications differently can be found in the Admit Orders Activity  Prior to Admission Medications   Prescriptions Last Dose Informant Patient Reported?   chlorhexidine (Hibiclens) 4 % external liquid 1/9/2024 Self No   Sig: Apply topically 2 times a day for 5 days.   chlorhexidine (Peridex) 0.12 % solution 1/9/2024 Self No   Sig: Swish and spit 15 mL night before surgery and morning of surgery   cholecalciferol (Vitamin D-3) 25 MCG (1000 UT) capsule Past Month Self Yes   Sig: Take 1 capsule (25 mcg) by mouth once daily.   flaxseed oiL 1,000 mg capsule Past Month Self Yes   Sig: Take 1 capsule (1,000 mg) by mouth once daily.   glimepiride (Amaryl) 4 mg tablet 1/8/2024 Self No   Sig: TAKE 2 TABLETS BY MOUTH ONCE  DAILY IN THE MORNING . TAKE  BEFORE MEALS .   insulin glargine (Lantus Solostar U-100 Insulin) 100 unit/mL (3 mL) pen 1/8/2024 at 20 units at HS Self No   Sig: INJECT SUBCUTANEOUSLY 40 UNITS  AT BEDTIME   losartan (Cozaar) 50 mg tablet 1/8/2024 Self No   Sig: TAKE 2 TABLETS BY MOUTH ONCE  DAILY   metFORMIN  mg 24 hr tablet 1/8/2024 Self No   Sig: TAKE 2 TABLETS BY MOUTH TWICE  DAILY   omega-3 fatty acids-fish oil (Fish OiL) 340-1,000 mg capsule Past Month Self Yes   Sig: Take 1 capsule by mouth once daily.   zinc sulfate (Zincate) 220 (50 Zn) MG capsule Past Month Self Yes   Sig: Take 1 capsule (50 mg of elemental zinc) by mouth once daily.      Facility-Administered Medications: None        The list below reflects the updated allergy list. Please review each documented allergy for additional clarification and justification.  Allergies  Reviewed by Jose Montes RPh on 1/9/2024        Severity Reactions " "Comments    Oxaliplatin High Anaphylaxis, Angioedema             M2B service not offered prior to surgery, please reassess prior to patient discharge if Meds to Beds is desired.     Sources:   Pt interview  Dispense hx  OASHAJIS - no recent hx       Jose Montes PharmD  Transitions of Care Pharmacist    Secure Chat preferred   If no response call [phone] or Vocera \"Med Rec\"   "

## 2024-01-09 NOTE — OP NOTE
"Date: 2024  OR Location: [facility] OR    Name: Toby Hicks \"Ed\", : 1955, Age: 68 y.o., MRN: [id number], Sex: male    Preop Diagnosis: metastatic colon cancer  Postop Diagnosis: metastatic colon cancer    Procedures:  Left VATS wedge resection of LLL  Bronchoscopy  Intercostal nerve block    Surgeons:  Justyn Mcgill MD    Resident/Fellow/Other Assistant:  Surgeon(s) and Role:     * Senia Briceño MD - Resident - Assisting    Anesthesia: General  ASA: III  Anesthesia Staff: Anesthesiologist: Kath Mclaughlin MD  CRNA: Louis Padilla APRN-CRNA  SRNA: Lina Wells  Estimated Blood Loss: 5mL  Intra-op Medications:   Medication Name Total Dose   BUPivacaine-EPINEPHrine (Marcaine w/EPI) 0.25 %-1:200,000 injection 30 mL   lactated Ringer's infusion Cannot be calculated   ceFAZolin (Ancef) injection  - Omnicell Override Pull Cannot be calculated   dexAMETHasone (Decadron) injection  - Omnicell Override Pull Cannot be calculated   fentaNYL PF (Sublimaze) injection  - Omnicell Override Pull Cannot be calculated   HYDROmorphone (Dilaudid) injection  - Omnicell Override Pull Cannot be calculated   HYDROmorphone (Dilaudid) injection  - Omnicell Override Pull Cannot be calculated   lactated Ringer's infusion  - Omnicell Override Pull Cannot be calculated   lactated Ringer's infusion  - Omnicell Override Pull Cannot be calculated   midazolam (Versed) injection  - Omnicell Override Pull Cannot be calculated   ondansetron (Zofran) injection  - Omnicell Override Pull Cannot be calculated   propofol (Diprivan) injection  - Omnicell Override Pull Cannot be calculated   rocuronium (ZeMuron) injection  - Omnicell Override Pull Cannot be calculated   sugammadex (Bridion) injection  - Omnicell Override Pull Cannot be calculated            Anesthesia Record               Intraprocedure I/O Totals          Intake    Phenylephrine Drip 0.00 mL    The total shown is the total volume documented since Anesthesia Start was " filed.    LR infusion 600.00 mL    lactated Ringer's infusion 200.00 mL    Total Intake 800 mL       Output    Urine 50 mL    Est. Blood Loss 10 mL    Total Output 60 mL       Net    Net Volume 740 mL          Specimen:   ID Type Source Tests Collected by Time   1 : LEFT LOWER LOBE WEDGE Tissue LUNG WEDGE RESECTION LEFT (SPECIFY SITE) SURGICAL PATHOLOGY EXAM Justyn Mcgill MD 1/9/2024 1314        Staff:   Circulator: Soco Hussein RN; Doris Rosenberg RN  Relief Scrub: Elly Busch  Scrub Person: Christiana Hopson RN; James Briscoe    Findings: Metastatic colon cancer in the left lower lobe completely resected.  There is no other lesion or implants noted in the pleural space.    Indication:  This is a 68-year-old gentleman with history of stage IV colon cancer status post neoadjuvant FOLFOX and resection of primary and liver mets who found to have new solitary single mass to the left lower lobe of the lung.  He has adequate pulmonary function.  I offered minimally invasive surgical resection of the mets.  The risk of surgery including bleeding, infection, air leak and postop pain.  The alternative is SBRT.  The patient consented proceed with surgery.    Operation Details:  Patient was brought to operation room. A time-out was performed. Patient name, MRN and procedure were confirmed and all staff were in agreement. Patient received subcutaneous heparin. SCDs were placed and working. Ancef was given prior to incision. Patient was induced and intubated with a double lumen tube without issue. We then performed bronchoscopy through the double lumen tube. The bronchoscopy examination was normal, no endobronchial pathology seen. We turned the patient to right decubitus position. All pressure points are padded. Double lumen was confirmed again in correct position. The left chest was prepped and draped in sterile fashion. A pre-incision time out was performed. All staff are in agreement to proceed.     I placed my 10-30 camera  port in the 7th intercostal space anteriorly.  I then performed intercostal nerve block with quarter percent Marcaine 3-4 cc per intercostal space from the 3rd-10th.  I placed 1 additional VATS port in the 9th intercostal space posteriorly and I placed one 20 mm utility port in the 5th intercostal space anteriorly.  I placed a wound protector into this port.  This patient has a thick chest wall and small pleural space with a high riding diaphragm.  In order to better palpate this mass, I started by mobilizing the left lower lobe of the lung.  I took down the inferior pulmonary ligament.  I was able to palpate this hard nodule in the lateral segment of the left lower lobe as expected on the imaging.  I marked the spot with a blue ink. I then used 4 fire of black loads stapler to completely resect the tumor. I removed the specimen from the wound protector. I then palpated the specimen myself and confirmed the area of concern was within the specimen.  The specimen was then sent for frozen section. The frozen come back confirming metastatic colon cancer as expected with negative margin.  I then went to talk to the pathologist myself and reviewed the frozen section slides to confirm the findings.     I then returned to the operating room. I ensured hemostasis of the port site and the dissection site.  I left a 28 Jordanian straight chest tube posteriorly.  I then reinsufflated the lung under visual inspection.  The lung come up nicely.  The chest tube was secured to the chest wall with 0 Ethibond.  The port sites were then closed with 2-0 Vicryl and 3-0 Vicryl sutures.  The final count was correct. Then the patient was allowed to wake up from anesthesia without difficulty and brought to the recovery room in stable condition.    I was present for the entire duration of the procedure.    Justyn Mcgill MD  Thoracic Surgeon  [facility]  Assistant  Professor of Medicine  [facility]  Office phone: [phone]  Fax: [phone]  Pager: [phone]

## 2024-01-09 NOTE — CARE PLAN
Problem: Daily Care  Goal: Daily care needs are met  Outcome: Progressing     Problem: Safety  Goal: I will remain free of falls  Outcome: Progressing     Problem: Pain  Goal: My pain/discomfort is manageable  Outcome: Progressing   The patient's goals for the shift include      The clinical goals for the shift include pain control

## 2024-01-09 NOTE — ANESTHESIA POSTPROCEDURE EVALUATION
"Patient: Toby Hicks \"Ed\"    Procedure Summary       Date: 01/09/24 Room / Location: [facility] OR 14 / [facility] [place]    Anesthesia Start: 1142 Anesthesia Stop: 1411    Procedure: Left VATS, LLL wedge resection, possible decortication (Left: Chest) Diagnosis:       Malignant neoplasm of colon, unspecified part of colon (CMS/HCC)      (Malignant neoplasm of colon, unspecified part of colon (CMS/HCC) [C18.9])    Surgeons: Justyn Mcgill MD Responsible Provider: Kath Mclaughlin MD    Anesthesia Type: general ASA Status: 3            Anesthesia Type: general    Vitals Value Taken Time   /60 01/09/24 1415   Temp 36 °C (96.8 °F) 01/09/24 1400   Pulse 78 01/09/24 1430   Resp 13 01/09/24 1430   SpO2 96 % 01/09/24 1430   Vitals shown include unvalidated device data.    Anesthesia Post Evaluation    Patient location during evaluation: PACU  Patient participation: complete - patient participated  Level of consciousness: awake and alert  Pain management: satisfactory to patient  Airway patency: patent  Cardiovascular status: acceptable and blood pressure returned to baseline  Respiratory status: acceptable  Hydration status: acceptable  Postoperative Nausea and Vomiting: none    There were no known notable events for this encounter.    "

## 2024-01-10 ENCOUNTER — APPOINTMENT (OUTPATIENT)
Dept: RADIOLOGY | Facility: HOSPITAL | Age: 69
DRG: 164 | End: 2024-01-10
Payer: MEDICARE

## 2024-01-10 VITALS
RESPIRATION RATE: 18 BRPM | HEART RATE: 93 BPM | HEIGHT: 70 IN | DIASTOLIC BLOOD PRESSURE: 86 MMHG | BODY MASS INDEX: 36.49 KG/M2 | TEMPERATURE: 97.2 F | SYSTOLIC BLOOD PRESSURE: 131 MMHG | WEIGHT: 254.85 LBS | OXYGEN SATURATION: 94 %

## 2024-01-10 LAB
ANION GAP SERPL CALC-SCNC: 18 MMOL/L (ref 10–20)
BUN SERPL-MCNC: 17 MG/DL (ref 6–23)
CALCIUM SERPL-MCNC: 9.3 MG/DL (ref 8.6–10.6)
CHLORIDE SERPL-SCNC: 99 MMOL/L (ref 98–107)
CO2 SERPL-SCNC: 22 MMOL/L (ref 21–32)
CREAT SERPL-MCNC: 1.18 MG/DL (ref 0.5–1.3)
EGFRCR SERPLBLD CKD-EPI 2021: 67 ML/MIN/1.73M*2
ERYTHROCYTE [DISTWIDTH] IN BLOOD BY AUTOMATED COUNT: 12.9 % (ref 11.5–14.5)
GLUCOSE BLD MANUAL STRIP-MCNC: 189 MG/DL (ref 74–99)
GLUCOSE BLD MANUAL STRIP-MCNC: 234 MG/DL (ref 74–99)
GLUCOSE BLD MANUAL STRIP-MCNC: 337 MG/DL (ref 74–99)
GLUCOSE SERPL-MCNC: 224 MG/DL (ref 74–99)
HCT VFR BLD AUTO: 39.5 % (ref 41–52)
HGB BLD-MCNC: 13.2 G/DL (ref 13.5–17.5)
MAGNESIUM SERPL-MCNC: 2.02 MG/DL (ref 1.6–2.4)
MCH RBC QN AUTO: 31.1 PG (ref 26–34)
MCHC RBC AUTO-ENTMCNC: 33.4 G/DL (ref 32–36)
MCV RBC AUTO: 93 FL (ref 80–100)
NRBC BLD-RTO: 0 /100 WBCS (ref 0–0)
PLATELET # BLD AUTO: 315 X10*3/UL (ref 150–450)
POTASSIUM SERPL-SCNC: 4.7 MMOL/L (ref 3.5–5.3)
RBC # BLD AUTO: 4.25 X10*6/UL (ref 4.5–5.9)
SODIUM SERPL-SCNC: 134 MMOL/L (ref 136–145)
WBC # BLD AUTO: 17.2 X10*3/UL (ref 4.4–11.3)

## 2024-01-10 PROCEDURE — 99232 SBSQ HOSP IP/OBS MODERATE 35: CPT | Performed by: NURSE PRACTITIONER

## 2024-01-10 PROCEDURE — 94760 N-INVAS EAR/PLS OXIMETRY 1: CPT

## 2024-01-10 PROCEDURE — 83735 ASSAY OF MAGNESIUM: CPT

## 2024-01-10 PROCEDURE — 82947 ASSAY GLUCOSE BLOOD QUANT: CPT

## 2024-01-10 PROCEDURE — 71045 X-RAY EXAM CHEST 1 VIEW: CPT

## 2024-01-10 PROCEDURE — 71045 X-RAY EXAM CHEST 1 VIEW: CPT | Performed by: RADIOLOGY

## 2024-01-10 PROCEDURE — 1200000002 HC GENERAL ROOM WITH TELEMETRY DAILY

## 2024-01-10 PROCEDURE — 85027 COMPLETE CBC AUTOMATED: CPT

## 2024-01-10 PROCEDURE — 2500000004 HC RX 250 GENERAL PHARMACY W/ HCPCS (ALT 636 FOR OP/ED)

## 2024-01-10 PROCEDURE — 2500000001 HC RX 250 WO HCPCS SELF ADMINISTERED DRUGS (ALT 637 FOR MEDICARE OP): Performed by: NURSE PRACTITIONER

## 2024-01-10 PROCEDURE — 80048 BASIC METABOLIC PNL TOTAL CA: CPT

## 2024-01-10 RX ORDER — OXYCODONE HYDROCHLORIDE 5 MG/1
5 TABLET ORAL EVERY 4 HOURS PRN
Status: DISCONTINUED | OUTPATIENT
Start: 2024-01-10 | End: 2024-01-10 | Stop reason: HOSPADM

## 2024-01-10 RX ORDER — ACETAMINOPHEN 325 MG/1
650 TABLET ORAL EVERY 4 HOURS
Status: DISCONTINUED | OUTPATIENT
Start: 2024-01-10 | End: 2024-01-10 | Stop reason: HOSPADM

## 2024-01-10 RX ORDER — OXYCODONE HYDROCHLORIDE 5 MG/1
10 TABLET ORAL EVERY 4 HOURS PRN
Status: DISCONTINUED | OUTPATIENT
Start: 2024-01-10 | End: 2024-01-10 | Stop reason: HOSPADM

## 2024-01-10 RX ORDER — OXYCODONE HYDROCHLORIDE 5 MG/1
5 TABLET ORAL EVERY 6 HOURS PRN
Qty: 20 TABLET | Refills: 0 | Status: SHIPPED | OUTPATIENT
Start: 2024-01-10

## 2024-01-10 RX ORDER — ACETAMINOPHEN 325 MG/1
650 TABLET ORAL EVERY 4 HOURS
Start: 2024-01-10

## 2024-01-10 RX ADMIN — OXYCODONE HYDROCHLORIDE 10 MG: 5 TABLET ORAL at 15:40

## 2024-01-10 RX ADMIN — HEPARIN SODIUM 5000 UNITS: 5000 INJECTION INTRAVENOUS; SUBCUTANEOUS at 10:19

## 2024-01-10 RX ADMIN — CEFAZOLIN SODIUM 2 G: 2 INJECTION, SOLUTION INTRAVENOUS at 04:39

## 2024-01-10 RX ADMIN — OXYCODONE HYDROCHLORIDE 10 MG: 5 TABLET ORAL at 10:19

## 2024-01-10 RX ADMIN — INSULIN LISPRO 8 UNITS: 100 INJECTION, SOLUTION INTRAVENOUS; SUBCUTANEOUS at 12:30

## 2024-01-10 RX ADMIN — ACETAMINOPHEN 650 MG: 325 TABLET ORAL at 15:40

## 2024-01-10 RX ADMIN — HEPARIN SODIUM 5000 UNITS: 5000 INJECTION INTRAVENOUS; SUBCUTANEOUS at 00:14

## 2024-01-10 ASSESSMENT — PAIN SCALES - GENERAL
PAINLEVEL_OUTOF10: 7
PAINLEVEL_OUTOF10: 7

## 2024-01-10 ASSESSMENT — PAIN - FUNCTIONAL ASSESSMENT
PAIN_FUNCTIONAL_ASSESSMENT: 0-10
PAIN_FUNCTIONAL_ASSESSMENT: 0-10

## 2024-01-10 NOTE — DISCHARGE SUMMARY
"Discharge Diagnosis  Colon cancer (CMS/HCC)    Issues Requiring Follow-Up  Final pathology     Test Results Pending At Discharge  Pending Labs       Order Current Status    Surgical Pathology Exam In process            Hospital Course  Toby Hicks is a 68 y.o. male with a history of stage IV colon cancer status post neoadjuvant FOLFOX and resection of primary and liver mets who found to have new solitary single mass to the left lower lobe of the lung. He is now status post Left VATS wedge resection of LLL, Bronchoscopy and Intercostal nerve block. Chest tube and amos removed pod #1. Satisfactory post pull CXR.    At the time of discharge, his pain was controlled on an oral pain regimen, He was breathing comfortably on room air.  Hewas ambulating independently.  He was tolerating a regular diet without nausea. He was voiding and moving bowels regularly.      The patient was educated on post operative activity restrictions, dietary guidelines, and pain management. He will follow up with Dr. Mcgill in the outpatient setting in two weeks with a CXR just prior to this visit. The patient has been instructed to add an OTC stool softeners or laxative while taking oral analgesia.  Deep breathing, coughing, and walking at home encouraged.      Pertinent Physical Exam At Time of Discharge  General: He is a pleasant male currently in no distress seen sitting at side of bed.  /88 (BP Location: Left arm, Patient Position: Sitting)   Pulse 94   Temp 35.9 °C (96.6 °F) (Temporal)   Resp 17   Ht 1.778 m (5' 10\")   Wt 116 kg (254 lb 13.6 oz)   SpO2 94%   BMI 36.57 kg/m²    Body mass index is 36.57 kg/m².   HEENT: Normocephalic and atraumatic.   NECK: Supple. Trach midline. No JVD.   CHEST: Breathing comfortably on 2L NC. Chest tube without airleak, minimal drg, removed on AM rounds.   HEART: Regular rate and rhythm. NSR per tele review.   ABDOMEN: Obese, soft, flat, nontender.   : awaiting void after amos removal "   NEUROLOGIC: Alert and oriented. Grossly intact.   EXTREMITIES: Moves all extremities equally.  Pedal pulses are palpable. No lower extremity edema. No calf tenderness.     Home Medications     Medication List      START taking these medications     acetaminophen 325 mg tablet; Commonly known as: Tylenol; Take 2 tablets   (650 mg) by mouth every 4 hours.   oxyCODONE 5 mg immediate release tablet; Commonly known as: Roxicodone;   Take 1 tablet (5 mg) by mouth every 6 hours if needed (pain).     CHANGE how you take these medications     chlorhexidine 0.12 % solution; Commonly known as: Peridex; Swish and   spit 15 mL night before surgery and morning of surgery; What changed:   Another medication with the same name was removed. Continue taking this   medication, and follow the directions you see here.     CONTINUE taking these medications     cholecalciferol 25 MCG (1000 UT) capsule; Commonly known as: Vitamin D-3   Fish OiL 340-1,000 mg capsule; Generic drug: omega-3 fatty acids-fish   oil   flaxseed oiL 1,000 mg capsule   glimepiride 4 mg tablet; Commonly known as: Amaryl; TAKE 2 TABLETS BY   MOUTH ONCE  DAILY IN THE MORNING . TAKE  BEFORE MEALS .   insulin glargine 100 unit/mL (3 mL) pen; Commonly known as: Lantus   Solostar U-100 Insulin; INJECT SUBCUTANEOUSLY 40 UNITS  AT BEDTIME   losartan 50 mg tablet; Commonly known as: Cozaar; TAKE 2 TABLETS BY   MOUTH ONCE  DAILY   metFORMIN  mg 24 hr tablet; Commonly known as: Glucophage-XR; TAKE   2 TABLETS BY MOUTH TWICE  DAILY   OneTouch Verio test strips strip; Generic drug: blood sugar diagnostic;   TEST BID   zinc sulfate 220 (50 Zn) MG capsule; Commonly known as: Zincate       Outpatient Follow-Up  Future Appointments   Date Time Provider Department Center   1/24/2024  1:00 PM INF 6A [facility] [id number] [place]   1/25/2024 10:00 AM Justyn Mcgill MD [id number] [place]   3/13/2024 10:00 AM [facility] [id number] CT [id number] [facility] [place]   3/13/2024 10:45 AM [facility] [id number]  CT [id number] [facility] [place]   3/18/2024 11:00 AM Luis Palacios MD [id number] [place]       Cassidy Dunn, APRN-CNP

## 2024-01-10 NOTE — CARE PLAN
The patient's goals for the shift include patient rest comfortably.     The clinical goals for the shift include pain managment    Problem: Pain  Goal: My pain/discomfort is manageable  Outcome: Progressing     Problem: Safety  Goal: Patient will be injury free during hospitalization  Outcome: Progressing  Goal: I will remain free of falls  Outcome: Progressing     Problem: Daily Care  Goal: Daily care needs are met  Outcome: Progressing     Problem: Psychosocial Needs  Goal: Demonstrates ability to cope with hospitalization/illness  Outcome: Progressing  Goal: Collaborate with me, my family, and caregiver to identify my specific goals  Outcome: Progressing     Problem: Discharge Barriers  Goal: My discharge needs are met  Outcome: Progressing     Problem: Fall/Injury  Goal: Not fall by end of shift  Outcome: Progressing  Goal: Be free from injury by end of the shift  Outcome: Progressing  Goal: Verbalize understanding of personal risk factors for fall in the hospital  Outcome: Progressing  Goal: Verbalize understanding of risk factor reduction measures to prevent injury from fall in the home  Outcome: Progressing  Goal: Use assistive devices by end of the shift  Outcome: Progressing  Goal: Pace activities to prevent fatigue by end of the shift  Outcome: Progressing

## 2024-01-10 NOTE — HOSPITAL COURSE
Toby Hicks is a 68 y.o. male with a history of stage IV colon cancer status post neoadjuvant FOLFOX and resection of primary and liver mets who found to have new solitary single mass to the left lower lobe of the lung. He is now status post Left VATS wedge resection of LLL, Bronchoscopy and Intercostal nerve block. Chest tube and amos removed pod #1. Satisfactory post pull CXR.

## 2024-01-10 NOTE — PROGRESS NOTES
Transitional Care Coordinator   Met with patient and introduced myself as Care Coordinator and member of the discharge planning team.  Patient is s/p LLL wedge. Plans to return home at time of discharge with assistance from a friend. Friend will provide transportation home. Pt wears home oxygen at night. Says he has everything he needs. Will continue to follow for home going needs. Krupa Car RN

## 2024-01-10 NOTE — PROGRESS NOTES
"Thoracic Surgery Progress Note  1/10/2024    Toby Hicks is a 68 y.o. male with a history of stage IV colon cancer status post neoadjuvant FOLFOX and resection of primary and liver mets who found to have new solitary single mass to the left lower lobe of the lung. He is now 1 Day Post-Op status post Left VATS wedge resection of LLL, Bronchoscopy and Intercostal nerve block     Overnight issues: No overnight issues. Chest tube removed on AM rounds with satisfactory post pull imaging. Amos removed this AM, awaiting void.     Physical Exam:  General: He is a pleasant male currently in no distress seen sitting at side of bed.  /88 (BP Location: Left arm, Patient Position: Sitting)   Pulse 94   Temp 35.9 °C (96.6 °F) (Temporal)   Resp 17   Ht 1.778 m (5' 10\")   Wt 116 kg (254 lb 13.6 oz)   SpO2 94%   BMI 36.57 kg/m²    Body mass index is 36.57 kg/m².   HEENT: Normocephalic and atraumatic.   NECK: Supple. Trach midline. No JVD.   CHEST: Breathing comfortably on 2L NC. Chest tube without airleak, minimal drg, removed on AM rounds.   HEART: Regular rate and rhythm. NSR per tele review.   ABDOMEN: Obese, soft, flat, nontender.   : awaiting void after amos removal   NEUROLOGIC: Alert and oriented. Grossly intact.   EXTREMITIES: Moves all extremities equally.  Pedal pulses are palpable. No lower extremity edema. No calf tenderness.     Diagnostics:   Component      Latest Ref Rng 1/10/2024   WBC      4.4 - 11.3 x10*3/uL 17.2 (H)    nRBC      0.0 - 0.0 /100 WBCs 0.0    RBC      4.50 - 5.90 x10*6/uL 4.25 (L)    HEMOGLOBIN      13.5 - 17.5 g/dL 13.2 (L)    HEMATOCRIT      41.0 - 52.0 % 39.5 (L)    MCV      80 - 100 fL 93    MCH      26.0 - 34.0 pg 31.1    MCHC      32.0 - 36.0 g/dL 33.4    RED CELL DISTRIBUTION WIDTH      11.5 - 14.5 % 12.9    Platelets      150 - 450 x10*3/uL 315    GLUCOSE      74 - 99 mg/dL 224 (H)    SODIUM      136 - 145 mmol/L 134 (L)    POTASSIUM      3.5 - 5.3 mmol/L 4.7  "   CHLORIDE      98 - 107 mmol/L 99    Bicarbonate      21 - 32 mmol/L 22    Anion Gap      10 - 20 mmol/L 18    Blood Urea Nitrogen      6 - 23 mg/dL 17    Creatinine      0.50 - 1.30 mg/dL 1.18    EGFR      >60 mL/min/1.73m*2 67    Calcium      8.6 - 10.6 mg/dL 9.3    MAGNESIUM      1.60 - 2.40 mg/dL 2.02       Scheduled medications  acetaminophen, 650 mg, oral, q4h  ceFAZolin, , ,   heparin (porcine), 5,000 Units, subcutaneous, q8h  insulin glargine, 20 Units, subcutaneous, Nightly  insulin lispro, 0-10 Units, subcutaneous, TID with meals  labetaloL, , ,   lidocaine (cardiac), , ,   [Held by provider] losartan, 100 mg, oral, Daily  rocuronium, , ,   sennosides-docusate sodium, 2 tablet, oral, BID  sevoflurane, , ,       Continuous medications     PRN medications  PRN medications: ceFAZolin, dextrose 10 % in water (D10W), dextrose, glucagon, ipratropium-albuteroL, labetaloL, lidocaine (cardiac), naloxone, ondansetron, oxyCODONE, oxyCODONE, oxygen, rocuronium, sevoflurane    Imaging:   AM CXR reviewed. Expected post op changes and chest tube placement. No clinically significant pneumothorax. No formal report at this time.      Assessment:  Toby Hicks is a 68 y.o. male with a history of stage IV colon cancer status post neoadjuvant FOLFOX and resection of primary and liver mets who found to have new solitary single mass to the left lower lobe of the lung. He is now status post Left VATS wedge resection of LLL, Bronchoscopy and Intercostal nerve block. Chest tube and amos removed pod #1. Satisfactory post pull CXR, awaiting void.      Plan:  Neurology: post operative pain  -Discontinue PCA pump  -Begin oral regimen of pain control with oxycodone and Tylenol   -Bowel regimen available for constipation secondary to pain medication   -Out of bed to the chair throughout the day  -Encourage ambulation as tolerated    Cardiovascular:  hx HTN  -Continue telemetry  -Vital signs every four hours   -Continue to hold   home regimen of  Cozaar 100mg daily   -Replace electrolytes as needed for K >4, Mg >2    Pulmonology: post op atelectasis, chest tube management, former, smoker, home oxygen 2-3L at night.   -Encourage incentive spirometer use every hour  -Continue pulmonary hygiene  -Wean oxygen as tolerated   -CT d/c'd pod #1  -Obtain daily CXR    Gastrointestinal:   - diabetic diet as tolerated  - Zofran available for nausea  - scheduled colace, PRN bowel regimen    Genitourinary: hx BPH   -Remove amos  -Await spontaneous void trial  -Continue to monitor daily electrolytes with routine BMPs   -Adequate urine output    Infectious Disease:   -Continue to trend daily temperatures and WBC count to monitor for signs of post-operative infection   -Monitor surgical incisions for signs of infection   -Perioperative antibiotics completed     Hematology:   -Monitor for signs of acute blood loss  -Trend CBCs     Endocrine:  hx DM2  - SSI   - 1/2 dose of home lantus  - hold home glimepiride and metformin in immediate post op period  - trend sugars and adjust above regimen as needed.     DVT Prophylaxis:   -Continue subcutaneous heparin and SCDs, early ambulation    Disposition:  -Plan for discharge to home once stable from med-surg standpoint, as early as this evening if patient is agreeable.   -Likely HHC (lives alone) and resume nocturnal home oxygen.       Patient seen and examined by this provider. Plan of care discussed with Dr. Artem Dunn, APRN-CNP  Thoracic Surgery Pager [phone]

## 2024-01-11 ENCOUNTER — HOME HEALTH ADMISSION (OUTPATIENT)
Dept: HOME HEALTH SERVICES | Facility: HOME HEALTH | Age: 69
End: 2024-01-11
Payer: MEDICARE

## 2024-01-11 ENCOUNTER — DOCUMENTATION (OUTPATIENT)
Dept: HOME HEALTH SERVICES | Facility: HOME HEALTH | Age: 69
End: 2024-01-11
Payer: MEDICARE

## 2024-01-11 NOTE — HH CARE COORDINATION
Home Care received a Referral for Nursing. We have processed the referral for a Start of Care on 1/13 to 1/14/24.     If you have any questions or concerns, please feel free to contact us at [phone]. Follow the prompts, enter your five digit zip code, and you will be directed to your care team on WEST 2.

## 2024-01-15 LAB
LABORATORY COMMENT REPORT: NORMAL
Lab: NORMAL
PATH REPORT.FINAL DX SPEC: NORMAL
PATH REPORT.GROSS SPEC: NORMAL
PATH REPORT.RELEVANT HX SPEC: NORMAL
PATH REPORT.TOTAL CANCER: NORMAL

## 2024-01-18 DIAGNOSIS — E11.9 TYPE 2 DIABETES MELLITUS WITHOUT COMPLICATION, WITH LONG-TERM CURRENT USE OF INSULIN (MULTI): Primary | ICD-10-CM

## 2024-01-18 DIAGNOSIS — Z79.4 TYPE 2 DIABETES MELLITUS WITHOUT COMPLICATION, WITH LONG-TERM CURRENT USE OF INSULIN (MULTI): Primary | ICD-10-CM

## 2024-01-24 ENCOUNTER — INFUSION (OUTPATIENT)
Dept: HEMATOLOGY/ONCOLOGY | Facility: CLINIC | Age: 69
End: 2024-01-24
Payer: MEDICARE

## 2024-01-24 ENCOUNTER — HOSPITAL ENCOUNTER (OUTPATIENT)
Dept: RADIOLOGY | Facility: CLINIC | Age: 69
Discharge: HOME | End: 2024-01-24
Payer: MEDICARE

## 2024-01-24 DIAGNOSIS — C78.7 METASTATIC COLON CANCER TO LIVER (MULTI): ICD-10-CM

## 2024-01-24 DIAGNOSIS — C18.9 COLON CANCER METASTASIZED TO LUNG (MULTI): ICD-10-CM

## 2024-01-24 DIAGNOSIS — C18.9 MALIGNANT NEOPLASM OF COLON, UNSPECIFIED PART OF COLON (MULTI): ICD-10-CM

## 2024-01-24 DIAGNOSIS — C18.9 METASTATIC COLON CANCER TO LIVER (MULTI): ICD-10-CM

## 2024-01-24 DIAGNOSIS — C78.00 COLON CANCER METASTASIZED TO LUNG (MULTI): ICD-10-CM

## 2024-01-24 LAB
ALBUMIN SERPL BCP-MCNC: 4.1 G/DL (ref 3.4–5)
ALP SERPL-CCNC: 54 U/L (ref 33–136)
ALT SERPL W P-5'-P-CCNC: 23 U/L (ref 10–52)
ANION GAP SERPL CALC-SCNC: 12 MMOL/L (ref 10–20)
AST SERPL W P-5'-P-CCNC: 16 U/L (ref 9–39)
BASOPHILS # BLD AUTO: 0.03 X10*3/UL (ref 0–0.1)
BASOPHILS NFR BLD AUTO: 0.5 %
BILIRUB SERPL-MCNC: 0.4 MG/DL (ref 0–1.2)
BUN SERPL-MCNC: 19 MG/DL (ref 6–23)
CALCIUM SERPL-MCNC: 9.8 MG/DL (ref 8.6–10.3)
CEA SERPL-MCNC: 2.6 UG/L
CHLORIDE SERPL-SCNC: 105 MMOL/L (ref 98–107)
CO2 SERPL-SCNC: 27 MMOL/L (ref 21–32)
CREAT SERPL-MCNC: 0.9 MG/DL (ref 0.5–1.3)
EGFRCR SERPLBLD CKD-EPI 2021: >90 ML/MIN/1.73M*2
EOSINOPHIL # BLD AUTO: 0.29 X10*3/UL (ref 0–0.7)
EOSINOPHIL NFR BLD AUTO: 4.4 %
ERYTHROCYTE [DISTWIDTH] IN BLOOD BY AUTOMATED COUNT: 12.3 % (ref 11.5–14.5)
GLUCOSE SERPL-MCNC: 159 MG/DL (ref 74–99)
HCT VFR BLD AUTO: 40.3 % (ref 41–52)
HGB BLD-MCNC: 13.4 G/DL (ref 13.5–17.5)
IMM GRANULOCYTES # BLD AUTO: 0.01 X10*3/UL (ref 0–0.7)
IMM GRANULOCYTES NFR BLD AUTO: 0.2 % (ref 0–0.9)
LYMPHOCYTES # BLD AUTO: 1.58 X10*3/UL (ref 1.2–4.8)
LYMPHOCYTES NFR BLD AUTO: 24 %
MCH RBC QN AUTO: 30 PG (ref 26–34)
MCHC RBC AUTO-ENTMCNC: 33.3 G/DL (ref 32–36)
MCV RBC AUTO: 90 FL (ref 80–100)
MONOCYTES # BLD AUTO: 0.48 X10*3/UL (ref 0.1–1)
MONOCYTES NFR BLD AUTO: 7.3 %
NEUTROPHILS # BLD AUTO: 4.2 X10*3/UL (ref 1.2–7.7)
NEUTROPHILS NFR BLD AUTO: 63.6 %
PLATELET # BLD AUTO: 307 X10*3/UL (ref 150–450)
POTASSIUM SERPL-SCNC: 3.9 MMOL/L (ref 3.5–5.3)
PROT SERPL-MCNC: 7 G/DL (ref 6.4–8.2)
RBC # BLD AUTO: 4.46 X10*6/UL (ref 4.5–5.9)
SODIUM SERPL-SCNC: 140 MMOL/L (ref 136–145)
WBC # BLD AUTO: 6.6 X10*3/UL (ref 4.4–11.3)

## 2024-01-24 PROCEDURE — 36591 DRAW BLOOD OFF VENOUS DEVICE: CPT

## 2024-01-24 PROCEDURE — 71046 X-RAY EXAM CHEST 2 VIEWS: CPT

## 2024-01-24 PROCEDURE — 80053 COMPREHEN METABOLIC PANEL: CPT

## 2024-01-24 PROCEDURE — 71046 X-RAY EXAM CHEST 2 VIEWS: CPT | Performed by: RADIOLOGY

## 2024-01-24 PROCEDURE — 85025 COMPLETE CBC W/AUTO DIFF WBC: CPT

## 2024-01-24 PROCEDURE — 82378 CARCINOEMBRYONIC ANTIGEN: CPT

## 2024-01-24 RX ORDER — HEPARIN SODIUM,PORCINE/PF 10 UNIT/ML
50 SYRINGE (ML) INTRAVENOUS AS NEEDED
Status: CANCELLED | OUTPATIENT
Start: 2024-01-24

## 2024-01-24 NOTE — PROGRESS NOTES
"Subjective   Toby Hicks \"Ed\"  is a 69 y.o. male with a pmhx of stage IV [id number] colon cancer mets to liver and lung s/p neoadjuvant FOLFOX, lap extended right hemicolectomy with Dr Sotomayor, and MWA of liver lesions by Dr Preciado on 6/28/22, DM2, HTN and former smoker who presents today for postop follow up. He underwent left VATS LLL wedge resection of pulmonary mets on 1/9/24. He had a speedy recovery from surgery and discharged home on 1/10.  He presents today for follow up. He is doing well. Pain is tolerable. He denies SOB. He still has some coughing with pleuritic pain at the anterior chest wall near the sternum.    There is no significant change in patient's past medical history, past surgical history, social history, family history, or review of systems since last visit.     Objective   There were no vitals taken for this visit.  Physical Exam  Constitutional:       General: He is not in acute distress.     Appearance: Normal appearance. He is not ill-appearing, toxic-appearing or diaphoretic.   HENT:      Head: Normocephalic and atraumatic.      Mouth/Throat:      Mouth: Mucous membranes are moist.      Pharynx: Oropharynx is clear.   Eyes:      General: No scleral icterus.     Extraocular Movements: Extraocular movements intact.      Conjunctiva/sclera: Conjunctivae normal.      Pupils: Pupils are equal, round, and reactive to light.   Cardiovascular:      Rate and Rhythm: Normal rate and regular rhythm.      Pulses: Normal pulses.      Heart sounds: Normal heart sounds.   Pulmonary:      Effort: Pulmonary effort is normal. No respiratory distress.      Breath sounds: Normal breath sounds. No stridor. No wheezing, rhonchi or rales.      Comments: Incisions well healed, clean, dry and intact.  Chest:      Chest wall: No tenderness.   Abdominal:      General: There is no distension.      Palpations: Abdomen is soft.      Tenderness: There is no abdominal tenderness. There is no guarding or rebound. " "  Musculoskeletal:         General: No swelling or tenderness. Normal range of motion.      Cervical back: Normal range of motion and neck supple. No rigidity or tenderness.      Right lower leg: No edema.      Left lower leg: No edema.   Skin:     General: Skin is warm and dry.      Coloration: Skin is not jaundiced.   Neurological:      General: No focal deficit present.      Mental Status: He is alert and oriented to person, place, and time. Mental status is at baseline.   Psychiatric:         Mood and Affect: Mood normal.         Behavior: Behavior normal.           Diagnostic Review  I have reviewed CXR from today and compared it to CXR from 1/10/24. It showed expanded lungs without PTX or effusion.   I reviewed the operative note. It showed left VATS wedge resection with negative margin. No other pleural implants or invasion noted.  I reviewed the pathology report. It showed metastatic adenocarcinoma  consistent with metastatic from known colon cancer. Tumor size 1.1cm with no pleural involvement and the resection margin is free of malignancy.    Assessment/Plan   Toby Hicks \"Ed\"  is doing well. We discussed about the pathology result. The mets is completely resected with negative margin. I recommend follow up with Dr Palacios for system therapy. For the pain, it could be post-surgical with nerve pain. It usually will go away in a few months. He can follow with me in as needed basis.     Justyn Mcgill MD  Thoracic Surgeon  [facility]   of Medicine  [facility]  Office phone: [phone]  Fax: [phone]  Pager: [phone]    "

## 2024-01-25 ENCOUNTER — OFFICE VISIT (OUTPATIENT)
Dept: SURGERY | Facility: CLINIC | Age: 69
End: 2024-01-25
Payer: MEDICARE

## 2024-01-25 VITALS
WEIGHT: 252 LBS | DIASTOLIC BLOOD PRESSURE: 68 MMHG | SYSTOLIC BLOOD PRESSURE: 103 MMHG | HEART RATE: 100 BPM | HEIGHT: 70 IN | TEMPERATURE: 97.1 F | OXYGEN SATURATION: 98 % | BODY MASS INDEX: 36.08 KG/M2

## 2024-01-25 DIAGNOSIS — C78.00 COLON CANCER METASTASIZED TO LUNG (MULTI): Primary | ICD-10-CM

## 2024-01-25 DIAGNOSIS — C18.9 COLON CANCER METASTASIZED TO LUNG (MULTI): Primary | ICD-10-CM

## 2024-01-25 PROCEDURE — 3078F DIAST BP <80 MM HG: CPT | Performed by: STUDENT IN AN ORGANIZED HEALTH CARE EDUCATION/TRAINING PROGRAM

## 2024-01-25 PROCEDURE — 3046F HEMOGLOBIN A1C LEVEL >9.0%: CPT | Performed by: STUDENT IN AN ORGANIZED HEALTH CARE EDUCATION/TRAINING PROGRAM

## 2024-01-25 PROCEDURE — 4010F ACE/ARB THERAPY RXD/TAKEN: CPT | Performed by: STUDENT IN AN ORGANIZED HEALTH CARE EDUCATION/TRAINING PROGRAM

## 2024-01-25 PROCEDURE — 1159F MED LIST DOCD IN RCRD: CPT | Performed by: STUDENT IN AN ORGANIZED HEALTH CARE EDUCATION/TRAINING PROGRAM

## 2024-01-25 PROCEDURE — 1160F RVW MEDS BY RX/DR IN RCRD: CPT | Performed by: STUDENT IN AN ORGANIZED HEALTH CARE EDUCATION/TRAINING PROGRAM

## 2024-01-25 PROCEDURE — 3074F SYST BP LT 130 MM HG: CPT | Performed by: STUDENT IN AN ORGANIZED HEALTH CARE EDUCATION/TRAINING PROGRAM

## 2024-01-25 PROCEDURE — 1036F TOBACCO NON-USER: CPT | Performed by: STUDENT IN AN ORGANIZED HEALTH CARE EDUCATION/TRAINING PROGRAM

## 2024-01-25 PROCEDURE — 1126F AMNT PAIN NOTED NONE PRSNT: CPT | Performed by: STUDENT IN AN ORGANIZED HEALTH CARE EDUCATION/TRAINING PROGRAM

## 2024-01-25 PROCEDURE — 99024 POSTOP FOLLOW-UP VISIT: CPT | Performed by: STUDENT IN AN ORGANIZED HEALTH CARE EDUCATION/TRAINING PROGRAM

## 2024-01-25 PROCEDURE — 1111F DSCHRG MED/CURRENT MED MERGE: CPT | Performed by: STUDENT IN AN ORGANIZED HEALTH CARE EDUCATION/TRAINING PROGRAM

## 2024-02-13 DIAGNOSIS — E11.9 TYPE 2 DIABETES MELLITUS WITHOUT COMPLICATION, WITHOUT LONG-TERM CURRENT USE OF INSULIN (MULTI): ICD-10-CM

## 2024-02-13 DIAGNOSIS — I10 HYPERTENSION, UNSPECIFIED TYPE: ICD-10-CM

## 2024-02-13 RX ORDER — GLIMEPIRIDE 4 MG/1
TABLET ORAL
Qty: 180 TABLET | Refills: 0 | Status: SHIPPED | OUTPATIENT
Start: 2024-02-13

## 2024-02-13 RX ORDER — METFORMIN HYDROCHLORIDE 500 MG/1
1000 TABLET, EXTENDED RELEASE ORAL 2 TIMES DAILY
Qty: 360 TABLET | Refills: 0 | Status: SHIPPED | OUTPATIENT
Start: 2024-02-13

## 2024-02-13 RX ORDER — LOSARTAN POTASSIUM 50 MG/1
100 TABLET ORAL DAILY
Qty: 180 TABLET | Refills: 0 | Status: SHIPPED | OUTPATIENT
Start: 2024-02-13

## 2024-03-11 ENCOUNTER — TELEPHONE (OUTPATIENT)
Dept: HEMATOLOGY/ONCOLOGY | Facility: CLINIC | Age: 69
End: 2024-03-11
Payer: MEDICARE

## 2024-03-11 DIAGNOSIS — C18.9 MALIGNANT NEOPLASM OF COLON, UNSPECIFIED PART OF COLON (MULTI): ICD-10-CM

## 2024-03-11 NOTE — TELEPHONE ENCOUNTER
Pt has CT scheduled for 3/13/24 but does not have CT access scheduled along with labs. Spoke with pt who wishes to get labs/CT access peripherally and have port flush after Dr Palacios appointment on 3/18/24 at 1100. Pt scheduled at 1245 for port flush and labs after Dr Palacios appointment. He denied questions and was appreciative of call.

## 2024-03-12 ENCOUNTER — LAB (OUTPATIENT)
Dept: LAB | Facility: LAB | Age: 69
End: 2024-03-12
Payer: MEDICARE

## 2024-03-12 DIAGNOSIS — C18.9 METASTATIC COLON CANCER TO LIVER (MULTI): ICD-10-CM

## 2024-03-12 DIAGNOSIS — C78.7 METASTATIC COLON CANCER TO LIVER (MULTI): ICD-10-CM

## 2024-03-12 DIAGNOSIS — C18.9 MALIGNANT NEOPLASM OF COLON, UNSPECIFIED PART OF COLON (MULTI): ICD-10-CM

## 2024-03-12 LAB
ALBUMIN SERPL BCP-MCNC: 4.2 G/DL (ref 3.4–5)
ALP SERPL-CCNC: 60 U/L (ref 33–136)
ALT SERPL W P-5'-P-CCNC: 31 U/L (ref 10–52)
ANION GAP SERPL CALC-SCNC: 12 MMOL/L (ref 10–20)
AST SERPL W P-5'-P-CCNC: 23 U/L (ref 9–39)
BASOPHILS # BLD AUTO: 0.01 X10*3/UL (ref 0–0.1)
BASOPHILS NFR BLD AUTO: 0.1 %
BILIRUB SERPL-MCNC: 0.5 MG/DL (ref 0–1.2)
BUN SERPL-MCNC: 16 MG/DL (ref 6–23)
CALCIUM SERPL-MCNC: 9.6 MG/DL (ref 8.6–10.3)
CEA SERPL-MCNC: 2.8 UG/L
CHLORIDE SERPL-SCNC: 104 MMOL/L (ref 98–107)
CO2 SERPL-SCNC: 25 MMOL/L (ref 21–32)
CREAT SERPL-MCNC: 1.17 MG/DL (ref 0.5–1.3)
EGFRCR SERPLBLD CKD-EPI 2021: 67 ML/MIN/1.73M*2
EOSINOPHIL # BLD AUTO: 0.17 X10*3/UL (ref 0–0.7)
EOSINOPHIL NFR BLD AUTO: 2.5 %
ERYTHROCYTE [DISTWIDTH] IN BLOOD BY AUTOMATED COUNT: 13.3 % (ref 11.5–14.5)
GLUCOSE SERPL-MCNC: 288 MG/DL (ref 74–99)
HCT VFR BLD AUTO: 40.8 % (ref 41–52)
HGB BLD-MCNC: 13.7 G/DL (ref 13.5–17.5)
IMM GRANULOCYTES # BLD AUTO: 0.02 X10*3/UL (ref 0–0.7)
IMM GRANULOCYTES NFR BLD AUTO: 0.3 % (ref 0–0.9)
LYMPHOCYTES # BLD AUTO: 1.57 X10*3/UL (ref 1.2–4.8)
LYMPHOCYTES NFR BLD AUTO: 23.2 %
MCH RBC QN AUTO: 29.5 PG (ref 26–34)
MCHC RBC AUTO-ENTMCNC: 33.6 G/DL (ref 32–36)
MCV RBC AUTO: 88 FL (ref 80–100)
MONOCYTES # BLD AUTO: 0.5 X10*3/UL (ref 0.1–1)
MONOCYTES NFR BLD AUTO: 7.4 %
NEUTROPHILS # BLD AUTO: 4.51 X10*3/UL (ref 1.2–7.7)
NEUTROPHILS NFR BLD AUTO: 66.5 %
NRBC BLD-RTO: 0 /100 WBCS (ref 0–0)
PLATELET # BLD AUTO: 294 X10*3/UL (ref 150–450)
POTASSIUM SERPL-SCNC: 4.2 MMOL/L (ref 3.5–5.3)
PROT SERPL-MCNC: 7.1 G/DL (ref 6.4–8.2)
RBC # BLD AUTO: 4.64 X10*6/UL (ref 4.5–5.9)
SODIUM SERPL-SCNC: 137 MMOL/L (ref 136–145)
WBC # BLD AUTO: 6.8 X10*3/UL (ref 4.4–11.3)

## 2024-03-12 PROCEDURE — 85025 COMPLETE CBC W/AUTO DIFF WBC: CPT

## 2024-03-12 PROCEDURE — 80053 COMPREHEN METABOLIC PANEL: CPT

## 2024-03-12 PROCEDURE — 82378 CARCINOEMBRYONIC ANTIGEN: CPT

## 2024-03-13 ENCOUNTER — HOSPITAL ENCOUNTER (OUTPATIENT)
Dept: RADIOLOGY | Facility: CLINIC | Age: 69
Discharge: HOME | End: 2024-03-13
Payer: MEDICARE

## 2024-03-13 DIAGNOSIS — C78.7 METASTATIC COLON CANCER TO LIVER (MULTI): ICD-10-CM

## 2024-03-13 DIAGNOSIS — C18.9 MALIGNANT NEOPLASM OF COLON, UNSPECIFIED PART OF COLON (MULTI): ICD-10-CM

## 2024-03-13 DIAGNOSIS — C18.9 METASTATIC COLON CANCER TO LIVER (MULTI): ICD-10-CM

## 2024-03-13 PROCEDURE — 2550000001 HC RX 255 CONTRASTS: Performed by: INTERNAL MEDICINE

## 2024-03-13 PROCEDURE — 74177 CT ABD & PELVIS W/CONTRAST: CPT

## 2024-03-13 PROCEDURE — 71260 CT THORAX DX C+: CPT | Performed by: STUDENT IN AN ORGANIZED HEALTH CARE EDUCATION/TRAINING PROGRAM

## 2024-03-13 PROCEDURE — 74177 CT ABD & PELVIS W/CONTRAST: CPT | Performed by: STUDENT IN AN ORGANIZED HEALTH CARE EDUCATION/TRAINING PROGRAM

## 2024-03-13 RX ADMIN — IOHEXOL 75 ML: 350 INJECTION, SOLUTION INTRAVENOUS at 10:39

## 2024-03-18 ENCOUNTER — INFUSION (OUTPATIENT)
Dept: HEMATOLOGY/ONCOLOGY | Facility: CLINIC | Age: 69
End: 2024-03-18
Payer: MEDICARE

## 2024-03-18 ENCOUNTER — OFFICE VISIT (OUTPATIENT)
Dept: HEMATOLOGY/ONCOLOGY | Facility: CLINIC | Age: 69
End: 2024-03-18
Payer: MEDICARE

## 2024-03-18 VITALS
BODY MASS INDEX: 36.5 KG/M2 | TEMPERATURE: 97.2 F | SYSTOLIC BLOOD PRESSURE: 146 MMHG | WEIGHT: 254.41 LBS | HEART RATE: 93 BPM | DIASTOLIC BLOOD PRESSURE: 81 MMHG | OXYGEN SATURATION: 96 % | RESPIRATION RATE: 18 BRPM

## 2024-03-18 DIAGNOSIS — C78.7 METASTATIC COLON CANCER TO LIVER (MULTI): ICD-10-CM

## 2024-03-18 DIAGNOSIS — C18.9 METASTATIC COLON CANCER TO LIVER (MULTI): ICD-10-CM

## 2024-03-18 DIAGNOSIS — I10 PRIMARY HYPERTENSION: ICD-10-CM

## 2024-03-18 DIAGNOSIS — R97.20 ELEVATED PSA: Primary | ICD-10-CM

## 2024-03-18 DIAGNOSIS — C18.9 MALIGNANT NEOPLASM OF COLON, UNSPECIFIED PART OF COLON (MULTI): ICD-10-CM

## 2024-03-18 DIAGNOSIS — Z79.4 TYPE 2 DIABETES MELLITUS WITH HYPERGLYCEMIA, WITH LONG-TERM CURRENT USE OF INSULIN (MULTI): ICD-10-CM

## 2024-03-18 DIAGNOSIS — E11.65 TYPE 2 DIABETES MELLITUS WITH HYPERGLYCEMIA, WITH LONG-TERM CURRENT USE OF INSULIN (MULTI): ICD-10-CM

## 2024-03-18 PROCEDURE — 1036F TOBACCO NON-USER: CPT | Performed by: INTERNAL MEDICINE

## 2024-03-18 PROCEDURE — 99214 OFFICE O/P EST MOD 30 MIN: CPT | Performed by: INTERNAL MEDICINE

## 2024-03-18 PROCEDURE — 1159F MED LIST DOCD IN RCRD: CPT | Performed by: INTERNAL MEDICINE

## 2024-03-18 PROCEDURE — 3046F HEMOGLOBIN A1C LEVEL >9.0%: CPT | Performed by: INTERNAL MEDICINE

## 2024-03-18 PROCEDURE — 1126F AMNT PAIN NOTED NONE PRSNT: CPT | Performed by: INTERNAL MEDICINE

## 2024-03-18 PROCEDURE — 96523 IRRIG DRUG DELIVERY DEVICE: CPT

## 2024-03-18 PROCEDURE — 4010F ACE/ARB THERAPY RXD/TAKEN: CPT | Performed by: INTERNAL MEDICINE

## 2024-03-18 PROCEDURE — 3077F SYST BP >= 140 MM HG: CPT | Performed by: INTERNAL MEDICINE

## 2024-03-18 PROCEDURE — 3079F DIAST BP 80-89 MM HG: CPT | Performed by: INTERNAL MEDICINE

## 2024-03-18 RX ORDER — HEPARIN SODIUM,PORCINE/PF 10 UNIT/ML
50 SYRINGE (ML) INTRAVENOUS AS NEEDED
OUTPATIENT
Start: 2024-03-18

## 2024-03-18 ASSESSMENT — PAIN SCALES - GENERAL: PAINLEVEL: 0-NO PAIN

## 2024-03-18 NOTE — PROGRESS NOTES
Patient ID: Richard Hicks is a 69 y.o. male.  Referring Physician: Luis Palacios MD  [id number] [facility] [address]  [place] [address]  Primary Care Provider: Anand Adkins MD  Visit Type: Follow Up      Subjective    HPI How was my CT scan?    Review of Systems   Constitutional: Negative.    HENT:  Negative.     Eyes: Negative.    Respiratory: Negative.     Cardiovascular: Negative.    Gastrointestinal: Negative.    Endocrine: Negative.    Genitourinary: Negative.     Musculoskeletal: Negative.    Skin: Negative.    Neurological: Negative.    Hematological: Negative.    Psychiatric/Behavioral: Negative.          Objective   BSA: 2.38 meters squared  /81 (BP Location: Right arm)   Pulse 93   Temp 36.2 °C (97.2 °F) (Temporal)   Resp 18   Wt 115 kg (254 lb 6.6 oz)   SpO2 96%   BMI 36.50 kg/m²      has a past medical history of Anemia, Arthritis, BPH (benign prostatic hyperplasia), GERD (gastroesophageal reflux disease), H/O colon cancer, stage IV (06/2022), Heart valve disease, Hyperlipidemia, Hypertension, Liver lesion, Lung cancer (CMS/HCC), Personal history of other malignant neoplasm of skin, Pneumonitis, Sleep apnea, and Type 2 diabetes mellitus without complications (CMS/HCC) (09/12/2020).   has a past surgical history that includes Tonsillectomy (04/09/2014); Other surgical history (09/30/2021); Other surgical history (09/30/2021); Other surgical history (09/12/2020); CT guided percutaneous biopsy liver (02/24/2022); CT guided percutaneous biopsy lung (08/01/2023); Colonoscopy; and Colon surgery.  Family History   Problem Relation Name Age of Onset    Stroke Father      Heart attack Father      Heart attack Brother       Oncology History   Colon cancer (CMS/HCC)   8/29/2023 - 12/8/2023 Chemotherapy    FOLFIRI (Fluorouracil Continuous Infusion / Leucovorin / Irinotecan), 14 Day Cycles     9/13/2023 Initial Diagnosis    Colon cancer (CMS/HCC)     Metastatic colon cancer to liver (CMS/HCC)  "  8/29/2023 - 12/8/2023 Chemotherapy    FOLFIRI (Fluorouracil Continuous Infusion / Leucovorin / Irinotecan), 14 Day Cycles     9/13/2023 Initial Diagnosis    Metastatic colon cancer to liver (CMS/HCC)         Toby Hicks \"Ed\"  reports that he quit smoking about 36 years ago. His smoking use included cigarettes. He has never used smokeless tobacco.  He  reports that he does not currently use alcohol.  He  reports no history of drug use.    Physical Exam  Vitals reviewed.   Constitutional:       Appearance: Normal appearance.   HENT:      Head: Normocephalic.      Mouth/Throat:      Mouth: Mucous membranes are moist.   Eyes:      Extraocular Movements: Extraocular movements intact.      Pupils: Pupils are equal, round, and reactive to light.   Cardiovascular:      Rate and Rhythm: Regular rhythm.      Heart sounds: Normal heart sounds.      Comments: Chest wall portacath  Pulmonary:      Breath sounds: Normal breath sounds.   Abdominal:      General: Bowel sounds are normal.      Palpations: Abdomen is soft.   Musculoskeletal:         General: Normal range of motion.      Cervical back: Normal range of motion and neck supple.   Skin:     General: Skin is warm.   Neurological:      General: No focal deficit present.      Mental Status: He is alert and oriented to person, place, and time.   Psychiatric:         Mood and Affect: Mood normal.         Behavior: Behavior normal.         WBC   Date/Time Value Ref Range Status   03/12/2024 12:38 PM 6.8 4.4 - 11.3 x10*3/uL Final   01/24/2024 12:44 PM 6.6 4.4 - 11.3 x10*3/uL Final   01/10/2024 07:25 AM 17.2 (H) 4.4 - 11.3 x10*3/uL Final     nRBC   Date Value Ref Range Status   03/12/2024 0.0 0.0 - 0.0 /100 WBCs Final   01/10/2024 0.0 0.0 - 0.0 /100 WBCs Final   01/04/2024 0.0 0.0 - 0.0 /100 WBCs Final     RBC   Date Value Ref Range Status   03/12/2024 4.64 4.50 - 5.90 x10*6/uL Final   01/24/2024 4.46 (L) 4.50 - 5.90 x10*6/uL Final   01/10/2024 4.25 (L) 4.50 - 5.90 " x10*6/uL Final     Hemoglobin   Date Value Ref Range Status   03/12/2024 13.7 13.5 - 17.5 g/dL Final   01/24/2024 13.4 (L) 13.5 - 17.5 g/dL Final   01/10/2024 13.2 (L) 13.5 - 17.5 g/dL Final     Hematocrit   Date Value Ref Range Status   03/12/2024 40.8 (L) 41.0 - 52.0 % Final   01/24/2024 40.3 (L) 41.0 - 52.0 % Final   01/10/2024 39.5 (L) 41.0 - 52.0 % Final     MCV   Date/Time Value Ref Range Status   03/12/2024 12:38 PM 88 80 - 100 fL Final   01/24/2024 12:44 PM 90 80 - 100 fL Final   01/10/2024 07:25 AM 93 80 - 100 fL Final     MCH   Date/Time Value Ref Range Status   03/12/2024 12:38 PM 29.5 26.0 - 34.0 pg Final   01/24/2024 12:44 PM 30.0 26.0 - 34.0 pg Final   01/10/2024 07:25 AM 31.1 26.0 - 34.0 pg Final     MCHC   Date/Time Value Ref Range Status   03/12/2024 12:38 PM 33.6 32.0 - 36.0 g/dL Final   01/24/2024 12:44 PM 33.3 32.0 - 36.0 g/dL Final   01/10/2024 07:25 AM 33.4 32.0 - 36.0 g/dL Final     RDW   Date/Time Value Ref Range Status   03/12/2024 12:38 PM 13.3 11.5 - 14.5 % Final   01/24/2024 12:44 PM 12.3 11.5 - 14.5 % Final   01/10/2024 07:25 AM 12.9 11.5 - 14.5 % Final     Platelets   Date/Time Value Ref Range Status   03/12/2024 12:38  150 - 450 x10*3/uL Final   01/24/2024 12:44  150 - 450 x10*3/uL Final   01/10/2024 07:25  150 - 450 x10*3/uL Final     MPV   Date/Time Value Ref Range Status   10/25/2023 10:35 AM 10.1 7.5 - 11.5 fL Final   10/10/2023 02:18 PM 10.2 7.5 - 11.5 fL Final     Neutrophils %   Date/Time Value Ref Range Status   03/12/2024 12:38 PM 66.5 40.0 - 80.0 % Final   01/24/2024 12:44 PM 63.6 40.0 - 80.0 % Final   12/06/2023 10:06 AM 62.7 40.0 - 80.0 % Final     Immature Granulocytes %, Automated   Date/Time Value Ref Range Status   03/12/2024 12:38 PM 0.3 0.0 - 0.9 % Final     Comment:     Immature Granulocyte Count (IG) includes promyelocytes, myelocytes and metamyelocytes but does not include bands. Percent differential counts (%) should be interpreted in the context  of the absolute cell counts (cells/UL).   01/24/2024 12:44 PM 0.2 0.0 - 0.9 % Final     Comment:     Immature Granulocyte Count (IG) includes promyelocytes, myelocytes and metamyelocytes but does not include bands. Percent differential counts (%) should be interpreted in the context of the absolute cell counts (cells/UL).   12/06/2023 10:06 AM 0.2 0.0 - 0.9 % Final     Comment:     Immature Granulocyte Count (IG) includes promyelocytes, myelocytes and metamyelocytes but does not include bands. Percent differential counts (%) should be interpreted in the context of the absolute cell counts (cells/UL).     Lymphocytes %   Date/Time Value Ref Range Status   03/12/2024 12:38 PM 23.2 13.0 - 44.0 % Final   01/24/2024 12:44 PM 24.0 13.0 - 44.0 % Final   12/06/2023 10:06 AM 23.0 13.0 - 44.0 % Final     Monocytes %   Date/Time Value Ref Range Status   03/12/2024 12:38 PM 7.4 2.0 - 10.0 % Final   01/24/2024 12:44 PM 7.3 2.0 - 10.0 % Final   12/06/2023 10:06 AM 7.8 2.0 - 10.0 % Final     Eosinophils %   Date/Time Value Ref Range Status   03/12/2024 12:38 PM 2.5 0.0 - 6.0 % Final   01/24/2024 12:44 PM 4.4 0.0 - 6.0 % Final   12/06/2023 10:06 AM 6.0 0.0 - 6.0 % Final     Basophils %   Date/Time Value Ref Range Status   03/12/2024 12:38 PM 0.1 0.0 - 2.0 % Final   01/24/2024 12:44 PM 0.5 0.0 - 2.0 % Final   12/06/2023 10:06 AM 0.3 0.0 - 2.0 % Final     Neutrophils Absolute   Date/Time Value Ref Range Status   03/12/2024 12:38 PM 4.51 1.20 - 7.70 x10*3/uL Final     Comment:     Percent differential counts (%) should be interpreted in the context of the absolute cell counts (cells/uL).   01/24/2024 12:44 PM 4.20 1.20 - 7.70 x10*3/uL Final     Comment:     Percent differential counts (%) should be interpreted in the context of the absolute cell counts (cells/uL).   12/06/2023 10:06 AM 3.85 1.20 - 7.70 x10*3/uL Final     Comment:     Percent differential counts (%) should be interpreted in the context of the absolute cell counts  "(cells/uL).     Immature Granulocytes Absolute, Automated   Date/Time Value Ref Range Status   03/12/2024 12:38 PM 0.02 0.00 - 0.70 x10*3/uL Final   01/24/2024 12:44 PM 0.01 0.00 - 0.70 x10*3/uL Final   12/06/2023 10:06 AM 0.01 0.00 - 0.70 x10*3/uL Final     Lymphocytes Absolute   Date/Time Value Ref Range Status   03/12/2024 12:38 PM 1.57 1.20 - 4.80 x10*3/uL Final   01/24/2024 12:44 PM 1.58 1.20 - 4.80 x10*3/uL Final   12/06/2023 10:06 AM 1.41 1.20 - 4.80 x10*3/uL Final     Monocytes Absolute   Date/Time Value Ref Range Status   03/12/2024 12:38 PM 0.50 0.10 - 1.00 x10*3/uL Final   01/24/2024 12:44 PM 0.48 0.10 - 1.00 x10*3/uL Final   12/06/2023 10:06 AM 0.48 0.10 - 1.00 x10*3/uL Final     Eosinophils Absolute   Date/Time Value Ref Range Status   03/12/2024 12:38 PM 0.17 0.00 - 0.70 x10*3/uL Final   01/24/2024 12:44 PM 0.29 0.00 - 0.70 x10*3/uL Final   12/06/2023 10:06 AM 0.37 0.00 - 0.70 x10*3/uL Final     Basophils Absolute   Date/Time Value Ref Range Status   03/12/2024 12:38 PM 0.01 0.00 - 0.10 x10*3/uL Final   01/24/2024 12:44 PM 0.03 0.00 - 0.10 x10*3/uL Final   12/06/2023 10:06 AM 0.02 0.00 - 0.10 x10*3/uL Final       No components found for: \"PT\"  aPTT   Date/Time Value Ref Range Status   06/16/2022 01:53 PM 30 26 - 39 sec Final     Comment:       THE APTT IS NO LONGER USED FOR MONITORING     UNFRACTIONATED HEPARIN THERAPY.    FOR MONITORING HEPARIN THERAPY,     USE THE HEPARIN ASSAY.     04/16/2022 02:41 PM 23 (L) 26 - 39 sec Final     Comment:       THE APTT IS NO LONGER USED FOR MONITORING     UNFRACTIONATED HEPARIN THERAPY.    FOR MONITORING HEPARIN THERAPY,     USE THE HEPARIN ASSAY.         Assessment/Plan    1) stage IV colon cancer  -was diagnosed with liver mets at time of colon cancer diagnosis (transverse colon); FNA of 1.3 cm lesion in segment 1 of liver showed metastatic adenocarcinoma  -he received neoadjuvant FOLFOX, then had laparoscopic hemicolectomy, Dr Preciado performed microwave ablation " of liver lesion  -then completed FOLFOX (12 cycles total)  -unfortunately earlier this year relapsed in his lungs  -s/p FOLFIRI x 8 cycles  -has been having worsening cumulative confusion  -12/11/2023 CT scan--ablation zone in liver is stable, no new liver mets; no new lung nodules; left lower lobe lung nodule (bx proven to be metastatic colon cancer) is unchanged  -will give him a break from systemic therapy  -on 1/9/2024 he was taken to the OR by Dr Mcgill for left VATS wedge resection of LLL  -path showed metastatic adenocarcinoma morphologically consistent with metastasis from pt's known colonic adenocarcinoma; 1.1 cm; no pleural invasion; parenchymal resection margin free of tumor  -labs done on 3/12/2024 included CBC, COMP, CEA  -results reviewed--wbc 6.8, hgb 13.7, plt 294,000, creatinine 1.17, AST 23, ALT 31, CEA 2.8  -CT done on 3/13/2024 reviewed: bilateral subpleural reticular shadowing and fibrotic changes, status post left lower lobe wedge resection with no gross soft tissue masses along the staple line; no new suspicious pulmonary nodules or masses;  no mediastinal, hilar or axillary lymphadenopathy; stable caudate lobe partially exophytic lesion 1.7 cm; s/p right hemicolectomy with ileocolonic anastomosis ; no enlarged intra abdominopelvic lymphadenopathy  -port was accessed today and flushed  -will maintain his port Q8 weeks  -will follow him very closely for now as he has relapsed in both liver and lung, next CT scan due in 6 months     2) elevated PSA  -last PSA was 6.98 (1/16/2023)     3) diabetes  -on metformin  -on glimepiride  -on insulin lantus     4) hypertension  -on losartan        Problem List Items Addressed This Visit             ICD-10-CM    Colon cancer (CMS/HCC) C18.9    Relevant Orders    Infusion Appointment Request [facility] INFUSION (port flush only. Please call patient to schedule after his FUV with Dr. Palacios) (Completed)    Infusion Appointment Request [facility] INFUSION     Infusion Appointment Request [facility] INFUSION    Clinic Appointment Request Chemo Follow Up; LUIS PALACIOS; [facility]    CBC and Auto Differential    Comprehensive metabolic panel    CEA    CT chest abdomen pelvis w and wo IV contrast    Metastatic colon cancer to liver (CMS/HCC) C18.9, C78.7    Relevant Orders    Infusion Appointment Request [facility] INFUSION    Infusion Appointment Request [facility] INFUSION    Clinic Appointment Request Chemo Follow Up; LUIS PALACIOS; [facility]    CBC and Auto Differential    Comprehensive metabolic panel    CEA    CT chest abdomen pelvis w and wo IV contrast            Luis Palacios MD

## 2024-03-23 ASSESSMENT — ENCOUNTER SYMPTOMS
RESPIRATORY NEGATIVE: 1
GASTROINTESTINAL NEGATIVE: 1
MUSCULOSKELETAL NEGATIVE: 1
ENDOCRINE NEGATIVE: 1
PSYCHIATRIC NEGATIVE: 1
CARDIOVASCULAR NEGATIVE: 1
HEMATOLOGIC/LYMPHATIC NEGATIVE: 1
EYES NEGATIVE: 1
NEUROLOGICAL NEGATIVE: 1
CONSTITUTIONAL NEGATIVE: 1

## 2024-05-13 ENCOUNTER — INFUSION (OUTPATIENT)
Dept: HEMATOLOGY/ONCOLOGY | Facility: CLINIC | Age: 69
End: 2024-05-13
Payer: MEDICARE

## 2024-05-13 DIAGNOSIS — C18.9 METASTATIC COLON CANCER TO LIVER (MULTI): ICD-10-CM

## 2024-05-13 DIAGNOSIS — C18.9 MALIGNANT NEOPLASM OF COLON, UNSPECIFIED PART OF COLON (MULTI): ICD-10-CM

## 2024-05-13 DIAGNOSIS — C78.7 METASTATIC COLON CANCER TO LIVER (MULTI): ICD-10-CM

## 2024-05-13 PROCEDURE — 96523 IRRIG DRUG DELIVERY DEVICE: CPT

## 2024-07-08 ENCOUNTER — INFUSION (OUTPATIENT)
Dept: HEMATOLOGY/ONCOLOGY | Facility: CLINIC | Age: 69
End: 2024-07-08
Payer: MEDICARE

## 2024-07-08 DIAGNOSIS — C18.9 METASTATIC COLON CANCER TO LIVER (MULTI): ICD-10-CM

## 2024-07-08 DIAGNOSIS — C78.7 METASTATIC COLON CANCER TO LIVER (MULTI): ICD-10-CM

## 2024-07-08 DIAGNOSIS — C18.9 MALIGNANT NEOPLASM OF COLON, UNSPECIFIED PART OF COLON (MULTI): ICD-10-CM

## 2024-07-08 PROCEDURE — 96523 IRRIG DRUG DELIVERY DEVICE: CPT

## 2024-07-08 RX ORDER — HEPARIN SODIUM,PORCINE/PF 10 UNIT/ML
50 SYRINGE (ML) INTRAVENOUS AS NEEDED
OUTPATIENT
Start: 2024-07-08

## 2024-09-10 ENCOUNTER — LAB (OUTPATIENT)
Dept: LAB | Facility: LAB | Age: 69
End: 2024-09-10
Payer: MEDICARE

## 2024-09-10 DIAGNOSIS — C78.7 METASTATIC COLON CANCER TO LIVER (MULTI): ICD-10-CM

## 2024-09-10 DIAGNOSIS — C18.9 METASTATIC COLON CANCER TO LIVER (MULTI): ICD-10-CM

## 2024-09-10 DIAGNOSIS — C18.9 MALIGNANT NEOPLASM OF COLON, UNSPECIFIED PART OF COLON (MULTI): ICD-10-CM

## 2024-09-10 LAB
ALBUMIN SERPL BCP-MCNC: 4.2 G/DL (ref 3.4–5)
ALP SERPL-CCNC: 71 U/L (ref 33–136)
ALT SERPL W P-5'-P-CCNC: 22 U/L (ref 10–52)
ANION GAP SERPL CALC-SCNC: 12 MMOL/L (ref 10–20)
AST SERPL W P-5'-P-CCNC: 14 U/L (ref 9–39)
BASOPHILS # BLD AUTO: 0.03 X10*3/UL (ref 0–0.1)
BASOPHILS NFR BLD AUTO: 0.4 %
BILIRUB SERPL-MCNC: 0.5 MG/DL (ref 0–1.2)
BUN SERPL-MCNC: 19 MG/DL (ref 6–23)
CALCIUM SERPL-MCNC: 9.4 MG/DL (ref 8.6–10.3)
CHLORIDE SERPL-SCNC: 105 MMOL/L (ref 98–107)
CO2 SERPL-SCNC: 26 MMOL/L (ref 21–32)
CREAT SERPL-MCNC: 1.15 MG/DL (ref 0.5–1.3)
EGFRCR SERPLBLD CKD-EPI 2021: 69 ML/MIN/1.73M*2
EOSINOPHIL # BLD AUTO: 0.24 X10*3/UL (ref 0–0.7)
EOSINOPHIL NFR BLD AUTO: 2.8 %
ERYTHROCYTE [DISTWIDTH] IN BLOOD BY AUTOMATED COUNT: 13.5 % (ref 11.5–14.5)
GLUCOSE SERPL-MCNC: 274 MG/DL (ref 74–99)
HCT VFR BLD AUTO: 41.5 % (ref 41–52)
HGB BLD-MCNC: 13.5 G/DL (ref 13.5–17.5)
IMM GRANULOCYTES # BLD AUTO: 0.04 X10*3/UL (ref 0–0.7)
IMM GRANULOCYTES NFR BLD AUTO: 0.5 % (ref 0–0.9)
LYMPHOCYTES # BLD AUTO: 1.7 X10*3/UL (ref 1.2–4.8)
LYMPHOCYTES NFR BLD AUTO: 19.8 %
MCH RBC QN AUTO: 28.8 PG (ref 26–34)
MCHC RBC AUTO-ENTMCNC: 32.5 G/DL (ref 32–36)
MCV RBC AUTO: 89 FL (ref 80–100)
MONOCYTES # BLD AUTO: 0.51 X10*3/UL (ref 0.1–1)
MONOCYTES NFR BLD AUTO: 6 %
NEUTROPHILS # BLD AUTO: 6.05 X10*3/UL (ref 1.2–7.7)
NEUTROPHILS NFR BLD AUTO: 70.5 %
NRBC BLD-RTO: 0 /100 WBCS (ref 0–0)
PLATELET # BLD AUTO: 281 X10*3/UL (ref 150–450)
POTASSIUM SERPL-SCNC: 4.4 MMOL/L (ref 3.5–5.3)
PROT SERPL-MCNC: 6.8 G/DL (ref 6.4–8.2)
RBC # BLD AUTO: 4.68 X10*6/UL (ref 4.5–5.9)
SODIUM SERPL-SCNC: 139 MMOL/L (ref 136–145)
WBC # BLD AUTO: 8.6 X10*3/UL (ref 4.4–11.3)

## 2024-09-10 PROCEDURE — 82378 CARCINOEMBRYONIC ANTIGEN: CPT

## 2024-09-10 PROCEDURE — 85025 COMPLETE CBC W/AUTO DIFF WBC: CPT

## 2024-09-10 PROCEDURE — 80053 COMPREHEN METABOLIC PANEL: CPT

## 2024-09-11 ENCOUNTER — HOSPITAL ENCOUNTER (OUTPATIENT)
Dept: RADIOLOGY | Facility: CLINIC | Age: 69
Discharge: HOME | End: 2024-09-11
Payer: MEDICARE

## 2024-09-11 DIAGNOSIS — C78.7 METASTATIC COLON CANCER TO LIVER (MULTI): ICD-10-CM

## 2024-09-11 DIAGNOSIS — C18.9 MALIGNANT NEOPLASM OF COLON, UNSPECIFIED PART OF COLON (MULTI): ICD-10-CM

## 2024-09-11 DIAGNOSIS — C18.9 METASTATIC COLON CANCER TO LIVER (MULTI): ICD-10-CM

## 2024-09-11 LAB — CEA SERPL-MCNC: 2.4 UG/L

## 2024-09-11 PROCEDURE — 71260 CT THORAX DX C+: CPT | Performed by: STUDENT IN AN ORGANIZED HEALTH CARE EDUCATION/TRAINING PROGRAM

## 2024-09-11 PROCEDURE — 74177 CT ABD & PELVIS W/CONTRAST: CPT | Performed by: STUDENT IN AN ORGANIZED HEALTH CARE EDUCATION/TRAINING PROGRAM

## 2024-09-11 PROCEDURE — 2550000001 HC RX 255 CONTRASTS: Performed by: INTERNAL MEDICINE

## 2024-09-11 PROCEDURE — 74177 CT ABD & PELVIS W/CONTRAST: CPT

## 2024-09-16 ENCOUNTER — INFUSION (OUTPATIENT)
Dept: HEMATOLOGY/ONCOLOGY | Facility: CLINIC | Age: 69
End: 2024-09-16
Payer: MEDICARE

## 2024-09-16 ENCOUNTER — OFFICE VISIT (OUTPATIENT)
Dept: HEMATOLOGY/ONCOLOGY | Facility: CLINIC | Age: 69
End: 2024-09-16
Payer: MEDICARE

## 2024-09-16 VITALS
HEART RATE: 79 BPM | WEIGHT: 245.81 LBS | DIASTOLIC BLOOD PRESSURE: 76 MMHG | OXYGEN SATURATION: 96 % | SYSTOLIC BLOOD PRESSURE: 134 MMHG | RESPIRATION RATE: 18 BRPM | TEMPERATURE: 97.7 F | BODY MASS INDEX: 35.27 KG/M2

## 2024-09-16 DIAGNOSIS — C78.7 METASTATIC COLON CANCER TO LIVER (MULTI): ICD-10-CM

## 2024-09-16 DIAGNOSIS — C18.9 MALIGNANT NEOPLASM OF COLON, UNSPECIFIED PART OF COLON (MULTI): ICD-10-CM

## 2024-09-16 DIAGNOSIS — Z79.4 TYPE 2 DIABETES MELLITUS WITH HYPERGLYCEMIA, WITH LONG-TERM CURRENT USE OF INSULIN: ICD-10-CM

## 2024-09-16 DIAGNOSIS — E11.65 TYPE 2 DIABETES MELLITUS WITH HYPERGLYCEMIA, WITH LONG-TERM CURRENT USE OF INSULIN: ICD-10-CM

## 2024-09-16 DIAGNOSIS — I10 PRIMARY HYPERTENSION: ICD-10-CM

## 2024-09-16 DIAGNOSIS — R97.20 ELEVATED PSA: ICD-10-CM

## 2024-09-16 DIAGNOSIS — C18.9 METASTATIC COLON CANCER TO LIVER (MULTI): ICD-10-CM

## 2024-09-16 DIAGNOSIS — C18.9 MALIGNANT NEOPLASM OF COLON, UNSPECIFIED PART OF COLON (MULTI): Primary | ICD-10-CM

## 2024-09-16 PROCEDURE — 3075F SYST BP GE 130 - 139MM HG: CPT | Performed by: INTERNAL MEDICINE

## 2024-09-16 PROCEDURE — 99214 OFFICE O/P EST MOD 30 MIN: CPT | Performed by: INTERNAL MEDICINE

## 2024-09-16 PROCEDURE — 1123F ACP DISCUSS/DSCN MKR DOCD: CPT | Performed by: INTERNAL MEDICINE

## 2024-09-16 PROCEDURE — 3046F HEMOGLOBIN A1C LEVEL >9.0%: CPT | Performed by: INTERNAL MEDICINE

## 2024-09-16 PROCEDURE — 96523 IRRIG DRUG DELIVERY DEVICE: CPT

## 2024-09-16 PROCEDURE — 1159F MED LIST DOCD IN RCRD: CPT | Performed by: INTERNAL MEDICINE

## 2024-09-16 PROCEDURE — 36591 DRAW BLOOD OFF VENOUS DEVICE: CPT

## 2024-09-16 PROCEDURE — 1126F AMNT PAIN NOTED NONE PRSNT: CPT | Performed by: INTERNAL MEDICINE

## 2024-09-16 PROCEDURE — 4010F ACE/ARB THERAPY RXD/TAKEN: CPT | Performed by: INTERNAL MEDICINE

## 2024-09-16 PROCEDURE — 3078F DIAST BP <80 MM HG: CPT | Performed by: INTERNAL MEDICINE

## 2024-09-16 RX ORDER — HEPARIN SODIUM,PORCINE/PF 10 UNIT/ML
50 SYRINGE (ML) INTRAVENOUS AS NEEDED
OUTPATIENT
Start: 2024-09-16

## 2024-09-16 ASSESSMENT — ENCOUNTER SYMPTOMS
GASTROINTESTINAL NEGATIVE: 1
RESPIRATORY NEGATIVE: 1
HEMATOLOGIC/LYMPHATIC NEGATIVE: 1
ENDOCRINE NEGATIVE: 1
CONSTITUTIONAL NEGATIVE: 1
PSYCHIATRIC NEGATIVE: 1
NEUROLOGICAL NEGATIVE: 1
MUSCULOSKELETAL NEGATIVE: 1
EYES NEGATIVE: 1
CARDIOVASCULAR NEGATIVE: 1

## 2024-09-16 ASSESSMENT — PAIN SCALES - GENERAL: PAINLEVEL: 0-NO PAIN

## 2024-09-16 NOTE — PROGRESS NOTES
Patient ID: Richard Hicks is a 69 y.o. male.  Referring Physician: Luis Palacios MD  [address]  [place] [address]  Primary Care Provider: Anand Adkins MD  Visit Type: Follow Up      Subjective    HPI How was my CT scan?    Review of Systems   Constitutional: Negative.    HENT:  Negative.     Eyes: Negative.    Respiratory: Negative.     Cardiovascular: Negative.    Gastrointestinal: Negative.    Endocrine: Negative.    Genitourinary: Negative.     Musculoskeletal: Negative.    Skin: Negative.    Neurological: Negative.    Hematological: Negative.    Psychiatric/Behavioral: Negative.          Objective   BSA: 2.35 meters squared  /76 (BP Location: Right arm)   Pulse 79   Temp 36.5 °C (97.7 °F) (Temporal)   Resp 18   Wt 112 kg (245 lb 13 oz)   SpO2 96%   BMI 35.27 kg/m²      has a past medical history of Anemia, Arthritis, BPH (benign prostatic hyperplasia), GERD (gastroesophageal reflux disease), H/O colon cancer, stage IV (06/2022), Heart valve disease, Hyperlipidemia, Hypertension, Liver lesion, Lung cancer (Multi), Personal history of other malignant neoplasm of skin, Pneumonitis, Sleep apnea, and Type 2 diabetes mellitus without complications (Multi) (09/12/2020).   has a past surgical history that includes Tonsillectomy (04/09/2014); Other surgical history (09/30/2021); Other surgical history (09/30/2021); Other surgical history (09/12/2020); CT guided percutaneous biopsy liver (02/24/2022); CT guided percutaneous biopsy lung (08/01/2023); Colonoscopy; and Colon surgery.  Family History   Problem Relation Name Age of Onset    Stroke Father      Heart attack Father      Heart attack Brother       Oncology History   Colon cancer (Multi)   8/29/2023 - 12/8/2023 Chemotherapy    FOLFIRI (Fluorouracil Continuous Infusion / Leucovorin / Irinotecan), 14 Day Cycles     9/13/2023 Initial Diagnosis    Colon cancer (CMS/HCC)     Metastatic colon cancer to liver (Multi)   8/29/2023  "- 12/8/2023 Chemotherapy    FOLFIRI (Fluorouracil Continuous Infusion / Leucovorin / Irinotecan), 14 Day Cycles     9/13/2023 Initial Diagnosis    Metastatic colon cancer to liver (CMS/HCC)         Toby Hicks \"Ed\"  reports that he quit smoking about 36 years ago. His smoking use included cigarettes. He has never used smokeless tobacco.  He  reports that he does not currently use alcohol.  He  reports no history of drug use.    Physical Exam  Vitals reviewed.   Constitutional:       Appearance: Normal appearance.   HENT:      Head: Normocephalic.      Mouth/Throat:      Mouth: Mucous membranes are moist.   Eyes:      Extraocular Movements: Extraocular movements intact.      Pupils: Pupils are equal, round, and reactive to light.   Cardiovascular:      Rate and Rhythm: Normal rate and regular rhythm.      Pulses: Normal pulses.      Heart sounds: Normal heart sounds.      Comments: Chest wall portacath  Pulmonary:      Effort: Pulmonary effort is normal.      Breath sounds: Normal breath sounds.   Abdominal:      General: Bowel sounds are normal.      Palpations: Abdomen is soft.   Musculoskeletal:         General: Normal range of motion.      Cervical back: Normal range of motion and neck supple.   Skin:     General: Skin is warm.   Neurological:      General: No focal deficit present.      Mental Status: He is alert and oriented to person, place, and time.   Psychiatric:         Mood and Affect: Mood normal.         Behavior: Behavior normal.         WBC   Date/Time Value Ref Range Status   09/10/2024 12:23 PM 8.6 4.4 - 11.3 x10*3/uL Final   03/12/2024 12:38 PM 6.8 4.4 - 11.3 x10*3/uL Final   01/24/2024 12:44 PM 6.6 4.4 - 11.3 x10*3/uL Final     nRBC   Date Value Ref Range Status   09/10/2024 0.0 0.0 - 0.0 /100 WBCs Final   03/12/2024 0.0 0.0 - 0.0 /100 WBCs Final   01/10/2024 0.0 0.0 - 0.0 /100 WBCs Final     RBC   Date Value Ref Range Status   09/10/2024 4.68 4.50 - 5.90 x10*6/uL Final   03/12/2024 4.64 " 4.50 - 5.90 x10*6/uL Final   01/24/2024 4.46 (L) 4.50 - 5.90 x10*6/uL Final     Hemoglobin   Date Value Ref Range Status   09/10/2024 13.5 13.5 - 17.5 g/dL Final   03/12/2024 13.7 13.5 - 17.5 g/dL Final   01/24/2024 13.4 (L) 13.5 - 17.5 g/dL Final     Hematocrit   Date Value Ref Range Status   09/10/2024 41.5 41.0 - 52.0 % Final   03/12/2024 40.8 (L) 41.0 - 52.0 % Final   01/24/2024 40.3 (L) 41.0 - 52.0 % Final     MCV   Date/Time Value Ref Range Status   09/10/2024 12:23 PM 89 80 - 100 fL Final   03/12/2024 12:38 PM 88 80 - 100 fL Final   01/24/2024 12:44 PM 90 80 - 100 fL Final     MCH   Date/Time Value Ref Range Status   09/10/2024 12:23 PM 28.8 26.0 - 34.0 pg Final   03/12/2024 12:38 PM 29.5 26.0 - 34.0 pg Final   01/24/2024 12:44 PM 30.0 26.0 - 34.0 pg Final     MCHC   Date/Time Value Ref Range Status   09/10/2024 12:23 PM 32.5 32.0 - 36.0 g/dL Final   03/12/2024 12:38 PM 33.6 32.0 - 36.0 g/dL Final   01/24/2024 12:44 PM 33.3 32.0 - 36.0 g/dL Final     RDW   Date/Time Value Ref Range Status   09/10/2024 12:23 PM 13.5 11.5 - 14.5 % Final   03/12/2024 12:38 PM 13.3 11.5 - 14.5 % Final   01/24/2024 12:44 PM 12.3 11.5 - 14.5 % Final     Platelets   Date/Time Value Ref Range Status   09/10/2024 12:23  150 - 450 x10*3/uL Final   03/12/2024 12:38  150 - 450 x10*3/uL Final   01/24/2024 12:44  150 - 450 x10*3/uL Final     MPV   Date/Time Value Ref Range Status   10/25/2023 10:35 AM 10.1 7.5 - 11.5 fL Final   10/10/2023 02:18 PM 10.2 7.5 - 11.5 fL Final     Neutrophils %   Date/Time Value Ref Range Status   09/10/2024 12:23 PM 70.5 40.0 - 80.0 % Final   03/12/2024 12:38 PM 66.5 40.0 - 80.0 % Final   01/24/2024 12:44 PM 63.6 40.0 - 80.0 % Final     Immature Granulocytes %, Automated   Date/Time Value Ref Range Status   09/10/2024 12:23 PM 0.5 0.0 - 0.9 % Final     Comment:     Immature Granulocyte Count (IG) includes promyelocytes, myelocytes and metamyelocytes but does not include bands. Percent  differential counts (%) should be interpreted in the context of the absolute cell counts (cells/UL).   03/12/2024 12:38 PM 0.3 0.0 - 0.9 % Final     Comment:     Immature Granulocyte Count (IG) includes promyelocytes, myelocytes and metamyelocytes but does not include bands. Percent differential counts (%) should be interpreted in the context of the absolute cell counts (cells/UL).   01/24/2024 12:44 PM 0.2 0.0 - 0.9 % Final     Comment:     Immature Granulocyte Count (IG) includes promyelocytes, myelocytes and metamyelocytes but does not include bands. Percent differential counts (%) should be interpreted in the context of the absolute cell counts (cells/UL).     Lymphocytes %   Date/Time Value Ref Range Status   09/10/2024 12:23 PM 19.8 13.0 - 44.0 % Final   03/12/2024 12:38 PM 23.2 13.0 - 44.0 % Final   01/24/2024 12:44 PM 24.0 13.0 - 44.0 % Final     Monocytes %   Date/Time Value Ref Range Status   09/10/2024 12:23 PM 6.0 2.0 - 10.0 % Final   03/12/2024 12:38 PM 7.4 2.0 - 10.0 % Final   01/24/2024 12:44 PM 7.3 2.0 - 10.0 % Final     Eosinophils %   Date/Time Value Ref Range Status   09/10/2024 12:23 PM 2.8 0.0 - 6.0 % Final   03/12/2024 12:38 PM 2.5 0.0 - 6.0 % Final   01/24/2024 12:44 PM 4.4 0.0 - 6.0 % Final     Basophils %   Date/Time Value Ref Range Status   09/10/2024 12:23 PM 0.4 0.0 - 2.0 % Final   03/12/2024 12:38 PM 0.1 0.0 - 2.0 % Final   01/24/2024 12:44 PM 0.5 0.0 - 2.0 % Final     Neutrophils Absolute   Date/Time Value Ref Range Status   09/10/2024 12:23 PM 6.05 1.20 - 7.70 x10*3/uL Final     Comment:     Percent differential counts (%) should be interpreted in the context of the absolute cell counts (cells/uL).   03/12/2024 12:38 PM 4.51 1.20 - 7.70 x10*3/uL Final     Comment:     Percent differential counts (%) should be interpreted in the context of the absolute cell counts (cells/uL).   01/24/2024 12:44 PM 4.20 1.20 - 7.70 x10*3/uL Final     Comment:     Percent differential counts (%) should be  "interpreted in the context of the absolute cell counts (cells/uL).     Immature Granulocytes Absolute, Automated   Date/Time Value Ref Range Status   09/10/2024 12:23 PM 0.04 0.00 - 0.70 x10*3/uL Final   03/12/2024 12:38 PM 0.02 0.00 - 0.70 x10*3/uL Final   01/24/2024 12:44 PM 0.01 0.00 - 0.70 x10*3/uL Final     Lymphocytes Absolute   Date/Time Value Ref Range Status   09/10/2024 12:23 PM 1.70 1.20 - 4.80 x10*3/uL Final   03/12/2024 12:38 PM 1.57 1.20 - 4.80 x10*3/uL Final   01/24/2024 12:44 PM 1.58 1.20 - 4.80 x10*3/uL Final     Monocytes Absolute   Date/Time Value Ref Range Status   09/10/2024 12:23 PM 0.51 0.10 - 1.00 x10*3/uL Final   03/12/2024 12:38 PM 0.50 0.10 - 1.00 x10*3/uL Final   01/24/2024 12:44 PM 0.48 0.10 - 1.00 x10*3/uL Final     Eosinophils Absolute   Date/Time Value Ref Range Status   09/10/2024 12:23 PM 0.24 0.00 - 0.70 x10*3/uL Final   03/12/2024 12:38 PM 0.17 0.00 - 0.70 x10*3/uL Final   01/24/2024 12:44 PM 0.29 0.00 - 0.70 x10*3/uL Final     Basophils Absolute   Date/Time Value Ref Range Status   09/10/2024 12:23 PM 0.03 0.00 - 0.10 x10*3/uL Final   03/12/2024 12:38 PM 0.01 0.00 - 0.10 x10*3/uL Final   01/24/2024 12:44 PM 0.03 0.00 - 0.10 x10*3/uL Final       No components found for: \"PT\"  aPTT   Date/Time Value Ref Range Status   06/16/2022 01:53 PM 30 26 - 39 sec Final     Comment:       THE APTT IS NO LONGER USED FOR MONITORING     UNFRACTIONATED HEPARIN THERAPY.    FOR MONITORING HEPARIN THERAPY,     USE THE HEPARIN ASSAY.     04/16/2022 02:41 PM 23 (L) 26 - 39 sec Final     Comment:       THE APTT IS NO LONGER USED FOR MONITORING     UNFRACTIONATED HEPARIN THERAPY.    FOR MONITORING HEPARIN THERAPY,     USE THE HEPARIN ASSAY.       Medication Documentation Review Audit       Reviewed by Fatou De MA (Medical Assistant) on 09/16/24 at 1030      Medication Order Taking? Sig Documenting Provider Last Dose Status   acetaminophen (Tylenol) 325 mg tablet [id number] Yes Take 2 tablets (650 mg) " by mouth every 4 hours. UMU Meza Taking Active   blood sugar diagnostic (OneTouch Verio test strips) strip [id number] Yes TEST BID Anand Adkins MD Taking Active   cholecalciferol (Vitamin D-3) 25 MCG (1000 UT) capsule [id number] Yes Take 1 capsule (25 mcg) by mouth once daily. Historical Provider, MD Taking Active   flaxseed oiL 1,000 mg capsule [id number] Yes Take 1 capsule (1,000 mg) by mouth once daily. Historical Provider, MD Taking Active   glimepiride (Amaryl) 4 mg tablet [id number] Yes TAKE 2 TABLETS BY MOUTH ONCE  DAILY IN THE MORNING TAKE BEFORE MEALS Anand Adkins MD Taking Active   insulin glargine (Lantus Solostar U-100 Insulin) 100 unit/mL (3 mL) pen [id number] Yes INJECT SUBCUTANEOUSLY 40 UNITS  AT BEDTIME Anand Adkins MD Taking Active   losartan (Cozaar) 50 mg tablet [id number] Yes TAKE 2 TABLETS BY MOUTH ONCE  DAILY Anand Adkins MD Taking Active   metFORMIN  mg 24 hr tablet [id number] Yes TAKE 2 TABLETS BY MOUTH TWICE  DAILY Anand Adkins MD Taking Active   omega-3 fatty acids-fish oil (Fish OiL) 340-1,000 mg capsule [id number] Yes Take 1 capsule by mouth once daily. Historical Provider, MD Taking Active   oxyCODONE (Roxicodone) 5 mg immediate release tablet [id number] No Take 1 tablet (5 mg) by mouth every 6 hours if needed (pain).   Patient not taking: Reported on 1/25/2024    NICOLE Meza-CNP Not Taking Active   zinc sulfate (Zincate) 220 (50 Zn) MG capsule [id number] Yes Take 1 capsule (50 mg of elemental zinc) by mouth once daily. Historical Provider, MD Taking Active                   Assessment/Plan    1) stage IV colon cancer  -was diagnosed with liver mets at time of colon cancer diagnosis (transverse colon); FNA of 1.3 cm lesion in segment 1 of liver showed metastatic adenocarcinoma  -he received neoadjuvant FOLFOX, then had laparoscopic hemicolectomy, Dr Preciado performed microwave ablation of liver lesion  -then completed FOLFOX (12  cycles total)  -unfortunately earlier this year relapsed in his lungs  -s/p FOLFIRI x 8 cycles  -has been having worsening cumulative confusion  -12/11/2023 CT scan--ablation zone in liver is stable, no new liver mets; no new lung nodules; left lower lobe lung nodule (bx proven to be metastatic colon cancer) is unchanged  -will give him a break from systemic therapy  -on 1/9/2024 he was taken to the OR by Dr Mcgill for left VATS wedge resection of LLL  -path showed metastatic adenocarcinoma morphologically consistent with metastasis from pt's known colonic adenocarcinoma; 1.1 cm; no pleural invasion; parenchymal resection margin free of tumor  -CT done on 3/13/2024 reviewed: bilateral subpleural reticular shadowing and fibrotic changes, status post left lower lobe wedge resection with no gross soft tissue masses along the staple line; no new suspicious pulmonary nodules or masses;  no mediastinal, hilar or axillary lymphadenopathy; stable caudate lobe partially exophytic lesion 1.7 cm; s/p right hemicolectomy with ileocolonic anastomosis ; no enlarged intra abdominopelvic lymphadenopathy  -port was accessed today and flushed  -labs done on 9/10/2024 included CBC, COMP, CEA  -results reviewed--wbc 8.6, hgb 13.5, plt 281,000, creatinine 1.15, calcium 9.4, alk phos 71, AST 14, ALT 22, CEA 2.4  -CT body done on 9/11/2024 reviewed--bilateral subpleural reticular shadowing and fibrotic changes; status post left lower lobe wedge resection with no gross soft tissue masses along staple line; no new suspicious pulmonary nodules or masses; no mediastinal, hilar or axillary lymphadenopathy is present; diffuse hepatic steatosis; stable caudate lobe partially exophytic lesion 1.7 cm; spleen is normal size without focal lesions; status post right hemicolectomy with ileocolonic anastomosis with no gross soft tissue masses at the surgical bed; no bowel dilatation  -will maintain his port Q8 weeks  -will follow him very closely for  now as he has relapsed in both liver and lung, next CT scan due in 6 months  -complains of excessive sleeping daily/nightly--this has been going on for years--he could go to bed at 9 or 10 PM and easily sleep through noon the next day; still has to take an afternoon nap too     2) elevated PSA  -last PSA was 6.98 (1/16/2023)     3) diabetes  -on metformin  -on glimepiride  -on insulin lantus     4) hypertension  -on losartan     Problem List Items Addressed This Visit             ICD-10-CM    Colon cancer (Multi) - Primary C18.9    Relevant Orders    Infusion Appointment Request [facility]    Infusion Appointment Request [facility]    [facility]    Clinic Appointment Request Follow Up; LUIS PALACIOS; [facility]    CBC and Auto Differential    Comprehensive metabolic panel    CEA    CT chest abdomen pelvis w IV contrast    Metastatic colon cancer to liver (Multi) C18.9, C78.7    Relevant Orders    Infusion Appointment Request [facility]    Infusion Appointment Request [facility]    [facility]    Clinic Appointment Request Follow Up; LUIS PALACIOS; [facility]    CBC and Auto Differential    Comprehensive metabolic panel    CEA    CT chest abdomen pelvis w IV contrast            Luis Palacios MD

## 2024-10-28 RX ORDER — HEPARIN SODIUM,PORCINE/PF 10 UNIT/ML
50 SYRINGE (ML) INTRAVENOUS AS NEEDED
OUTPATIENT
Start: 2024-10-28

## 2024-11-11 ENCOUNTER — INFUSION (OUTPATIENT)
Dept: HEMATOLOGY/ONCOLOGY | Facility: CLINIC | Age: 69
End: 2024-11-11
Payer: MEDICARE

## 2024-11-11 DIAGNOSIS — C78.7 METASTATIC COLON CANCER TO LIVER (MULTI): ICD-10-CM

## 2024-11-11 DIAGNOSIS — C18.9 MALIGNANT NEOPLASM OF COLON, UNSPECIFIED PART OF COLON (MULTI): ICD-10-CM

## 2024-11-11 DIAGNOSIS — C18.9 METASTATIC COLON CANCER TO LIVER (MULTI): ICD-10-CM

## 2024-11-11 PROCEDURE — 36591 DRAW BLOOD OFF VENOUS DEVICE: CPT

## 2024-11-11 PROCEDURE — 96523 IRRIG DRUG DELIVERY DEVICE: CPT

## 2024-12-26 NOTE — PROGRESS NOTES
"Subjective   Patient ID: Toby Hicks \"Ed\" is a 69 y.o. male who presents for Medicare Annual Wellness Visit Subsequent.  HPI    Patient presents in the office today for a Medicare Annual Wellness Visit.  Does not need a form filled out. blood work done 12/28/24. Tries to eat a generally healthy diet. Exercises walks a few times a week.    Patient accepts the flu vaccine today.      Advanced Care Planning  Toby Hicks \"Ed\" and I discussed their advance care plan preferences such as living will, and durable health care power of , which include: no Attempt Resuscitation, no Intubate & Ventilate, no Comfort Care or Life- Sustaning Care. I reminded patient to talk with their health care agent, Kenan Hicks, about their health care goals. Note reflects patient's free will and care goals as expressed to me.         Review of Systems  Constitutional:  no chills, no fever and no night sweats.  Eyes: no blurred vision and no eyesight problems.  ENT: no hearing loss, no nasal congestion, no hoarseness and no sore throat.  Neck: no mass (es) and no swelling.  Cardiovascular: no chest pain, no intermittent leg claudication, no lower extremity edema, no palpitation and no syncope.  Respiratory: no cough, no shortness of breath during exertion, no shortness of breath at rest and no wheezing.  Gastrointestinal: no abdominal pain, no blood in stools, no constipation, no diarrhea, no melena, no nausea, no rectal pain and no vomiting.  Genitourinary: no dysuria, no change in urinary frequency, no urinary hesitancy and no feelings of urinary urgency.  Musculoskeletal: no arthralgias, no back pain and no myalgias.  Integumentary: no new skin lesions and no rashes.  Neurological: no difficulty walking, no headache, no limb weakness, no numbness and no tingling.  Psychiatric/Behavioral: no anxiety, no depression, no anhedonia and no substance use disorders.  Endocrine: no recent weight gain and no recent weight " "loss.  Hematologic/Lymphatic: no tendency for easy bruising and no swollen glands    Objective   Physical Exam  Is noted for annual Medicare wellness exam with history of diabetes reviewed with him his lab work shows his A1c to continue to go up 11.9 has not been taking his Lantus every night as directed advised he is got to do this every day he has to be consistent with this medication and what makes it very difficult to decide what needs to be done.  Denies chest pain or shortness of breath he has chronic right knee pain arthritis overall no new complaints.  Obese male in no acute distress physical exam today's office visit constitutional alert and oriented x3.    Head is atraumatic HEENT is within normal limits.    Neck supple no masses full range of motion.    Thyroid is normal in size no thyromegaly there is no carotid bruits.    Pulmonary exam shows clear to auscultation no respiratory distress.    Cardiovascular shows no murmur rub or gallop.  Regular rate and rhythm.    Abdominal exam soft nontender no hepatosplenomegaly or masses normal bowel sounds no rebound no guarding.    Musculoskeletal exam no joint pain no muscle pain full range of motion.    Psych exam normal mood and affect.    Dermatologic exam no skin lesions no rash no blemishes.    Neuro exam is no focal deficits.  Normal exam.    Extremities no edema normal pulses normal capillary refill.    Resp 16   Ht 1.778 m (5' 10\")   Wt 110 kg (242 lb 9.6 oz)   BMI 34.81 kg/m²     Lab Results   Component Value Date    WBC 8.6 09/10/2024    HGB 13.5 09/10/2024    HCT 41.5 09/10/2024    MCV 89 09/10/2024     09/10/2024       Assessment/Plan plan make sure he takes all his medication as directed recheck an A1c in 4 months  Problem List Items Addressed This Visit       BPH with obstruction/lower urinary tract symptoms    Diabetes mellitus (Multi)    Hyperlipidemia    Hypertension    Medicare annual wellness visit, subsequent - Primary     "

## 2024-12-27 ENCOUNTER — TELEPHONE (OUTPATIENT)
Dept: PRIMARY CARE | Facility: CLINIC | Age: 69
End: 2024-12-27
Payer: MEDICARE

## 2024-12-27 DIAGNOSIS — R73.9 HYPERGLYCEMIA: Primary | ICD-10-CM

## 2024-12-27 DIAGNOSIS — N40.0 BENIGN PROSTATIC HYPERPLASIA WITHOUT LOWER URINARY TRACT SYMPTOMS: ICD-10-CM

## 2024-12-27 DIAGNOSIS — E78.2 MIXED HYPERLIPIDEMIA: ICD-10-CM

## 2024-12-27 NOTE — TELEPHONE ENCOUNTER
"PLEASE ADVISE    Toby Hicks \"Ed\"  [address] [facility] Clinical Support Staff (supporting Anand Adkins MD)8 hours ago (10:22 PM)       should i have blood work done before monday 12/30 office visit  Ed       "

## 2024-12-28 ENCOUNTER — LAB (OUTPATIENT)
Dept: LAB | Facility: LAB | Age: 69
End: 2024-12-28
Payer: MEDICARE

## 2024-12-28 DIAGNOSIS — R73.9 HYPERGLYCEMIA: ICD-10-CM

## 2024-12-28 DIAGNOSIS — N40.0 BENIGN PROSTATIC HYPERPLASIA WITHOUT LOWER URINARY TRACT SYMPTOMS: ICD-10-CM

## 2024-12-28 DIAGNOSIS — E78.2 MIXED HYPERLIPIDEMIA: ICD-10-CM

## 2024-12-28 LAB
ALBUMIN SERPL BCP-MCNC: 4.1 G/DL (ref 3.4–5)
ALP SERPL-CCNC: 60 U/L (ref 33–136)
ALT SERPL W P-5'-P-CCNC: 16 U/L (ref 10–52)
ANION GAP SERPL CALC-SCNC: 10 MMOL/L (ref 10–20)
AST SERPL W P-5'-P-CCNC: 11 U/L (ref 9–39)
BILIRUB SERPL-MCNC: 0.4 MG/DL (ref 0–1.2)
BUN SERPL-MCNC: 16 MG/DL (ref 6–23)
CALCIUM SERPL-MCNC: 9.5 MG/DL (ref 8.6–10.3)
CHLORIDE SERPL-SCNC: 106 MMOL/L (ref 98–107)
CHOLEST SERPL-MCNC: 255 MG/DL (ref 0–199)
CHOLESTEROL/HDL RATIO: 6.1
CO2 SERPL-SCNC: 28 MMOL/L (ref 21–32)
CREAT SERPL-MCNC: 1.04 MG/DL (ref 0.5–1.3)
EGFRCR SERPLBLD CKD-EPI 2021: 78 ML/MIN/1.73M*2
EST. AVERAGE GLUCOSE BLD GHB EST-MCNC: 295 MG/DL
GLUCOSE SERPL-MCNC: 122 MG/DL (ref 74–99)
HBA1C MFR BLD: 11.9 %
HDLC SERPL-MCNC: 41.8 MG/DL
LDLC SERPL CALC-MCNC: 179 MG/DL
NON HDL CHOLESTEROL: 213 MG/DL (ref 0–149)
POTASSIUM SERPL-SCNC: 3.9 MMOL/L (ref 3.5–5.3)
PROT SERPL-MCNC: 6.6 G/DL (ref 6.4–8.2)
PSA SERPL-MCNC: 9.15 NG/ML
SODIUM SERPL-SCNC: 140 MMOL/L (ref 136–145)
TRIGL SERPL-MCNC: 171 MG/DL (ref 0–149)
VLDL: 34 MG/DL (ref 0–40)

## 2024-12-28 PROCEDURE — 83036 HEMOGLOBIN GLYCOSYLATED A1C: CPT

## 2024-12-28 PROCEDURE — 84153 ASSAY OF PSA TOTAL: CPT

## 2024-12-28 PROCEDURE — 80053 COMPREHEN METABOLIC PANEL: CPT

## 2024-12-28 PROCEDURE — 80061 LIPID PANEL: CPT

## 2024-12-30 ENCOUNTER — APPOINTMENT (OUTPATIENT)
Dept: PRIMARY CARE | Facility: CLINIC | Age: 69
End: 2024-12-30
Payer: MEDICARE

## 2024-12-30 VITALS
TEMPERATURE: 97.9 F | BODY MASS INDEX: 34.73 KG/M2 | HEART RATE: 77 BPM | HEIGHT: 70 IN | OXYGEN SATURATION: 97 % | DIASTOLIC BLOOD PRESSURE: 70 MMHG | WEIGHT: 242.6 LBS | RESPIRATION RATE: 16 BRPM | SYSTOLIC BLOOD PRESSURE: 120 MMHG

## 2024-12-30 DIAGNOSIS — Z00.00 MEDICARE ANNUAL WELLNESS VISIT, SUBSEQUENT: Primary | ICD-10-CM

## 2024-12-30 DIAGNOSIS — E11.9 TYPE 2 DIABETES MELLITUS WITHOUT COMPLICATION, WITHOUT LONG-TERM CURRENT USE OF INSULIN (MULTI): ICD-10-CM

## 2024-12-30 DIAGNOSIS — E11.9 TYPE 2 DIABETES MELLITUS WITHOUT COMPLICATION, WITH LONG-TERM CURRENT USE OF INSULIN (MULTI): ICD-10-CM

## 2024-12-30 DIAGNOSIS — Z23 NEED FOR INFLUENZA VACCINATION: ICD-10-CM

## 2024-12-30 DIAGNOSIS — I10 HYPERTENSION, UNSPECIFIED TYPE: ICD-10-CM

## 2024-12-30 DIAGNOSIS — N13.8 BPH WITH OBSTRUCTION/LOWER URINARY TRACT SYMPTOMS: ICD-10-CM

## 2024-12-30 DIAGNOSIS — N40.1 BPH WITH OBSTRUCTION/LOWER URINARY TRACT SYMPTOMS: ICD-10-CM

## 2024-12-30 DIAGNOSIS — E11.69 TYPE 2 DIABETES MELLITUS WITH OTHER SPECIFIED COMPLICATION, UNSPECIFIED WHETHER LONG TERM INSULIN USE (MULTI): ICD-10-CM

## 2024-12-30 DIAGNOSIS — E78.2 MIXED HYPERLIPIDEMIA: ICD-10-CM

## 2024-12-30 DIAGNOSIS — Z79.4 TYPE 2 DIABETES MELLITUS WITHOUT COMPLICATION, WITH LONG-TERM CURRENT USE OF INSULIN (MULTI): ICD-10-CM

## 2024-12-30 DIAGNOSIS — I10 PRIMARY HYPERTENSION: ICD-10-CM

## 2024-12-30 PROCEDURE — 3078F DIAST BP <80 MM HG: CPT | Performed by: FAMILY MEDICINE

## 2024-12-30 PROCEDURE — 1036F TOBACCO NON-USER: CPT | Performed by: FAMILY MEDICINE

## 2024-12-30 PROCEDURE — 3074F SYST BP LT 130 MM HG: CPT | Performed by: FAMILY MEDICINE

## 2024-12-30 PROCEDURE — 1170F FXNL STATUS ASSESSED: CPT | Performed by: FAMILY MEDICINE

## 2024-12-30 PROCEDURE — 90662 IIV NO PRSV INCREASED AG IM: CPT | Performed by: FAMILY MEDICINE

## 2024-12-30 PROCEDURE — 3008F BODY MASS INDEX DOCD: CPT | Performed by: FAMILY MEDICINE

## 2024-12-30 PROCEDURE — 4010F ACE/ARB THERAPY RXD/TAKEN: CPT | Performed by: FAMILY MEDICINE

## 2024-12-30 PROCEDURE — G0439 PPPS, SUBSEQ VISIT: HCPCS | Performed by: FAMILY MEDICINE

## 2024-12-30 PROCEDURE — 1159F MED LIST DOCD IN RCRD: CPT | Performed by: FAMILY MEDICINE

## 2024-12-30 PROCEDURE — 3046F HEMOGLOBIN A1C LEVEL >9.0%: CPT | Performed by: FAMILY MEDICINE

## 2024-12-30 PROCEDURE — G0008 ADMIN INFLUENZA VIRUS VAC: HCPCS | Performed by: FAMILY MEDICINE

## 2024-12-30 PROCEDURE — 1123F ACP DISCUSS/DSCN MKR DOCD: CPT | Performed by: FAMILY MEDICINE

## 2024-12-30 PROCEDURE — 3050F LDL-C >= 130 MG/DL: CPT | Performed by: FAMILY MEDICINE

## 2024-12-30 RX ORDER — METFORMIN HYDROCHLORIDE 500 MG/1
1000 TABLET, EXTENDED RELEASE ORAL 2 TIMES DAILY
Qty: 360 TABLET | Refills: 0 | Status: SHIPPED | OUTPATIENT
Start: 2024-12-30

## 2024-12-30 RX ORDER — GLIMEPIRIDE 4 MG/1
4 TABLET ORAL 2 TIMES DAILY
Qty: 180 TABLET | Refills: 1 | Status: SHIPPED | OUTPATIENT
Start: 2024-12-30

## 2024-12-30 RX ORDER — BLOOD-GLUCOSE METER
EACH MISCELLANEOUS
Qty: 200 STRIP | Refills: 1 | Status: SHIPPED | OUTPATIENT
Start: 2024-12-30

## 2024-12-30 RX ORDER — LOSARTAN POTASSIUM 50 MG/1
100 TABLET ORAL DAILY
Qty: 180 TABLET | Refills: 1 | Status: SHIPPED | OUTPATIENT
Start: 2024-12-30

## 2024-12-30 RX ORDER — INSULIN GLARGINE 100 [IU]/ML
INJECTION, SOLUTION SUBCUTANEOUS
Qty: 45 ML | Refills: 2 | Status: SHIPPED | OUTPATIENT
Start: 2024-12-30

## 2024-12-30 ASSESSMENT — PATIENT HEALTH QUESTIONNAIRE - PHQ9
SUM OF ALL RESPONSES TO PHQ9 QUESTIONS 1 AND 2: 0
2. FEELING DOWN, DEPRESSED OR HOPELESS: NOT AT ALL
SUM OF ALL RESPONSES TO PHQ9 QUESTIONS 1 AND 2: 0
1. LITTLE INTEREST OR PLEASURE IN DOING THINGS: NOT AT ALL
1. LITTLE INTEREST OR PLEASURE IN DOING THINGS: NOT AT ALL
2. FEELING DOWN, DEPRESSED OR HOPELESS: NOT AT ALL

## 2024-12-30 ASSESSMENT — ACTIVITIES OF DAILY LIVING (ADL)
MANAGING_FINANCES: INDEPENDENT
BATHING: INDEPENDENT
GROCERY_SHOPPING: INDEPENDENT
DRESSING: INDEPENDENT
TAKING_MEDICATION: INDEPENDENT
DOING_HOUSEWORK: INDEPENDENT

## 2025-01-06 ENCOUNTER — INFUSION (OUTPATIENT)
Dept: HEMATOLOGY/ONCOLOGY | Facility: CLINIC | Age: 70
End: 2025-01-06
Payer: MEDICARE

## 2025-01-06 DIAGNOSIS — C18.9 MALIGNANT NEOPLASM OF COLON, UNSPECIFIED PART OF COLON (MULTI): ICD-10-CM

## 2025-01-06 DIAGNOSIS — C78.7 METASTATIC COLON CANCER TO LIVER (MULTI): ICD-10-CM

## 2025-01-06 DIAGNOSIS — C18.9 METASTATIC COLON CANCER TO LIVER (MULTI): ICD-10-CM

## 2025-01-06 PROCEDURE — 96523 IRRIG DRUG DELIVERY DEVICE: CPT

## 2025-02-28 ENCOUNTER — LAB (OUTPATIENT)
Dept: LAB | Facility: CLINIC | Age: 70
End: 2025-02-28
Payer: MEDICARE

## 2025-02-28 DIAGNOSIS — C18.9 METASTATIC COLON CANCER TO LIVER (MULTI): ICD-10-CM

## 2025-02-28 DIAGNOSIS — C18.9 MALIGNANT NEOPLASM OF COLON, UNSPECIFIED PART OF COLON (MULTI): ICD-10-CM

## 2025-02-28 DIAGNOSIS — C78.7 METASTATIC COLON CANCER TO LIVER (MULTI): ICD-10-CM

## 2025-02-28 LAB
ALBUMIN SERPL BCP-MCNC: 4.5 G/DL (ref 3.4–5)
ALP SERPL-CCNC: 53 U/L (ref 33–136)
ALT SERPL W P-5'-P-CCNC: 15 U/L (ref 10–52)
ANION GAP SERPL CALC-SCNC: 12 MMOL/L (ref 10–20)
AST SERPL W P-5'-P-CCNC: 16 U/L (ref 9–39)
BASOPHILS # BLD AUTO: 0.03 X10*3/UL (ref 0–0.1)
BASOPHILS NFR BLD AUTO: 0.4 %
BILIRUB SERPL-MCNC: 0.5 MG/DL (ref 0–1.2)
BUN SERPL-MCNC: 18 MG/DL (ref 6–23)
CALCIUM SERPL-MCNC: 9.9 MG/DL (ref 8.6–10.3)
CHLORIDE SERPL-SCNC: 103 MMOL/L (ref 98–107)
CO2 SERPL-SCNC: 26 MMOL/L (ref 21–32)
CREAT SERPL-MCNC: 1.21 MG/DL (ref 0.5–1.3)
EGFRCR SERPLBLD CKD-EPI 2021: 64 ML/MIN/1.73M*2
EOSINOPHIL # BLD AUTO: 0.22 X10*3/UL (ref 0–0.7)
EOSINOPHIL NFR BLD AUTO: 3 %
ERYTHROCYTE [DISTWIDTH] IN BLOOD BY AUTOMATED COUNT: 14.4 % (ref 11.5–14.5)
GLUCOSE SERPL-MCNC: 226 MG/DL (ref 74–99)
HCT VFR BLD AUTO: 41 % (ref 41–52)
HGB BLD-MCNC: 13.3 G/DL (ref 13.5–17.5)
IMM GRANULOCYTES # BLD AUTO: 0.01 X10*3/UL (ref 0–0.7)
IMM GRANULOCYTES NFR BLD AUTO: 0.1 % (ref 0–0.9)
LYMPHOCYTES # BLD AUTO: 1.48 X10*3/UL (ref 1.2–4.8)
LYMPHOCYTES NFR BLD AUTO: 20.2 %
MCH RBC QN AUTO: 28.7 PG (ref 26–34)
MCHC RBC AUTO-ENTMCNC: 32.4 G/DL (ref 32–36)
MCV RBC AUTO: 88 FL (ref 80–100)
MONOCYTES # BLD AUTO: 0.5 X10*3/UL (ref 0.1–1)
MONOCYTES NFR BLD AUTO: 6.8 %
NEUTROPHILS # BLD AUTO: 5.08 X10*3/UL (ref 1.2–7.7)
NEUTROPHILS NFR BLD AUTO: 69.5 %
PLATELET # BLD AUTO: 289 X10*3/UL (ref 150–450)
POTASSIUM SERPL-SCNC: 4.4 MMOL/L (ref 3.5–5.3)
PROT SERPL-MCNC: 7.4 G/DL (ref 6.4–8.2)
RBC # BLD AUTO: 4.64 X10*6/UL (ref 4.5–5.9)
SODIUM SERPL-SCNC: 137 MMOL/L (ref 136–145)
WBC # BLD AUTO: 7.3 X10*3/UL (ref 4.4–11.3)

## 2025-02-28 PROCEDURE — 84075 ASSAY ALKALINE PHOSPHATASE: CPT

## 2025-02-28 PROCEDURE — 85025 COMPLETE CBC W/AUTO DIFF WBC: CPT

## 2025-02-28 PROCEDURE — 82378 CARCINOEMBRYONIC ANTIGEN: CPT

## 2025-03-01 LAB — CEA SERPL-MCNC: 2.5 UG/L

## 2025-03-02 DIAGNOSIS — E11.9 TYPE 2 DIABETES MELLITUS WITHOUT COMPLICATION, WITHOUT LONG-TERM CURRENT USE OF INSULIN (MULTI): ICD-10-CM

## 2025-03-03 ENCOUNTER — HOSPITAL ENCOUNTER (OUTPATIENT)
Dept: RADIOLOGY | Facility: CLINIC | Age: 70
Discharge: HOME | End: 2025-03-03
Payer: MEDICARE

## 2025-03-03 DIAGNOSIS — C18.9 METASTATIC COLON CANCER TO LIVER (MULTI): ICD-10-CM

## 2025-03-03 DIAGNOSIS — C18.9 MALIGNANT NEOPLASM OF COLON, UNSPECIFIED PART OF COLON (MULTI): ICD-10-CM

## 2025-03-03 DIAGNOSIS — C78.7 METASTATIC COLON CANCER TO LIVER (MULTI): ICD-10-CM

## 2025-03-03 PROCEDURE — 74177 CT ABD & PELVIS W/CONTRAST: CPT

## 2025-03-03 PROCEDURE — 2550000001 HC RX 255 CONTRASTS: Performed by: INTERNAL MEDICINE

## 2025-03-03 RX ORDER — METFORMIN HYDROCHLORIDE 500 MG/1
1000 TABLET, EXTENDED RELEASE ORAL 2 TIMES DAILY
Qty: 360 TABLET | Refills: 0 | Status: SHIPPED | OUTPATIENT
Start: 2025-03-03

## 2025-03-03 RX ADMIN — IOHEXOL 75 ML: 350 INJECTION, SOLUTION INTRAVENOUS at 10:58

## 2025-03-09 DIAGNOSIS — E11.9 TYPE 2 DIABETES MELLITUS WITHOUT COMPLICATION, WITHOUT LONG-TERM CURRENT USE OF INSULIN (MULTI): ICD-10-CM

## 2025-03-09 DIAGNOSIS — I10 HYPERTENSION, UNSPECIFIED TYPE: ICD-10-CM

## 2025-03-10 ENCOUNTER — INFUSION (OUTPATIENT)
Dept: HEMATOLOGY/ONCOLOGY | Facility: CLINIC | Age: 70
End: 2025-03-10
Payer: MEDICARE

## 2025-03-10 ENCOUNTER — OFFICE VISIT (OUTPATIENT)
Dept: HEMATOLOGY/ONCOLOGY | Facility: CLINIC | Age: 70
End: 2025-03-10
Payer: MEDICARE

## 2025-03-10 VITALS
SYSTOLIC BLOOD PRESSURE: 146 MMHG | OXYGEN SATURATION: 94 % | RESPIRATION RATE: 16 BRPM | BODY MASS INDEX: 36.25 KG/M2 | HEART RATE: 89 BPM | WEIGHT: 252.65 LBS | TEMPERATURE: 97.3 F | DIASTOLIC BLOOD PRESSURE: 85 MMHG

## 2025-03-10 DIAGNOSIS — R97.20 ELEVATED PSA: ICD-10-CM

## 2025-03-10 DIAGNOSIS — C18.9 METASTATIC COLON CANCER TO LIVER (MULTI): ICD-10-CM

## 2025-03-10 DIAGNOSIS — C18.9 MALIGNANT NEOPLASM OF COLON, UNSPECIFIED PART OF COLON (MULTI): ICD-10-CM

## 2025-03-10 DIAGNOSIS — E11.65 TYPE 2 DIABETES MELLITUS WITH HYPERGLYCEMIA, WITH LONG-TERM CURRENT USE OF INSULIN: ICD-10-CM

## 2025-03-10 DIAGNOSIS — C18.9 MALIGNANT NEOPLASM OF COLON, UNSPECIFIED PART OF COLON (MULTI): Primary | ICD-10-CM

## 2025-03-10 DIAGNOSIS — I10 PRIMARY HYPERTENSION: ICD-10-CM

## 2025-03-10 DIAGNOSIS — C78.7 METASTATIC COLON CANCER TO LIVER (MULTI): ICD-10-CM

## 2025-03-10 DIAGNOSIS — Z79.4 TYPE 2 DIABETES MELLITUS WITH HYPERGLYCEMIA, WITH LONG-TERM CURRENT USE OF INSULIN: ICD-10-CM

## 2025-03-10 PROCEDURE — 1160F RVW MEDS BY RX/DR IN RCRD: CPT | Performed by: INTERNAL MEDICINE

## 2025-03-10 PROCEDURE — 1159F MED LIST DOCD IN RCRD: CPT | Performed by: INTERNAL MEDICINE

## 2025-03-10 PROCEDURE — 99214 OFFICE O/P EST MOD 30 MIN: CPT | Performed by: INTERNAL MEDICINE

## 2025-03-10 PROCEDURE — 1126F AMNT PAIN NOTED NONE PRSNT: CPT | Performed by: INTERNAL MEDICINE

## 2025-03-10 PROCEDURE — 1123F ACP DISCUSS/DSCN MKR DOCD: CPT | Performed by: INTERNAL MEDICINE

## 2025-03-10 PROCEDURE — 3077F SYST BP >= 140 MM HG: CPT | Performed by: INTERNAL MEDICINE

## 2025-03-10 PROCEDURE — 4010F ACE/ARB THERAPY RXD/TAKEN: CPT | Performed by: INTERNAL MEDICINE

## 2025-03-10 PROCEDURE — 3079F DIAST BP 80-89 MM HG: CPT | Performed by: INTERNAL MEDICINE

## 2025-03-10 PROCEDURE — G2211 COMPLEX E/M VISIT ADD ON: HCPCS | Performed by: INTERNAL MEDICINE

## 2025-03-10 RX ORDER — LOSARTAN POTASSIUM 50 MG/1
100 TABLET ORAL DAILY
Qty: 180 TABLET | Refills: 0 | Status: SHIPPED | OUTPATIENT
Start: 2025-03-10

## 2025-03-10 RX ORDER — GLIMEPIRIDE 4 MG/1
4 TABLET ORAL 2 TIMES DAILY
Qty: 180 TABLET | Refills: 0 | Status: SHIPPED | OUTPATIENT
Start: 2025-03-10

## 2025-03-10 ASSESSMENT — PAIN SCALES - GENERAL: PAINLEVEL_OUTOF10: 0-NO PAIN

## 2025-03-10 NOTE — PROGRESS NOTES
Patient ID: Richard Hicks is a 70 y.o. male.  Referring Physician: Luis Palacios MD  [id number]  [place] [address]  Primary Care Provider: Anand Adkins MD  Visit Type: Follow Up      Subjective    HPI How was my CT scan?  I want to get this port out    Review of Systems   Constitutional: Negative.    HENT:  Negative.     Eyes: Negative.    Respiratory: Negative.     Cardiovascular: Negative.    Gastrointestinal: Negative.    Endocrine: Negative.    Genitourinary: Negative.     Skin: Negative.    Neurological: Negative.    Hematological: Negative.    Psychiatric/Behavioral: Negative.          Objective   BSA: 2.38 meters squared  /85 (BP Location: Left arm, Patient Position: Sitting, BP Cuff Size: Large adult)   Pulse 89   Temp 36.3 °C (97.3 °F) (Temporal)   Resp 16   Wt 115 kg (252 lb 10.4 oz)   SpO2 94%   BMI 36.25 kg/m²      has a past medical history of Anemia, Arthritis, BPH (benign prostatic hyperplasia), GERD (gastroesophageal reflux disease), H/O colon cancer, stage IV (06/2022), Heart valve disease, Hyperlipidemia, Hypertension, Liver lesion, Lung cancer (Multi), Personal history of other malignant neoplasm of skin, Pneumonitis, Sleep apnea, and Type 2 diabetes mellitus without complications (Multi) (09/12/2020).   has a past surgical history that includes Tonsillectomy (04/09/2014); Other surgical history (09/30/2021); Other surgical history (09/30/2021); Other surgical history (09/12/2020); CT guided percutaneous biopsy liver (02/24/2022); CT guided percutaneous biopsy lung (08/01/2023); Colonoscopy; and Colon surgery.  Family History   Problem Relation Name Age of Onset    Stroke Father      Heart attack Father      Heart attack Brother       Oncology History   Colon cancer (Multi)   8/29/2023 - 12/8/2023 Chemotherapy    FOLFIRI (Fluorouracil Continuous Infusion / Leucovorin / Irinotecan), 14 Day Cycles     9/13/2023 Initial Diagnosis    Colon cancer (CMS/HCC)    "  Metastatic colon cancer to liver (Multi)   8/29/2023 - 12/8/2023 Chemotherapy    FOLFIRI (Fluorouracil Continuous Infusion / Leucovorin / Irinotecan), 14 Day Cycles     9/13/2023 Initial Diagnosis    Metastatic colon cancer to liver (CMS/HCC)         Toby Hicks \"Ed\"  reports that he quit smoking about 37 years ago. His smoking use included cigarettes. He has never used smokeless tobacco.  He  reports that he does not currently use alcohol.  He  reports no history of drug use.    Physical Exam  Vitals reviewed.   Constitutional:       Appearance: Normal appearance.   HENT:      Head: Normocephalic.      Mouth/Throat:      Mouth: Mucous membranes are moist.   Eyes:      Extraocular Movements: Extraocular movements intact.      Pupils: Pupils are equal, round, and reactive to light.   Cardiovascular:      Rate and Rhythm: Normal rate and regular rhythm.      Pulses: Normal pulses.      Heart sounds: Normal heart sounds.      Comments: Chest wall portacath  Pulmonary:      Effort: Pulmonary effort is normal.      Breath sounds: Normal breath sounds.   Abdominal:      General: Bowel sounds are normal.      Palpations: Abdomen is soft.   Musculoskeletal:         General: Normal range of motion.      Cervical back: Normal range of motion and neck supple.   Skin:     General: Skin is warm.   Neurological:      General: No focal deficit present.      Mental Status: He is alert and oriented to person, place, and time.   Psychiatric:         Mood and Affect: Mood normal.         Behavior: Behavior normal.         WBC   Date/Time Value Ref Range Status   02/28/2025 02:05 PM 7.3 4.4 - 11.3 x10*3/uL Final   09/10/2024 12:23 PM 8.6 4.4 - 11.3 x10*3/uL Final   03/12/2024 12:38 PM 6.8 4.4 - 11.3 x10*3/uL Final     nRBC   Date Value Ref Range Status   09/10/2024 0.0 0.0 - 0.0 /100 WBCs Final   03/12/2024 0.0 0.0 - 0.0 /100 WBCs Final   01/10/2024 0.0 0.0 - 0.0 /100 WBCs Final     RBC   Date Value Ref Range Status "   02/28/2025 4.64 4.50 - 5.90 x10*6/uL Final   09/10/2024 4.68 4.50 - 5.90 x10*6/uL Final   03/12/2024 4.64 4.50 - 5.90 x10*6/uL Final     Hemoglobin   Date Value Ref Range Status   02/28/2025 13.3 (L) 13.5 - 17.5 g/dL Final   09/10/2024 13.5 13.5 - 17.5 g/dL Final   03/12/2024 13.7 13.5 - 17.5 g/dL Final     Hematocrit   Date Value Ref Range Status   02/28/2025 41.0 41.0 - 52.0 % Final   09/10/2024 41.5 41.0 - 52.0 % Final   03/12/2024 40.8 (L) 41.0 - 52.0 % Final     MCV   Date/Time Value Ref Range Status   02/28/2025 02:05 PM 88 80 - 100 fL Final   09/10/2024 12:23 PM 89 80 - 100 fL Final   03/12/2024 12:38 PM 88 80 - 100 fL Final     MCH   Date/Time Value Ref Range Status   02/28/2025 02:05 PM 28.7 26.0 - 34.0 pg Final   09/10/2024 12:23 PM 28.8 26.0 - 34.0 pg Final   03/12/2024 12:38 PM 29.5 26.0 - 34.0 pg Final     MCHC   Date/Time Value Ref Range Status   02/28/2025 02:05 PM 32.4 32.0 - 36.0 g/dL Final   09/10/2024 12:23 PM 32.5 32.0 - 36.0 g/dL Final   03/12/2024 12:38 PM 33.6 32.0 - 36.0 g/dL Final     RDW   Date/Time Value Ref Range Status   02/28/2025 02:05 PM 14.4 11.5 - 14.5 % Final   09/10/2024 12:23 PM 13.5 11.5 - 14.5 % Final   03/12/2024 12:38 PM 13.3 11.5 - 14.5 % Final     Platelets   Date/Time Value Ref Range Status   02/28/2025 02:05  150 - 450 x10*3/uL Final   09/10/2024 12:23  150 - 450 x10*3/uL Final   03/12/2024 12:38  150 - 450 x10*3/uL Final     MPV   Date/Time Value Ref Range Status   10/25/2023 10:35 AM 10.1 7.5 - 11.5 fL Final   10/10/2023 02:18 PM 10.2 7.5 - 11.5 fL Final     Neutrophils %   Date/Time Value Ref Range Status   02/28/2025 02:05 PM 69.5 40.0 - 80.0 % Final   09/10/2024 12:23 PM 70.5 40.0 - 80.0 % Final   03/12/2024 12:38 PM 66.5 40.0 - 80.0 % Final     Immature Granulocytes %, Automated   Date/Time Value Ref Range Status   02/28/2025 02:05 PM 0.1 0.0 - 0.9 % Final     Comment:     Immature Granulocyte Count (IG) includes promyelocytes, myelocytes and  metamyelocytes but does not include bands. Percent differential counts (%) should be interpreted in the context of the absolute cell counts (cells/UL).   09/10/2024 12:23 PM 0.5 0.0 - 0.9 % Final     Comment:     Immature Granulocyte Count (IG) includes promyelocytes, myelocytes and metamyelocytes but does not include bands. Percent differential counts (%) should be interpreted in the context of the absolute cell counts (cells/UL).   03/12/2024 12:38 PM 0.3 0.0 - 0.9 % Final     Comment:     Immature Granulocyte Count (IG) includes promyelocytes, myelocytes and metamyelocytes but does not include bands. Percent differential counts (%) should be interpreted in the context of the absolute cell counts (cells/UL).     Lymphocytes %   Date/Time Value Ref Range Status   02/28/2025 02:05 PM 20.2 13.0 - 44.0 % Final   09/10/2024 12:23 PM 19.8 13.0 - 44.0 % Final   03/12/2024 12:38 PM 23.2 13.0 - 44.0 % Final     Monocytes %   Date/Time Value Ref Range Status   02/28/2025 02:05 PM 6.8 2.0 - 10.0 % Final   09/10/2024 12:23 PM 6.0 2.0 - 10.0 % Final   03/12/2024 12:38 PM 7.4 2.0 - 10.0 % Final     Eosinophils %   Date/Time Value Ref Range Status   02/28/2025 02:05 PM 3.0 0.0 - 6.0 % Final   09/10/2024 12:23 PM 2.8 0.0 - 6.0 % Final   03/12/2024 12:38 PM 2.5 0.0 - 6.0 % Final     Basophils %   Date/Time Value Ref Range Status   02/28/2025 02:05 PM 0.4 0.0 - 2.0 % Final   09/10/2024 12:23 PM 0.4 0.0 - 2.0 % Final   03/12/2024 12:38 PM 0.1 0.0 - 2.0 % Final     Neutrophils Absolute   Date/Time Value Ref Range Status   02/28/2025 02:05 PM 5.08 1.20 - 7.70 x10*3/uL Final     Comment:     Percent differential counts (%) should be interpreted in the context of the absolute cell counts (cells/uL).   09/10/2024 12:23 PM 6.05 1.20 - 7.70 x10*3/uL Final     Comment:     Percent differential counts (%) should be interpreted in the context of the absolute cell counts (cells/uL).   03/12/2024 12:38 PM 4.51 1.20 - 7.70 x10*3/uL Final      "Comment:     Percent differential counts (%) should be interpreted in the context of the absolute cell counts (cells/uL).     Immature Granulocytes Absolute, Automated   Date/Time Value Ref Range Status   02/28/2025 02:05 PM 0.01 0.00 - 0.70 x10*3/uL Final   09/10/2024 12:23 PM 0.04 0.00 - 0.70 x10*3/uL Final   03/12/2024 12:38 PM 0.02 0.00 - 0.70 x10*3/uL Final     Lymphocytes Absolute   Date/Time Value Ref Range Status   02/28/2025 02:05 PM 1.48 1.20 - 4.80 x10*3/uL Final   09/10/2024 12:23 PM 1.70 1.20 - 4.80 x10*3/uL Final   03/12/2024 12:38 PM 1.57 1.20 - 4.80 x10*3/uL Final     Monocytes Absolute   Date/Time Value Ref Range Status   02/28/2025 02:05 PM 0.50 0.10 - 1.00 x10*3/uL Final   09/10/2024 12:23 PM 0.51 0.10 - 1.00 x10*3/uL Final   03/12/2024 12:38 PM 0.50 0.10 - 1.00 x10*3/uL Final     Eosinophils Absolute   Date/Time Value Ref Range Status   02/28/2025 02:05 PM 0.22 0.00 - 0.70 x10*3/uL Final   09/10/2024 12:23 PM 0.24 0.00 - 0.70 x10*3/uL Final   03/12/2024 12:38 PM 0.17 0.00 - 0.70 x10*3/uL Final     Basophils Absolute   Date/Time Value Ref Range Status   02/28/2025 02:05 PM 0.03 0.00 - 0.10 x10*3/uL Final   09/10/2024 12:23 PM 0.03 0.00 - 0.10 x10*3/uL Final   03/12/2024 12:38 PM 0.01 0.00 - 0.10 x10*3/uL Final       No components found for: \"PT\"  aPTT   Date/Time Value Ref Range Status   06/16/2022 01:53 PM 30 26 - 39 sec Final     Comment:       THE APTT IS NO LONGER USED FOR MONITORING     UNFRACTIONATED HEPARIN THERAPY.    FOR MONITORING HEPARIN THERAPY,     USE THE HEPARIN ASSAY.     04/16/2022 02:41 PM 23 (L) 26 - 39 sec Final     Comment:       THE APTT IS NO LONGER USED FOR MONITORING     UNFRACTIONATED HEPARIN THERAPY.    FOR MONITORING HEPARIN THERAPY,     USE THE HEPARIN ASSAY.       Medication Documentation Review Audit       Reviewed by Sarah Gotti MA (Medical Assistant) on 03/10/25 at 0951      Medication Order Taking? Sig Documenting Provider Last Dose Status   acetaminophen " (Tylenol) 325 mg tablet [id number] Yes Take 2 tablets (650 mg) by mouth every 4 hours. NICOLE Meza-CNP Taking Active   blood sugar diagnostic (OneTouch Verio test strips) strip [id number] Yes TEST BID Anand Adkins MD  Active   cholecalciferol (Vitamin D-3) 25 MCG (1000 UT) capsule [id number] Yes Take 1 capsule (25 mcg) by mouth once daily. Historical Provider, MD Taking Active   flaxseed oiL 1,000 mg capsule [id number] Yes Take 1 capsule (1,000 mg) by mouth once daily. Historical Provider, MD Taking Active     Discontinued 03/10/25 0936   glimepiride (Amaryl) 4 mg tablet [id number] Yes TAKE 1 TABLET BY MOUTH TWICE  DAILY Anand Adkins MD  Active   insulin glargine (Lantus Solostar U-100 Insulin) 100 unit/mL (3 mL) pen [id number] Yes INJECT SUBCUTANEOUSLY 40 UNITS  AT BEDTIME Anand Adkins MD  Active     Discontinued 03/10/25 0936   losartan (Cozaar) 50 mg tablet [id number] Yes TAKE 2 TABLETS BY MOUTH ONCE  DAILY Anand Adkins MD  Active   metFORMIN  mg 24 hr tablet [id number] Yes TAKE 2 TABLETS BY MOUTH TWICE  DAILY Anand Adkins MD  Active   omega-3 fatty acids-fish oil (Fish OiL) 340-1,000 mg capsule [id number] Yes Take 1 capsule by mouth once daily. Historical MD Jeaneth Taking Active   zinc sulfate (Zincate) 220 (50 Zn) MG capsule [id number] Yes Take 1 capsule (50 mg of elemental zinc) by mouth once daily. Historical Provider, MD Taking Active                   Assessment/Plan    1) stage IV colon cancer  -was diagnosed with liver mets at time of colon cancer diagnosis (transverse colon); FNA of 1.3 cm lesion in segment 1 of liver showed metastatic adenocarcinoma  -he received neoadjuvant FOLFOX, then had laparoscopic hemicolectomy, Dr Preciado performed microwave ablation of liver lesion  -then completed FOLFOX (12 cycles total)  -unfortunately earlier this year relapsed in his lungs  -s/p FOLFIRI x 8 cycles  -has been having worsening cumulative confusion  -12/11/2023 CT  scan--ablation zone in liver is stable, no new liver mets; no new lung nodules; left lower lobe lung nodule (bx proven to be metastatic colon cancer) is unchanged  -will give him a break from systemic therapy  -on 1/9/2024 he was taken to the OR by Dr Mcgill for left VATS wedge resection of LLL  -path showed metastatic adenocarcinoma morphologically consistent with metastasis from pt's known colonic adenocarcinoma; 1.1 cm; no pleural invasion; parenchymal resection margin free of tumor  -CT done on 3/13/2024 reviewed: bilateral subpleural reticular shadowing and fibrotic changes, status post left lower lobe wedge resection with no gross soft tissue masses along the staple line; no new suspicious pulmonary nodules or masses;  no mediastinal, hilar or axillary lymphadenopathy; stable caudate lobe partially exophytic lesion 1.7 cm; s/p right hemicolectomy with ileocolonic anastomosis ; no enlarged intra abdominopelvic lymphadenopathy  -CT body done on 9/11/2024 reviewed--bilateral subpleural reticular shadowing and fibrotic changes; status post left lower lobe wedge resection with no gross soft tissue masses along staple line; no new suspicious pulmonary nodules or masses; no mediastinal, hilar or axillary lymphadenopathy is present; diffuse hepatic steatosis; stable caudate lobe partially exophytic lesion 1.7 cm; spleen is normal size without focal lesions; status post right hemicolectomy with ileocolonic anastomosis with no gross soft tissue masses at the surgical bed; no bowel dilatation  -will maintain his port Q8 weeks  -port was last flushed on 1/6/2025--he would like it removed--placed order to have it removed by [facility]  -labs done on 2/28/2025 included CBC + COMP + CEA  -results reviewed--wbc 7.3, hgb 13.3, plt 289,000, creatinine 1.21, calcium 9.9, alk phos 53, AST 16, total bili 0.5, ALT 15, CEA 2.5  -CT scan done on 3/3/2025 reviewed--mild traction bronchiectasis in the lower lung zones; postoperative  changes left lower lobe wedge resection; no new suspicious pulmonary nodules; no mediastinal, hilar or axillary lymphadenopathy; unchanged 16 mm hypoenhancing lesion at the caudate lobe unchanged; no new liver lesions; status post partial colectomy with ileocolic anastomosis; no suspicious mass, abnormal enhancement or thickening about the anastomosis; no abdominopelvic lymphadenopathy  -next CT scan due in 1 year  -will continue to see him Q6 months     2) elevated PSA  -last PSA was 6.98 (1/16/2023)     3) diabetes  -on metformin  -on glimepiride  -on insulin lantus     4) hypertension  -on losartan        Problem List Items Addressed This Visit             ICD-10-CM    Colon cancer (Multi) C18.9    Relevant Orders    IR CVC port removal    Clinic Appointment Request Follow Up; LUIS PALACIOS; [facility]    CBC and Auto Differential    Comprehensive metabolic panel    CEA    Metastatic colon cancer to liver (Multi) C18.9, C78.7            Luis Palacios MD

## 2025-03-12 ENCOUNTER — OFFICE VISIT (OUTPATIENT)
Dept: ORTHOPEDIC SURGERY | Facility: CLINIC | Age: 70
End: 2025-03-12
Payer: MEDICARE

## 2025-03-12 ENCOUNTER — HOSPITAL ENCOUNTER (OUTPATIENT)
Dept: RADIOLOGY | Facility: CLINIC | Age: 70
Discharge: HOME | End: 2025-03-12
Payer: MEDICARE

## 2025-03-12 DIAGNOSIS — S46.819A STRAIN OF DELTOID MUSCLE, INITIAL ENCOUNTER: Primary | ICD-10-CM

## 2025-03-12 DIAGNOSIS — M76.61 ACHILLES TENDINITIS OF RIGHT LOWER EXTREMITY: ICD-10-CM

## 2025-03-12 DIAGNOSIS — M25.571 ACUTE RIGHT ANKLE PAIN: ICD-10-CM

## 2025-03-12 PROCEDURE — 1123F ACP DISCUSS/DSCN MKR DOCD: CPT | Performed by: INTERNAL MEDICINE

## 2025-03-12 PROCEDURE — 4010F ACE/ARB THERAPY RXD/TAKEN: CPT | Performed by: INTERNAL MEDICINE

## 2025-03-12 PROCEDURE — 99213 OFFICE O/P EST LOW 20 MIN: CPT | Performed by: INTERNAL MEDICINE

## 2025-03-12 PROCEDURE — 73610 X-RAY EXAM OF ANKLE: CPT | Mod: RT

## 2025-03-12 PROCEDURE — 99203 OFFICE O/P NEW LOW 30 MIN: CPT | Performed by: INTERNAL MEDICINE

## 2025-03-12 PROCEDURE — 73610 X-RAY EXAM OF ANKLE: CPT | Mod: RIGHT SIDE | Performed by: INTERNAL MEDICINE

## 2025-03-12 PROCEDURE — L1902 AFO ANKLE GAUNTLET PRE OTS: HCPCS | Performed by: INTERNAL MEDICINE

## 2025-03-12 ASSESSMENT — ENCOUNTER SYMPTOMS
GASTROINTESTINAL NEGATIVE: 1
EYES NEGATIVE: 1
CARDIOVASCULAR NEGATIVE: 1
ENDOCRINE NEGATIVE: 1
RESPIRATORY NEGATIVE: 1
PSYCHIATRIC NEGATIVE: 1
HEMATOLOGIC/LYMPHATIC NEGATIVE: 1
CONSTITUTIONAL NEGATIVE: 1
NEUROLOGICAL NEGATIVE: 1

## 2025-03-12 NOTE — PROGRESS NOTES
"  Acute Injury New Patient Visit    CC: No chief complaint on file.      HPI: Toby \"Ed\" is a 70 y.o. male presents today for evaluation for subacute right ankle injury sustained about six weeks ago after he fell two times in that period. Initially he missed a step and fell and then he slipped on ice and fell. He notes persistent right ankle pain. He is here for initial evaluation and x-rays.        Review of Systems   GENERAL: Negative for malaise, significant weight loss, fever  MUSCULOSKELETAL: See HPI  NEURO:  Negative for numbness / tingling     Past Medical History  Past Medical History:   Diagnosis Date    Anemia     Arthritis     BPH (benign prostatic hyperplasia)     GERD (gastroesophageal reflux disease)     H/O colon cancer, stage IV 06/2022    s/p R colectomy    Heart valve disease     trace mitral valve regurgitation. Echo 11/2022    Hyperlipidemia     Hypertension     Liver lesion     stable solitary liver lesion    Lung cancer (Multi)     METS from Colon cancer    Personal history of other malignant neoplasm of skin     History of malignant neoplasm of skin    Pneumonitis     Sleep apnea     Type 2 diabetes mellitus without complications (Multi) 09/12/2020    Diabetes mellitus type 2, uncomplicated       Medication review  Medication Documentation Review Audit       Reviewed by Sarah Gotti MA (Medical Assistant) on 03/10/25 at 0951      Medication Order Taking? Sig Documenting Provider Last Dose Status   acetaminophen (Tylenol) 325 mg tablet [id number] Yes Take 2 tablets (650 mg) by mouth every 4 hours. Cassidy Dunn APRN-CNP Taking Active   blood sugar diagnostic (OneTouch Verio test strips) strip [id number] Yes TEST BID Anand Adkins MD  Active   cholecalciferol (Vitamin D-3) 25 MCG (1000 UT) capsule [id number] Yes Take 1 capsule (25 mcg) by mouth once daily. Historical Provider, MD Taking Active   flaxseed oiL 1,000 mg capsule [id number] Yes Take 1 capsule (1,000 mg) by mouth once daily. " Historical Provider, MD Taking Active     Discontinued 03/10/25 0936   glimepiride (Amaryl) 4 mg tablet [id number] Yes TAKE 1 TABLET BY MOUTH TWICE  DAILY Anand Adkins MD  Active   insulin glargine (Lantus Solostar U-100 Insulin) 100 unit/mL (3 mL) pen [id number] Yes INJECT SUBCUTANEOUSLY 40 UNITS  AT BEDTIME Anand Adkins MD  Active     Discontinued 03/10/25 0936   losartan (Cozaar) 50 mg tablet [id number] Yes TAKE 2 TABLETS BY MOUTH ONCE  DAILY Anand Adkins MD  Active   metFORMIN  mg 24 hr tablet [id number] Yes TAKE 2 TABLETS BY MOUTH TWICE  DAILY Anand Adkins MD  Active   omega-3 fatty acids-fish oil (Fish OiL) 340-1,000 mg capsule [id number] Yes Take 1 capsule by mouth once daily. Historical Provider, MD Taking Active   zinc sulfate (Zincate) 220 (50 Zn) MG capsule [id number] Yes Take 1 capsule (50 mg of elemental zinc) by mouth once daily. Historical Provider, MD Taking Active                    Allergies  Allergies   Allergen Reactions    Oxaliplatin Anaphylaxis and Angioedema       Social History  Social History     Socioeconomic History    Marital status: Single     Spouse name: Not on file    Number of children: Not on file    Years of education: Not on file    Highest education level: Not on file   Occupational History    Not on file   Tobacco Use    Smoking status: Former     Current packs/day: 0.00     Types: Cigarettes     Quit date: 1988     Years since quittin.2    Smokeless tobacco: Never   Vaping Use    Vaping status: Never Used   Substance and Sexual Activity    Alcohol use: Not Currently    Drug use: Never    Sexual activity: Not on file   Other Topics Concern    Not on file   Social History Narrative    Not on file     Social Drivers of Health     Financial Resource Strain: Patient Declined (2024)    Overall Financial Resource Strain (CARDIA)     Difficulty of Paying Living Expenses: Patient declined   Food Insecurity: Not on file   Transportation Needs:  Patient Declined (1/9/2024)    PRAPARE - Transportation     Lack of Transportation (Medical): Patient declined     Lack of Transportation (Non-Medical): Patient declined   Physical Activity: Not on file   Stress: Not on file   Social Connections: Not on file   Intimate Partner Violence: Not on file   Housing Stability: Patient Declined (1/9/2024)    Housing Stability Vital Sign     Unable to Pay for Housing in the Last Year: Patient declined     Number of Places Lived in the Last Year: 1     Unstable Housing in the Last Year: Patient declined       Surgical History  Past Surgical History:   Procedure Laterality Date    COLON SURGERY      Right colectomy    COLONOSCOPY      CT GUIDED PERCUTANEOUS BIOPSY LIVER  02/24/2022    CT GUIDED PERCUTANEOUS BIOPSY LIVER 2/24/2022 [facility]    CT GUIDED PERCUTANEOUS BIOPSY LUNG  08/01/2023    CT GUIDED PERCUTANEOUS BIOPSY LUNG 8/1/2023 [facility]    OTHER SURGICAL HISTORY  09/30/2021    Cardiac catheterization    OTHER SURGICAL HISTORY  09/30/2021    Rhinologic surgery    OTHER SURGICAL HISTORY  09/12/2020    Skin biopsy    TONSILLECTOMY  04/09/2014    Tonsillectomy       Physical Exam:  GENERAL:  Patient is awake, alert, and oriented to person place and time.  Patient appears well nourished and well kept.  Affect Calm, Not Acutely Distressed.  HEENT:  Normocephalic, Atraumatic, EOMI  CARDIOVASCULAR:  Hemodynamically stable.  RESPIRATORY:  Normal respirations with unlabored breathing.  Extremity: Right ankle shows skin is intact.  There is no erythema or warmth.  There is no clinical signs of infection.  There is no pain over the lateral malleolus.  There is no pain of the medial malleolus.  There is no pain over the ATF, CF or PTF ligament.  There is pain over the deltoid ligament.  Mild pain over the Achilles tendon.  Negative Riddle's test.  Negative squeeze test.  Negative anterior drawer test.  Negative talar tilt test.  No pain over the anterior process of the talus.   There is no pain over the talar dome.  There is no pain at the base of the fifth metatarsal bone.  No pain of the calcaneus.  No pain over the plantar aponeurosis.  No pain of the midfoot.  Neurovascularly intact.      Diagnostics: X-rays reviewed  CT chest abdomen pelvis w IV contrast  Narrative: Interpreted By:  Sage Ravi,   STUDY:  CT CHEST ABDOMEN PELVIS W IV CONTRAST;  3/3/2025 11:06 am      INDICATION:  Signs/Symptoms:history of colon cancer with lung and liver  metastases, followup.      COMPARISON:  Multiple CTs of the chest, abdomen and pelvis most recently 09/11/2024      ACCESSION NUMBER(S):  [id number]      ORDERING CLINICIAN:  ZION ROSARIO      TECHNIQUE:  CT of the chest, abdomen, and pelvis was performed.  Contiguous axial  images were obtained at 3 mm slice thickness through the chest,  abdomen and pelvis. Coronal and sagittal reconstructions at 3 mm  slice thickness were performed.  75 ml of contrast Omnipaque 350 were administered intravenously  without immediate complication.      FINDINGS:  CHEST:      LUNG/PLEURA/LARGE AIRWAYS:  Central airways are patent without endobronchial lesions. Stable  bilateral subpleural reticulonodular opacities, compatible with  fibrosis. Mild traction bronchiectasis, in the lower lung zones.  Postoperative changes left lower lobe wedge resection. No focal  consolidation. No new suspicious pulmonary nodules. No pneumothorax.  No pleural effusion.      VESSELS:  Aorta and pulmonary arteries are normal caliber.  Mild  atherosclerotic changes are noted of the aorta and branching vessels.  No coronary artery calcifications are present.      HEART:  Normal size.  No pericardial effusion      MEDIASTINUM AND VALE:  No mediastinal, hilar or axillary lymphadenopathy is present.  The  esophagus is nondilated and appears normal in entire length.      CHEST WALL AND LOWER NECK:  Right chest wall port catheter. The visualized thyroid gland appears  within normal  limits.      ABDOMEN:      LIVER:  Homogeneous attenuation. Unchanged 16 mm hypoenhancing lesion at the  caudate (series 201, image 80). The lesion is unchanged in size since  at least 03/13/2024. no new lesions identified      BILE DUCTS:  The intrahepatic and extrahepatic ducts are not dilated.      GALLBLADDER:  The gallbladder is nondistended and without evidence of radiopaque  stones.      PANCREAS:  The pancreas appears unremarkable without evidence of ductal  dilatation or masses.      SPLEEN:  The spleen is normal in size without focal lesions.      ADRENAL GLANDS:  Bilateral adrenal glands appear normal.      KIDNEYS AND URETERS:  The kidneys are normal in size and enhance symmetrically.  2 mm right  interpolar region nonobstructing calculus without hydronephrosis.      PELVIS:      BLADDER:  The urinary bladder appears normal without abnormal wall thickening.      REPRODUCTIVE ORGANS:  Mild prostatomegaly.      BOWEL:  Stomach is unremarkable. No bowel dilation or wall thickening. Status  post partial colectomy with ileocolic anastomosis. No suspicious  mass, abnormal enhancement or thickening about the anastomosis.  Moderate colonic stool. Appendix is not visualized.          VESSELS:  There is no aneurysmal dilatation of the abdominal aorta. The IVC  appears normal. Atherosclerotic calcification of the abdominal aorta  and iliac arteries.      PERITONEUM/RETROPERITONEUM/LYMPH NODES:  No ascites or free air, no fluid collection.  No abdominopelvic  lymphadenopathy is present.      BONE AND SOFT TISSUE:  No suspicious osseous lesions are identified. Degenerative discogenic  disease is noted in the lower thoracic and lumbar spine.  The  abdominal wall soft tissues appear normal.      Impression: CHEST:  1.  No evidence of metastatic disease in the thorax.  2. Status post left lower lobe wedge resection without evidence of  local recurrence.  3. No suspicious adenopathy.  4. Stable mild fibrotic changes.     "  ABDOMEN-PELVIS:  1.  No evidence of metastatic disease in the abdomen and pelvis.  2. Status post hemicolectomy with ileocolic anastomosis. No evidence  of local recurrence.          Signed by: Sage Ravi 3/3/2025 7:00 PM  Dictation workstation:   [id number]      Procedure: None    Assessment:   Right ankle sprain, deltoid sprain  Right achilles tendinitis    Plan: Toby \"Ed\" presents today for initial evaluation for subacute right ankle injury initially sustained six weeks ago after he he missed a step and fell. X-rays showed no obvious fractures. We recommended physical therapy and lace up ankle brace for support. He will follow-up in 3-4 weeks for reevaluation.  If no improvement we may consider further advanced imaging.    Orders Placed This Encounter    XR ankle right 3+ views      At the conclusion of the visit there were no further questions by the patient/family regarding their plan of care.  Patient was instructed to call or return with any issues, questions, or concerns regarding their injury and/or treatment plan described above.     03/12/25 at 8:17 AM - Michael Camacho MD  Scribe Attestation  By signing my name below, I, Daryl Sandovalmo, Scribe   attest that this documentation has been prepared under the direction and in the presence of Michael Camacho MD.    Office: [phone]    This note was prepared using voice recognition software.  The details of this note are correct and have been reviewed, and corrected to the best of my ability.  Some grammatical errors may persist related to the Dragon software.  "

## 2025-03-25 ENCOUNTER — HOSPITAL ENCOUNTER (OUTPATIENT)
Dept: RADIOLOGY | Facility: HOSPITAL | Age: 70
Discharge: HOME | End: 2025-03-25
Payer: MEDICARE

## 2025-03-25 VITALS
DIASTOLIC BLOOD PRESSURE: 89 MMHG | HEART RATE: 80 BPM | RESPIRATION RATE: 16 BRPM | TEMPERATURE: 97.2 F | WEIGHT: 250 LBS | SYSTOLIC BLOOD PRESSURE: 171 MMHG | BODY MASS INDEX: 35.79 KG/M2 | HEIGHT: 70 IN | OXYGEN SATURATION: 95 %

## 2025-03-25 DIAGNOSIS — C18.9 MALIGNANT NEOPLASM OF COLON, UNSPECIFIED PART OF COLON (MULTI): ICD-10-CM

## 2025-03-25 PROCEDURE — 2720000007 HC OR 272 NO HCPCS

## 2025-03-25 PROCEDURE — 36590 REMOVAL TUNNELED CV CATH: CPT | Mod: RT | Performed by: RADIOLOGY

## 2025-03-25 PROCEDURE — 2500000004 HC RX 250 GENERAL PHARMACY W/ HCPCS (ALT 636 FOR OP/ED): Performed by: RADIOLOGY

## 2025-03-25 PROCEDURE — 99152 MOD SED SAME PHYS/QHP 5/>YRS: CPT

## 2025-03-25 PROCEDURE — 7100000009 HC PHASE TWO TIME - INITIAL BASE CHARGE

## 2025-03-25 PROCEDURE — 2500000005 HC RX 250 GENERAL PHARMACY W/O HCPCS: Performed by: RADIOLOGY

## 2025-03-25 PROCEDURE — 7100000010 HC PHASE TWO TIME - EACH INCREMENTAL 1 MINUTE

## 2025-03-25 RX ORDER — LIDOCAINE HYDROCHLORIDE AND EPINEPHRINE 10; 10 UG/ML; MG/ML
INJECTION, SOLUTION INFILTRATION; PERINEURAL
Status: COMPLETED | OUTPATIENT
Start: 2025-03-25 | End: 2025-03-25

## 2025-03-25 RX ORDER — FENTANYL CITRATE 50 UG/ML
INJECTION, SOLUTION INTRAMUSCULAR; INTRAVENOUS
Status: COMPLETED | OUTPATIENT
Start: 2025-03-25 | End: 2025-03-25

## 2025-03-25 RX ORDER — MIDAZOLAM HYDROCHLORIDE 1 MG/ML
INJECTION, SOLUTION INTRAMUSCULAR; INTRAVENOUS
Status: COMPLETED | OUTPATIENT
Start: 2025-03-25 | End: 2025-03-25

## 2025-03-25 RX ADMIN — MIDAZOLAM 1 MG: 1 INJECTION INTRAMUSCULAR; INTRAVENOUS at 14:05

## 2025-03-25 RX ADMIN — Medication 3 L/MIN: at 13:59

## 2025-03-25 RX ADMIN — FENTANYL CITRATE 50 MCG: 50 INJECTION, SOLUTION INTRAMUSCULAR; INTRAVENOUS at 14:05

## 2025-03-25 RX ADMIN — LIDOCAINE HYDROCHLORIDE,EPINEPHRINE BITARTRATE 10 ML: 10; .01 INJECTION, SOLUTION INFILTRATION; PERINEURAL at 14:06

## 2025-03-25 ASSESSMENT — PAIN SCALES - GENERAL
PAINLEVEL_OUTOF10: 0 - NO PAIN
PAINLEVEL_OUTOF10: 5 - MODERATE PAIN
PAINLEVEL_OUTOF10: 0 - NO PAIN
PAINLEVEL_OUTOF10: 0 - NO PAIN

## 2025-03-25 ASSESSMENT — PAIN - FUNCTIONAL ASSESSMENT: PAIN_FUNCTIONAL_ASSESSMENT: 0-10

## 2025-03-25 ASSESSMENT — PAIN DESCRIPTION - DESCRIPTORS: DESCRIPTORS: THROBBING

## 2025-03-25 NOTE — PRE-PROCEDURE NOTE
Interventional Radiology Preprocedure Note    Indication for procedure: The encounter diagnosis was Malignant neoplasm of colon, unspecified part of colon (Multi).    Relevant review of systems: NA    Relevant Labs:   Lab Results   Component Value Date    CREATININE 1.21 02/28/2025    EGFR 64 02/28/2025    INR 0.9 01/04/2024    PROTIME 10.5 01/04/2024       Planned Sedation/Anesthesia: Moderate    Airway assessment: normal    Directed physical examination:    RRR, lungs CTA-B    Mallampati: III (soft and hard palate and base of uvula visible)    ASA Score: ASA 2 - Patient with mild systemic disease with no functional limitations    Benefits, risks and alternatives of procedure and planned sedation have been discussed with the patient and/or their representative. All questions answered and they agree to proceed.

## 2025-03-25 NOTE — Clinical Note
Right chest mediport removal completed. Patient tolerated procedure well. Right chest site sutured by Dr. Hawthorne. Dermabond applied. Patient taken back to [facility] for recovery.

## 2025-03-25 NOTE — POST-PROCEDURE NOTE
Interventional Radiology Brief Postprocedure Note    Attending: Juan Hawthorne MD    Assistant:   Staff Role   Juan Hawthorne MD Radiologist   Lebron Alvarado Radiology Technologist   Saima Fox RN Radiology Nurse       Diagnosis:   1. Malignant neoplasm of colon, unspecified part of colon (Multi)  IR CVC port removal    IR CVC port removal          Description of procedure: right chest port removal.    Timeout:  Yes    Procedure Area: Procedure Area     Anesthesia:   Conscious Sedation    Complications: None    Estimated Blood Loss: minimal    Medications (Filter: Administrations occurring from 1349 to 1417 on 03/25/25) As of 03/25/25 1417      oxygen (O2) therapy (L/min) Total volume:  Not documented*   *Total volume has not been documented. View each administration to see the amount administered.     Date/Time Rate/Dose/Volume Action       03/25/25  1359 3 L/min - 180,000 mL/hr New Bag               fentaNYL PF (Sublimaze) injection (mcg) Total dose:  50 mcg      Date/Time Rate/Dose/Volume Action       03/25/25  1405 50 mcg Given               midazolam (Versed) injection (mg) Total dose:  1 mg      Date/Time Rate/Dose/Volume Action       03/25/25  1405 1 mg Given               lidocaine-epinephrine (Xylocaine W/EPI) 1 %-1:100,000 injection (mL) Total volume:  10 mL      Date/Time Rate/Dose/Volume Action       03/25/25  1406 10 mL Given                   No specimens collected      See detailed result report with images in PACS.    The patient tolerated the procedure well without incident or complication and is in stable condition.

## 2025-04-01 ENCOUNTER — EVALUATION (OUTPATIENT)
Dept: PHYSICAL THERAPY | Facility: CLINIC | Age: 70
End: 2025-04-01
Payer: MEDICARE

## 2025-04-01 DIAGNOSIS — M25.571 ACUTE RIGHT ANKLE PAIN: ICD-10-CM

## 2025-04-01 DIAGNOSIS — M76.61 ACHILLES TENDINITIS OF RIGHT LOWER EXTREMITY: ICD-10-CM

## 2025-04-01 DIAGNOSIS — S46.819A STRAIN OF DELTOID MUSCLE, INITIAL ENCOUNTER: ICD-10-CM

## 2025-04-01 PROCEDURE — 97110 THERAPEUTIC EXERCISES: CPT | Mod: GP

## 2025-04-01 PROCEDURE — 97161 PT EVAL LOW COMPLEX 20 MIN: CPT | Mod: GP

## 2025-04-01 ASSESSMENT — PAIN SCALES - GENERAL: PAINLEVEL_OUTOF10: 5 - MODERATE PAIN

## 2025-04-01 ASSESSMENT — ENCOUNTER SYMPTOMS
OCCASIONAL FEELINGS OF UNSTEADINESS: 0
DEPRESSION: 0
LOSS OF SENSATION IN FEET: 1

## 2025-04-01 ASSESSMENT — PAIN - FUNCTIONAL ASSESSMENT: PAIN_FUNCTIONAL_ASSESSMENT: 0-10

## 2025-04-01 NOTE — PROGRESS NOTES
"Physical Therapy     Physical Therapy Evaluation                                        Patient Name: Toby Hicks \"Ed\"  MRN: [id number]  :  1955  Today's Date: 2025  Time Calculation  Start Time: 1431  Stop Time: 1514  Time Calculation (min): 43 min  PT Evaluation Time Entry  PT Evaluation (Low) Time Entry: 31  PT Therapeutic Procedures Time Entry  Therapeutic Exercise Time Entry: 12     Per Chart review: \"Toby \"Ed\" is a 70 y.o. male presents today for evaluation for subacute right ankle injury sustained about six weeks ago after he fell two times in that period. Initially he missed a step and fell and then he slipped on ice and fell. He notes persistent right ankle pain. Right ankle shows skin is intact.  There is no erythema or warmth.  There is no clinical signs of infection.  There is no pain over the lateral malleolus.  There is no pain of the medial malleolus.  There is no pain over the ATF, CF or PTF ligament.  There is pain over the deltoid ligament.  Mild pain over the Achilles tendon.  Negative Riddle's test.  Negative squeeze test.  Negative anterior drawer test.  Negative talar tilt test.  No pain over the anterior process of the talus.  There is no pain over the talar dome.  There is no pain at the base of the fifth metatarsal bone.  No pain of the calcaneus.  No pain over the plantar aponeurosis.  No pain of the midfoot.  Neurovascularly intact.\"  Reason for Visit  Referred by: Michael Camacho MD  Referral DX date: 3/12/25     Diagnosis:  1. Acute right ankle pain    2. Strain of deltoid muscle, initial encounter    3. Achilles tendinitis of right lower extremity        Insurance:  Visit: #1  POC: 8  Authorized: *Pending*  Certification: 2025 to  25  Payor: [employer] MEDICARE / Plan: [employer] MEDICARE / Product Type: *No Product type* /     Subjective:  Missed a step and over pronates, with pain on medial and anterior ankle. Arrives wearing ankle brace on R " foot.   Current Episode  Date of Onset:  Jan 31, 2025  Mechanism of injury:  slipping on step   Chief Complaint:  pain  Relevant incidents/injuries:  none  Progression of symptoms:  getting better   Previous treatments:  none  Relevant medical history:  DM, neuropathy     Prior level of function: IND  Functional limitations: walking  Home environment: 1 step to get in, 5 steps each level of a split level home. Tub to step over.   Work status/occupation: Retired  Recreational activity: walking     Patient stated goals for treatment: decrease pain, walk longer without pain or brace     Reviewed medical screening history form with patient.     Precautions: CIPN, DM,   Precautions  STEADI Fall Risk Score (The score of 4 or more indicates an increased risk of falling): 5        Pain:  Location: R ankle  Current pain: 5/10; Range: 0-10/10  Aggravating factor: sitting too long, walking  Alleviating factor: rest, ice, stretch     Objective:  Observation/Posture: decreased lordosis in lumbar     AROM:   Lower Extremity Right Left   Hip Flex WNL   Hip Ext    Knee Flex    Knee Ext    DF    PF    Hip Abd    Hip Add            PROM/mobility:   Lower Extremity Right Left   Hip Flex WNL        - 8 deg                                       5 deg    21 deg                                    24 deg   Hip Ext    Knee Flex    Knee Ext    DF    PF    Hip Abd    Hip Add           MMT:   Lower Extremity Right Left   Hip Flex 4/5 4/5   Hip Ext 4/5 4-/5   Knee Flex 4+/5 4+/5   Knee Ext 4+/5 4+/5   DF 4/5 4/5   PF 4+/5 4+/5   Hip Abd 4+/5 4+/5   Hip Add 4+/5 4+/5        Balance:   Semi tandem <6 sec     Gait:   R toe out, antalgic gait on R, decreased step length bilat     Functional testing:   Normal squat    Sensation:   Patient reports numbness/tingling of bilateral LOWER extremities.  Light Touch: normal    Coordination:  Heel to shin: normal     Outcome Measure:   Other Measures  5x Sit to Stand: 12.4  Lower Extremity Funtional Score  (LEFS): 42       Treatment:  Provided education regarding POC, goals created, reasoning for assessments performed and results of those assessments. Additionally, provided education regarding scheduling proposal with pt asked about scheduling preferences, call in/no show policy, and pt provides verbal confirmation of understanding.  seated Toe Towel Scrunches: 2x20 curls  Ankle Inversion with Resistance:      OP Education: HEP:   Access Code: [email or web address]  URL: [email or web address]  Date: 04/01/2025  Prepared by: Leroy Curtis    Exercises  - Seated Toe Towel Scrunches  - 1 x daily - 5 x weekly - 3 sets - 25 reps  - Ankle Inversion with Resistance  - 1 x daily - 3 x weekly - 3 sets - 12 reps     Assessment:  Patient is a 70 y.o. MALE presents to [facility] for assessment and treatment of mobility related impairments s/p fall and ankle sprain. complicated by high pain with activity.   Patient is alert and oriented x 3.  Patient presents with medical diagnosis of R ankle pain contributing to compensatory soft tissue dysfunction, pain, stiffness and weakness of the R LE. Significant past medical history/past surgical history includes CA, DM, neuropathy. Skilled care is needed to progress the patient back to these activities without exacerbating symptoms. Patient requires skilled PT services to address the problems identified and the individualized patient's goals as outlined in the problems and goals section of this evaluation. A skilled PT is required to address these key impairments and to provide and progress with an appropriate home exercise program. Patient does have significant PMH influencing Rx and reports motivation to return to [facility]. Patient demonstrates to be a good candidate for physical therapy with good rehab potential and verbalized a good understanding of their diagnosis, prognosis and treatment. Pt is motivated and has strong family support which reinforces  their potential for improvement.  Pt would benefit from skilled physical therapy to  improve strength, balance, decrease pain, and decrease fall risk.  Goals have been established and reviewed with the patient.      PT Assessment Results: Decreased strength, Decreased range of motion, Decreased endurance, Impaired balance, Pain  Rehab Prognosis: Good       Plan:  Frequency/duration: 1x/week for 8 weeks  Treatment/Interventions: Dry needling, Blood flow restriction therapy, Education/ Instruction, Electrical stimulation, Fluidotherapy, Gait training, Mechanical traction, Manual therapy, Neuromuscular re-education, Self care/ home management, Taping techniques, Therapeutic exercises, Therapeutic activities, Ultrasound, Vasopneumatic device  PT Plan: Skilled PT  PT Frequency: 1 time per week  Duration: 8 weeks  Onset Date: 01/31/25  Certification Period Start Date: 04/01/25  Certification Period End Date: 06/30/25             Goals:  Understand and demonstrate a home exercise program that will support and continue the process of neural plasticity resulting in continued improvement of lower extremity function, increased mobility, and safety.  Pt will complete KATE balance score to 45/56 or higher indicating improve static balance and decrease risk for falls  Patient will demonstrate an increased score in LEFS score by 9 points to </=  51/80 to meet established MCID for the outcome measure (baseline 4/1/2025 : 42/80).   Pt will report walking for 30 min or longer with no reported pain in medial R ankle to demo improved pain management and endurance.   Pt will improve 5xSTS score by 2 sec or better to demo improved LE strength in order to transfer and ambulate with reduced risk of falling (baseline 4/1/2025: 12.41sec)     Complexity:  Low complexity evaluation  due to a past medical history WITH personal factors and/or comorbidities that could impact the POC, examination of body systems completed on one to two elements,  the patient presents with a stable condition, and clinical decision making.    Leroy Curtis, PT  4/1/2025

## 2025-04-10 ENCOUNTER — APPOINTMENT (OUTPATIENT)
Dept: ORTHOPEDIC SURGERY | Facility: CLINIC | Age: 70
End: 2025-04-10
Payer: MEDICARE

## 2025-04-22 ENCOUNTER — PATIENT MESSAGE (OUTPATIENT)
Dept: PRIMARY CARE | Facility: CLINIC | Age: 70
End: 2025-04-22
Payer: MEDICARE

## 2025-04-22 ENCOUNTER — TELEPHONE (OUTPATIENT)
Dept: PRIMARY CARE | Facility: CLINIC | Age: 70
End: 2025-04-22
Payer: MEDICARE

## 2025-04-22 DIAGNOSIS — E11.69 TYPE 2 DIABETES MELLITUS WITH OTHER SPECIFIED COMPLICATION, UNSPECIFIED WHETHER LONG TERM INSULIN USE (MULTI): ICD-10-CM

## 2025-04-22 NOTE — TELEPHONE ENCOUNTER
Anand Adkins MD to Do [id number] [facility] Clinical Support Staff (Selected Message)        4/22/25  4:53 PM  Please order hemoglobin A1c    ordered

## 2025-04-22 NOTE — TELEPHONE ENCOUNTER
"Please advise if okay for A1c  Previously ordered back in December.     Toby Hicks \"Ed\" to [id number] [place] [id number] Clinical Support Staff (supporting Anand Adkins MD) (Selected Message)        4/22/25  8:46 AM  Hello  The last time i was in you said to have A1-C test in April.  The question is does [facility] have an order for the test  Thanks  Ed  "

## 2025-04-24 ENCOUNTER — TELEPHONE (OUTPATIENT)
Dept: PRIMARY CARE | Facility: CLINIC | Age: 70
End: 2025-04-24
Payer: MEDICARE

## 2025-04-24 LAB
EST. AVERAGE GLUCOSE BLD GHB EST-MCNC: 171 MG/DL
EST. AVERAGE GLUCOSE BLD GHB EST-SCNC: 9.5 MMOL/L
HBA1C MFR BLD: 7.6 %

## 2025-04-24 NOTE — TELEPHONE ENCOUNTER
"PLEASE ADVISE    Toby Hicks \"Ed\" to P [id number] [place] [id number] Clinical Support Staff (supporting Anand Adkins MD) (Selected Message)        4/24/25 11:30 AM  Question can the daily use of insulin at 40 be causing a weight going up my feet, ankles and neck  look swollen  Thank  Ed  "

## 2025-04-24 NOTE — TELEPHONE ENCOUNTER
Anand Adkins MD to Do [id number] [facility] Clinical Support Staff (Selected Message)        4/24/25 12:23 PM  Insulin will not cause this reaction.

## 2025-05-16 ENCOUNTER — HOSPITAL ENCOUNTER (OUTPATIENT)
Dept: CARDIOLOGY | Facility: CLINIC | Age: 70
Discharge: HOME | End: 2025-05-16
Payer: MEDICARE

## 2025-05-16 VITALS
DIASTOLIC BLOOD PRESSURE: 86 MMHG | HEIGHT: 70 IN | SYSTOLIC BLOOD PRESSURE: 148 MMHG | BODY MASS INDEX: 35.79 KG/M2 | WEIGHT: 250 LBS

## 2025-05-16 DIAGNOSIS — M79.89 OTHER SPECIFIED SOFT TISSUE DISORDERS: ICD-10-CM

## 2025-05-16 LAB — BODY SURFACE AREA: 2.37 M2

## 2025-05-16 PROCEDURE — 93306 TTE W/DOPPLER COMPLETE: CPT

## 2025-05-21 LAB
AORTIC VALVE MEAN GRADIENT: 4 MMHG
AORTIC VALVE PEAK VELOCITY: 1.19 M/S
AV PEAK GRADIENT: 6 MMHG
AVA (PEAK VEL): 3.08 CM2
AVA (VTI): 2.87 CM2
EJECTION FRACTION APICAL 4 CHAMBER: 53.6
EJECTION FRACTION: 58 %
LEFT ATRIUM VOLUME AREA LENGTH INDEX BSA: 19.9 ML/M2
LEFT VENTRICLE INTERNAL DIMENSION DIASTOLE: 5.56 CM (ref 3.5–6)
LEFT VENTRICULAR OUTFLOW TRACT DIAMETER: 2.13 CM
LV EJECTION FRACTION BIPLANE: 55 %
MITRAL VALVE E/A RATIO: 0.66
RIGHT VENTRICLE PEAK SYSTOLIC PRESSURE: 28 MMHG
TRICUSPID ANNULAR PLANE SYSTOLIC EXCURSION: 2.1 CM

## 2025-06-17 DIAGNOSIS — E11.9 TYPE 2 DIABETES MELLITUS WITHOUT COMPLICATION, WITHOUT LONG-TERM CURRENT USE OF INSULIN: ICD-10-CM

## 2025-06-17 DIAGNOSIS — I10 HYPERTENSION, UNSPECIFIED TYPE: ICD-10-CM

## 2025-06-19 RX ORDER — LOSARTAN POTASSIUM 50 MG/1
100 TABLET ORAL DAILY
Qty: 180 TABLET | Refills: 0 | Status: SHIPPED | OUTPATIENT
Start: 2025-06-19

## 2025-06-19 RX ORDER — METFORMIN HYDROCHLORIDE 500 MG/1
1000 TABLET, EXTENDED RELEASE ORAL 2 TIMES DAILY
Qty: 360 TABLET | Refills: 0 | Status: SHIPPED | OUTPATIENT
Start: 2025-06-19

## 2025-06-19 RX ORDER — GLIMEPIRIDE 4 MG/1
4 TABLET ORAL 2 TIMES DAILY
Qty: 180 TABLET | Refills: 0 | Status: SHIPPED | OUTPATIENT
Start: 2025-06-19

## 2025-06-24 DIAGNOSIS — E11.9 TYPE 2 DIABETES MELLITUS WITHOUT COMPLICATION, WITH LONG-TERM CURRENT USE OF INSULIN: ICD-10-CM

## 2025-06-24 DIAGNOSIS — Z79.4 TYPE 2 DIABETES MELLITUS WITHOUT COMPLICATION, WITH LONG-TERM CURRENT USE OF INSULIN: ICD-10-CM

## 2025-06-25 RX ORDER — BLOOD-GLUCOSE METER
EACH MISCELLANEOUS
Qty: 200 STRIP | Refills: 2 | Status: SHIPPED | OUTPATIENT
Start: 2025-06-25

## 2025-07-20 ENCOUNTER — OFFICE VISIT (OUTPATIENT)
Dept: URGENT CARE | Age: 70
End: 2025-07-20
Payer: MEDICARE

## 2025-07-20 VITALS
WEIGHT: 250 LBS | OXYGEN SATURATION: 96 % | HEIGHT: 70 IN | HEART RATE: 89 BPM | RESPIRATION RATE: 18 BRPM | BODY MASS INDEX: 35.79 KG/M2 | DIASTOLIC BLOOD PRESSURE: 91 MMHG | TEMPERATURE: 98 F | SYSTOLIC BLOOD PRESSURE: 176 MMHG

## 2025-07-20 DIAGNOSIS — L23.7 POISON IVY DERMATITIS: Primary | ICD-10-CM

## 2025-07-20 RX ORDER — CETIRIZINE HYDROCHLORIDE 10 MG/1
10 TABLET ORAL DAILY
Qty: 30 TABLET | Refills: 0 | Status: SHIPPED | OUTPATIENT
Start: 2025-07-20 | End: 2026-07-20

## 2025-07-20 RX ORDER — METHYLPREDNISOLONE SODIUM SUCCINATE 125 MG/2ML
125 INJECTION INTRAMUSCULAR; INTRAVENOUS ONCE
Status: COMPLETED | OUTPATIENT
Start: 2025-07-20 | End: 2025-07-20

## 2025-07-20 RX ORDER — TRIAMCINOLONE ACETONIDE 1 MG/G
CREAM TOPICAL 2 TIMES DAILY
Qty: 30 G | Refills: 0 | Status: SHIPPED | OUTPATIENT
Start: 2025-07-20

## 2025-07-20 RX ORDER — METHYLPREDNISOLONE 4 MG/1
TABLET ORAL
Qty: 21 TABLET | Refills: 0 | Status: SHIPPED | OUTPATIENT
Start: 2025-07-20 | End: 2025-07-26

## 2025-07-20 RX ADMIN — METHYLPREDNISOLONE SODIUM SUCCINATE 125 MG: 125 INJECTION INTRAMUSCULAR; INTRAVENOUS at 15:44

## 2025-07-20 ASSESSMENT — ENCOUNTER SYMPTOMS
NEUROLOGICAL NEGATIVE: 1
EYES NEGATIVE: 1
MUSCULOSKELETAL NEGATIVE: 1
PALPITATIONS: 0
HEMATOLOGIC/LYMPHATIC NEGATIVE: 1
GASTROINTESTINAL NEGATIVE: 1
RESPIRATORY NEGATIVE: 1
CONSTITUTIONAL NEGATIVE: 1
ENDOCRINE NEGATIVE: 1
ALLERGIC/IMMUNOLOGIC NEGATIVE: 1
PSYCHIATRIC NEGATIVE: 1
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
## 2025-08-07 DIAGNOSIS — E11.69 TYPE 2 DIABETES MELLITUS WITH OTHER SPECIFIED COMPLICATION, UNSPECIFIED WHETHER LONG TERM INSULIN USE (MULTI): ICD-10-CM

## 2025-09-04 ENCOUNTER — LAB (OUTPATIENT)
Dept: LAB | Facility: CLINIC | Age: 70
End: 2025-09-04
Payer: MEDICARE

## 2025-09-04 DIAGNOSIS — C18.9 MALIGNANT NEOPLASM OF COLON, UNSPECIFIED PART OF COLON (MULTI): ICD-10-CM

## 2025-09-04 LAB
ALBUMIN SERPL BCP-MCNC: 4.3 G/DL (ref 3.4–5)
ALP SERPL-CCNC: 60 U/L (ref 33–136)
ALT SERPL W P-5'-P-CCNC: 13 U/L (ref 10–52)
ANION GAP SERPL CALC-SCNC: 15 MMOL/L (ref 10–20)
AST SERPL W P-5'-P-CCNC: 12 U/L (ref 9–39)
BASOPHILS # BLD AUTO: 0.03 X10*3/UL (ref 0–0.1)
BASOPHILS NFR BLD AUTO: 0.2 %
BILIRUB SERPL-MCNC: 0.7 MG/DL (ref 0–1.2)
BUN SERPL-MCNC: 21 MG/DL (ref 6–23)
CALCIUM SERPL-MCNC: 9.9 MG/DL (ref 8.6–10.3)
CHLORIDE SERPL-SCNC: 105 MMOL/L (ref 98–107)
CO2 SERPL-SCNC: 24 MMOL/L (ref 21–32)
CREAT SERPL-MCNC: 1.42 MG/DL (ref 0.5–1.3)
EGFRCR SERPLBLD CKD-EPI 2021: 53 ML/MIN/1.73M*2
EOSINOPHIL # BLD AUTO: 0.09 X10*3/UL (ref 0–0.7)
EOSINOPHIL NFR BLD AUTO: 0.7 %
ERYTHROCYTE [DISTWIDTH] IN BLOOD BY AUTOMATED COUNT: 14 % (ref 11.5–14.5)
GLUCOSE SERPL-MCNC: 172 MG/DL (ref 74–99)
HCT VFR BLD AUTO: 43.4 % (ref 41–52)
HGB BLD-MCNC: 14.5 G/DL (ref 13.5–17.5)
IMM GRANULOCYTES # BLD AUTO: 0.02 X10*3/UL (ref 0–0.7)
IMM GRANULOCYTES NFR BLD AUTO: 0.1 % (ref 0–0.9)
LYMPHOCYTES # BLD AUTO: 1.88 X10*3/UL (ref 1.2–4.8)
LYMPHOCYTES NFR BLD AUTO: 14 %
MCH RBC QN AUTO: 29.2 PG (ref 26–34)
MCHC RBC AUTO-ENTMCNC: 33.4 G/DL (ref 32–36)
MCV RBC AUTO: 87 FL (ref 80–100)
MONOCYTES # BLD AUTO: 0.85 X10*3/UL (ref 0.1–1)
MONOCYTES NFR BLD AUTO: 6.3 %
NEUTROPHILS # BLD AUTO: 10.58 X10*3/UL (ref 1.2–7.7)
NEUTROPHILS NFR BLD AUTO: 78.7 %
PLATELET # BLD AUTO: 288 X10*3/UL (ref 150–450)
POTASSIUM SERPL-SCNC: 4.1 MMOL/L (ref 3.5–5.3)
PROT SERPL-MCNC: 7.5 G/DL (ref 6.4–8.2)
RBC # BLD AUTO: 4.97 X10*6/UL (ref 4.5–5.9)
SODIUM SERPL-SCNC: 140 MMOL/L (ref 136–145)
WBC # BLD AUTO: 13.5 X10*3/UL (ref 4.4–11.3)

## 2025-09-04 PROCEDURE — 80053 COMPREHEN METABOLIC PANEL: CPT

## 2025-09-04 PROCEDURE — 36415 COLL VENOUS BLD VENIPUNCTURE: CPT

## 2025-09-04 PROCEDURE — 85025 COMPLETE CBC W/AUTO DIFF WBC: CPT

## 2025-09-04 PROCEDURE — 82378 CARCINOEMBRYONIC ANTIGEN: CPT

## 2025-09-05 ENCOUNTER — TELEPHONE (OUTPATIENT)
Dept: PRIMARY CARE | Facility: CLINIC | Age: 70
End: 2025-09-05
Payer: MEDICARE

## 2025-09-05 LAB — CEA SERPL-MCNC: 3.1 UG/L

## 2025-09-06 ENCOUNTER — HOSPITAL ENCOUNTER (OUTPATIENT)
Facility: HOSPITAL | Age: 70
Setting detail: OBSERVATION
Discharge: HOME | End: 2025-09-07
Attending: STUDENT IN AN ORGANIZED HEALTH CARE EDUCATION/TRAINING PROGRAM | Admitting: STUDENT IN AN ORGANIZED HEALTH CARE EDUCATION/TRAINING PROGRAM
Payer: MEDICARE

## 2025-09-06 ENCOUNTER — OFFICE VISIT (OUTPATIENT)
Age: 70
End: 2025-09-06
Payer: MEDICARE

## 2025-09-06 VITALS
BODY MASS INDEX: 35.79 KG/M2 | HEIGHT: 70 IN | HEART RATE: 85 BPM | RESPIRATION RATE: 20 BRPM | SYSTOLIC BLOOD PRESSURE: 151 MMHG | DIASTOLIC BLOOD PRESSURE: 90 MMHG | OXYGEN SATURATION: 97 % | TEMPERATURE: 97.3 F | WEIGHT: 250 LBS

## 2025-09-06 DIAGNOSIS — K59.00 CONSTIPATION, UNSPECIFIED CONSTIPATION TYPE: ICD-10-CM

## 2025-09-06 DIAGNOSIS — R10.30 LOWER ABDOMINAL PAIN: Primary | ICD-10-CM

## 2025-09-06 DIAGNOSIS — N13.2 HYDRONEPHROSIS WITH RENAL AND URETERAL CALCULUS OBSTRUCTION: ICD-10-CM

## 2025-09-06 DIAGNOSIS — N17.9 AKI (ACUTE KIDNEY INJURY): Primary | ICD-10-CM

## 2025-09-06 PROBLEM — N20.0 NEPHROLITHIASIS: Status: ACTIVE | Noted: 2025-09-06

## 2025-09-06 LAB
ALBUMIN SERPL BCP-MCNC: 4.3 G/DL (ref 3.4–5)
ALP SERPL-CCNC: 59 U/L (ref 33–136)
ALT SERPL W P-5'-P-CCNC: 14 U/L (ref 10–52)
ANION GAP SERPL CALC-SCNC: 13 MMOL/L (ref 10–20)
APPEARANCE UR: CLEAR
AST SERPL W P-5'-P-CCNC: 13 U/L (ref 9–39)
BASOPHILS # BLD AUTO: 0.03 X10*3/UL (ref 0–0.1)
BASOPHILS NFR BLD AUTO: 0.3 %
BILIRUB SERPL-MCNC: 0.7 MG/DL (ref 0–1.2)
BILIRUB UR STRIP.AUTO-MCNC: NEGATIVE MG/DL
BUN SERPL-MCNC: 30 MG/DL (ref 6–23)
CALCIUM SERPL-MCNC: 9.9 MG/DL (ref 8.6–10.3)
CHLORIDE SERPL-SCNC: 101 MMOL/L (ref 98–107)
CO2 SERPL-SCNC: 26 MMOL/L (ref 21–32)
COLOR UR: ABNORMAL
CREAT SERPL-MCNC: 1.96 MG/DL (ref 0.5–1.3)
EGFRCR SERPLBLD CKD-EPI 2021: 36 ML/MIN/1.73M*2
EOSINOPHIL # BLD AUTO: 0.25 X10*3/UL (ref 0–0.7)
EOSINOPHIL NFR BLD AUTO: 2.3 %
ERYTHROCYTE [DISTWIDTH] IN BLOOD BY AUTOMATED COUNT: 13.9 % (ref 11.5–14.5)
GLUCOSE BLD MANUAL STRIP-MCNC: 139 MG/DL (ref 74–99)
GLUCOSE BLD MANUAL STRIP-MCNC: 236 MG/DL (ref 74–99)
GLUCOSE SERPL-MCNC: 153 MG/DL (ref 74–99)
GLUCOSE UR STRIP.AUTO-MCNC: ABNORMAL MG/DL
HCT VFR BLD AUTO: 43.6 % (ref 41–52)
HGB BLD-MCNC: 14.8 G/DL (ref 13.5–17.5)
IMM GRANULOCYTES # BLD AUTO: 0.03 X10*3/UL (ref 0–0.7)
IMM GRANULOCYTES NFR BLD AUTO: 0.3 % (ref 0–0.9)
KETONES UR STRIP.AUTO-MCNC: ABNORMAL MG/DL
LEUKOCYTE ESTERASE UR QL STRIP.AUTO: NEGATIVE
LYMPHOCYTES # BLD AUTO: 1.83 X10*3/UL (ref 1.2–4.8)
LYMPHOCYTES NFR BLD AUTO: 16.5 %
MCH RBC QN AUTO: 29.4 PG (ref 26–34)
MCHC RBC AUTO-ENTMCNC: 33.9 G/DL (ref 32–36)
MCV RBC AUTO: 87 FL (ref 80–100)
MONOCYTES # BLD AUTO: 0.82 X10*3/UL (ref 0.1–1)
MONOCYTES NFR BLD AUTO: 7.4 %
MUCOUS THREADS #/AREA URNS AUTO: NORMAL /LPF
NEUTROPHILS # BLD AUTO: 8.15 X10*3/UL (ref 1.2–7.7)
NEUTROPHILS NFR BLD AUTO: 73.2 %
NITRITE UR QL STRIP.AUTO: NEGATIVE
NRBC BLD-RTO: 0 /100 WBCS (ref 0–0)
PH UR STRIP.AUTO: 5 [PH]
PLATELET # BLD AUTO: 257 X10*3/UL (ref 150–450)
POTASSIUM SERPL-SCNC: 4.1 MMOL/L (ref 3.5–5.3)
PROT SERPL-MCNC: 7.7 G/DL (ref 6.4–8.2)
PROT UR STRIP.AUTO-MCNC: ABNORMAL MG/DL
RBC # BLD AUTO: 5.04 X10*6/UL (ref 4.5–5.9)
RBC # UR STRIP.AUTO: NEGATIVE MG/DL
RBC #/AREA URNS AUTO: NORMAL /HPF
SODIUM SERPL-SCNC: 136 MMOL/L (ref 136–145)
SP GR UR STRIP.AUTO: 1.03
UROBILINOGEN UR STRIP.AUTO-MCNC: NORMAL MG/DL
WBC # BLD AUTO: 11.1 X10*3/UL (ref 4.4–11.3)
WBC #/AREA URNS AUTO: NORMAL /HPF

## 2025-09-06 PROCEDURE — G0378 HOSPITAL OBSERVATION PER HR: HCPCS

## 2025-09-06 PROCEDURE — 82947 ASSAY GLUCOSE BLOOD QUANT: CPT

## 2025-09-06 PROCEDURE — 2500000004 HC RX 250 GENERAL PHARMACY W/ HCPCS (ALT 636 FOR OP/ED): Performed by: STUDENT IN AN ORGANIZED HEALTH CARE EDUCATION/TRAINING PROGRAM

## 2025-09-06 PROCEDURE — 2500000004 HC RX 250 GENERAL PHARMACY W/ HCPCS (ALT 636 FOR OP/ED)

## 2025-09-06 PROCEDURE — 2500000002 HC RX 250 W HCPCS SELF ADMINISTERED DRUGS (ALT 637 FOR MEDICARE OP, ALT 636 FOR OP/ED): Performed by: STUDENT IN AN ORGANIZED HEALTH CARE EDUCATION/TRAINING PROGRAM

## 2025-09-06 PROCEDURE — 99285 EMERGENCY DEPT VISIT HI MDM: CPT | Mod: 25 | Performed by: STUDENT IN AN ORGANIZED HEALTH CARE EDUCATION/TRAINING PROGRAM

## 2025-09-06 PROCEDURE — 81001 URINALYSIS AUTO W/SCOPE: CPT | Performed by: STUDENT IN AN ORGANIZED HEALTH CARE EDUCATION/TRAINING PROGRAM

## 2025-09-06 PROCEDURE — 2550000001 HC RX 255 CONTRASTS: Performed by: STUDENT IN AN ORGANIZED HEALTH CARE EDUCATION/TRAINING PROGRAM

## 2025-09-06 PROCEDURE — 96372 THER/PROPH/DIAG INJ SC/IM: CPT | Performed by: STUDENT IN AN ORGANIZED HEALTH CARE EDUCATION/TRAINING PROGRAM

## 2025-09-06 PROCEDURE — 85025 COMPLETE CBC W/AUTO DIFF WBC: CPT | Performed by: STUDENT IN AN ORGANIZED HEALTH CARE EDUCATION/TRAINING PROGRAM

## 2025-09-06 PROCEDURE — 99223 1ST HOSP IP/OBS HIGH 75: CPT | Performed by: STUDENT IN AN ORGANIZED HEALTH CARE EDUCATION/TRAINING PROGRAM

## 2025-09-06 PROCEDURE — 36415 COLL VENOUS BLD VENIPUNCTURE: CPT | Performed by: STUDENT IN AN ORGANIZED HEALTH CARE EDUCATION/TRAINING PROGRAM

## 2025-09-06 PROCEDURE — 80053 COMPREHEN METABOLIC PANEL: CPT | Performed by: STUDENT IN AN ORGANIZED HEALTH CARE EDUCATION/TRAINING PROGRAM

## 2025-09-06 PROCEDURE — 99221 1ST HOSP IP/OBS SF/LOW 40: CPT

## 2025-09-06 RX ORDER — MORPHINE SULFATE 4 MG/ML
8 INJECTION, SOLUTION INTRAMUSCULAR; INTRAVENOUS ONCE
Status: DISCONTINUED | OUTPATIENT
Start: 2025-09-06 | End: 2025-09-06

## 2025-09-06 RX ORDER — ACETAMINOPHEN 325 MG/1
650 TABLET ORAL EVERY 4 HOURS PRN
Status: DISCONTINUED | OUTPATIENT
Start: 2025-09-06 | End: 2025-09-07 | Stop reason: HOSPADM

## 2025-09-06 RX ORDER — HEPARIN SODIUM 5000 [USP'U]/ML
5000 INJECTION, SOLUTION INTRAVENOUS; SUBCUTANEOUS EVERY 8 HOURS
Status: DISCONTINUED | OUTPATIENT
Start: 2025-09-06 | End: 2025-09-07 | Stop reason: HOSPADM

## 2025-09-06 RX ORDER — FENTANYL CITRATE 50 UG/ML
50 INJECTION, SOLUTION INTRAMUSCULAR; INTRAVENOUS ONCE
Status: COMPLETED | OUTPATIENT
Start: 2025-09-06 | End: 2025-09-06

## 2025-09-06 RX ORDER — MORPHINE SULFATE 4 MG/ML
4 INJECTION, SOLUTION INTRAMUSCULAR; INTRAVENOUS EVERY 4 HOURS PRN
Status: DISCONTINUED | OUTPATIENT
Start: 2025-09-06 | End: 2025-09-07 | Stop reason: HOSPADM

## 2025-09-06 RX ORDER — DEXTROSE 50 % IN WATER (D50W) INTRAVENOUS SYRINGE
12.5
Status: DISCONTINUED | OUTPATIENT
Start: 2025-09-06 | End: 2025-09-07 | Stop reason: HOSPADM

## 2025-09-06 RX ORDER — FENTANYL CITRATE 50 UG/ML
INJECTION, SOLUTION INTRAMUSCULAR; INTRAVENOUS
Status: COMPLETED
Start: 2025-09-06 | End: 2025-09-06

## 2025-09-06 RX ORDER — ACETAMINOPHEN 650 MG/1
650 SUPPOSITORY RECTAL EVERY 4 HOURS PRN
Status: DISCONTINUED | OUTPATIENT
Start: 2025-09-06 | End: 2025-09-07 | Stop reason: HOSPADM

## 2025-09-06 RX ORDER — ACETAMINOPHEN 160 MG/5ML
650 SOLUTION ORAL EVERY 4 HOURS PRN
Status: DISCONTINUED | OUTPATIENT
Start: 2025-09-06 | End: 2025-09-07 | Stop reason: HOSPADM

## 2025-09-06 RX ORDER — SODIUM CHLORIDE, SODIUM LACTATE, POTASSIUM CHLORIDE, CALCIUM CHLORIDE 600; 310; 30; 20 MG/100ML; MG/100ML; MG/100ML; MG/100ML
150 INJECTION, SOLUTION INTRAVENOUS CONTINUOUS
Status: DISCONTINUED | OUTPATIENT
Start: 2025-09-06 | End: 2025-09-07 | Stop reason: HOSPADM

## 2025-09-06 RX ORDER — MORPHINE SULFATE 2 MG/ML
2 INJECTION, SOLUTION INTRAMUSCULAR; INTRAVENOUS EVERY 4 HOURS PRN
Status: DISCONTINUED | OUTPATIENT
Start: 2025-09-06 | End: 2025-09-07 | Stop reason: HOSPADM

## 2025-09-06 RX ORDER — ONDANSETRON HYDROCHLORIDE 2 MG/ML
4 INJECTION, SOLUTION INTRAVENOUS ONCE
Status: COMPLETED | OUTPATIENT
Start: 2025-09-06 | End: 2025-09-06

## 2025-09-06 RX ORDER — DEXTROSE 50 % IN WATER (D50W) INTRAVENOUS SYRINGE
25
Status: DISCONTINUED | OUTPATIENT
Start: 2025-09-06 | End: 2025-09-07 | Stop reason: HOSPADM

## 2025-09-06 RX ORDER — TAMSULOSIN HYDROCHLORIDE 0.4 MG/1
0.8 CAPSULE ORAL DAILY
Status: DISCONTINUED | OUTPATIENT
Start: 2025-09-06 | End: 2025-09-07 | Stop reason: HOSPADM

## 2025-09-06 RX ORDER — INSULIN LISPRO 100 [IU]/ML
0-10 INJECTION, SOLUTION INTRAVENOUS; SUBCUTANEOUS
Status: DISCONTINUED | OUTPATIENT
Start: 2025-09-06 | End: 2025-09-07 | Stop reason: HOSPADM

## 2025-09-06 RX ORDER — POLYETHYLENE GLYCOL 3350 17 G/17G
17 POWDER, FOR SOLUTION ORAL DAILY
Status: DISCONTINUED | OUTPATIENT
Start: 2025-09-06 | End: 2025-09-07 | Stop reason: HOSPADM

## 2025-09-06 RX ADMIN — FENTANYL CITRATE 50 MCG: 50 INJECTION, SOLUTION INTRAMUSCULAR; INTRAVENOUS at 14:27

## 2025-09-06 RX ADMIN — POLYETHYLENE GLYCOL 3350 17 G: 17 POWDER, FOR SOLUTION ORAL at 18:06

## 2025-09-06 RX ADMIN — HEPARIN SODIUM 5000 UNITS: 5000 INJECTION, SOLUTION INTRAVENOUS; SUBCUTANEOUS at 20:29

## 2025-09-06 RX ADMIN — SODIUM CHLORIDE, SODIUM LACTATE, POTASSIUM CHLORIDE, AND CALCIUM CHLORIDE 150 ML/HR: .6; .31; .03; .02 INJECTION, SOLUTION INTRAVENOUS at 17:34

## 2025-09-06 RX ADMIN — TAMSULOSIN HYDROCHLORIDE 0.8 MG: 0.4 CAPSULE ORAL at 18:06

## 2025-09-06 RX ADMIN — IOHEXOL 75 ML: 350 INJECTION, SOLUTION INTRAVENOUS at 14:51

## 2025-09-06 RX ADMIN — ONDANSETRON 4 MG: 2 INJECTION, SOLUTION INTRAMUSCULAR; INTRAVENOUS at 14:28

## 2025-09-06 SDOH — SOCIAL STABILITY: SOCIAL INSECURITY: WITHIN THE LAST YEAR, HAVE YOU BEEN AFRAID OF YOUR PARTNER OR EX-PARTNER?: NO

## 2025-09-06 SDOH — HEALTH STABILITY: MENTAL HEALTH: HOW OFTEN DO YOU HAVE SIX OR MORE DRINKS ON ONE OCCASION?: NEVER

## 2025-09-06 SDOH — SOCIAL STABILITY: SOCIAL INSECURITY
WITHIN THE LAST YEAR, HAVE YOU BEEN KICKED, HIT, SLAPPED, OR OTHERWISE PHYSICALLY HURT BY YOUR PARTNER OR EX-PARTNER?: NO

## 2025-09-06 SDOH — SOCIAL STABILITY: SOCIAL INSECURITY: ARE THERE ANY APPARENT SIGNS OF INJURIES/BEHAVIORS THAT COULD BE RELATED TO ABUSE/NEGLECT?: NO

## 2025-09-06 SDOH — HEALTH STABILITY: MENTAL HEALTH: HOW MANY DRINKS CONTAINING ALCOHOL DO YOU HAVE ON A TYPICAL DAY WHEN YOU ARE DRINKING?: PATIENT DOES NOT DRINK

## 2025-09-06 SDOH — HEALTH STABILITY: MENTAL HEALTH: HOW OFTEN DO YOU HAVE A DRINK CONTAINING ALCOHOL?: NEVER

## 2025-09-06 SDOH — SOCIAL STABILITY: SOCIAL INSECURITY: HAVE YOU HAD THOUGHTS OF HARMING ANYONE ELSE?: NO

## 2025-09-06 SDOH — SOCIAL STABILITY: SOCIAL INSECURITY: WITHIN THE LAST YEAR, HAVE YOU BEEN HUMILIATED OR EMOTIONALLY ABUSED IN OTHER WAYS BY YOUR PARTNER OR EX-PARTNER?: NO

## 2025-09-06 SDOH — SOCIAL STABILITY: SOCIAL INSECURITY: WERE YOU ABLE TO COMPLETE ALL THE BEHAVIORAL HEALTH SCREENINGS?: YES

## 2025-09-06 SDOH — ECONOMIC STABILITY: FOOD INSECURITY: WITHIN THE PAST 12 MONTHS, YOU WORRIED THAT YOUR FOOD WOULD RUN OUT BEFORE YOU GOT THE MONEY TO BUY MORE.: NEVER TRUE

## 2025-09-06 SDOH — SOCIAL STABILITY: SOCIAL INSECURITY: DO YOU FEEL UNSAFE GOING BACK TO THE PLACE WHERE YOU ARE LIVING?: NO

## 2025-09-06 SDOH — ECONOMIC STABILITY: INCOME INSECURITY: IN THE PAST 12 MONTHS HAS THE ELECTRIC, GAS, OIL, OR WATER COMPANY THREATENED TO SHUT OFF SERVICES IN YOUR HOME?: NO

## 2025-09-06 SDOH — SOCIAL STABILITY: SOCIAL INSECURITY
WITHIN THE LAST YEAR, HAVE YOU BEEN RAPED OR FORCED TO HAVE ANY KIND OF SEXUAL ACTIVITY BY YOUR PARTNER OR EX-PARTNER?: NO

## 2025-09-06 SDOH — ECONOMIC STABILITY: FOOD INSECURITY: WITHIN THE PAST 12 MONTHS, THE FOOD YOU BOUGHT JUST DIDN'T LAST AND YOU DIDN'T HAVE MONEY TO GET MORE.: NEVER TRUE

## 2025-09-06 SDOH — SOCIAL STABILITY: SOCIAL INSECURITY: ABUSE: ADULT

## 2025-09-06 SDOH — SOCIAL STABILITY: SOCIAL INSECURITY: HAS ANYONE EVER THREATENED TO HURT YOUR FAMILY OR YOUR PETS?: NO

## 2025-09-06 SDOH — SOCIAL STABILITY: SOCIAL INSECURITY: DOES ANYONE TRY TO KEEP YOU FROM HAVING/CONTACTING OTHER FRIENDS OR DOING THINGS OUTSIDE YOUR HOME?: NO

## 2025-09-06 SDOH — SOCIAL STABILITY: SOCIAL INSECURITY: ARE YOU OR HAVE YOU BEEN THREATENED OR ABUSED PHYSICALLY, EMOTIONALLY, OR SEXUALLY BY ANYONE?: NO

## 2025-09-06 SDOH — SOCIAL STABILITY: SOCIAL INSECURITY: DO YOU FEEL ANYONE HAS EXPLOITED OR TAKEN ADVANTAGE OF YOU FINANCIALLY OR OF YOUR PERSONAL PROPERTY?: NO

## 2025-09-06 ASSESSMENT — COGNITIVE AND FUNCTIONAL STATUS - GENERAL
DAILY ACTIVITIY SCORE: 24
PATIENT BASELINE BEDBOUND: NO
MOBILITY SCORE: 24
DAILY ACTIVITIY SCORE: 24
MOBILITY SCORE: 24

## 2025-09-06 ASSESSMENT — ACTIVITIES OF DAILY LIVING (ADL)
PATIENT'S MEMORY ADEQUATE TO SAFELY COMPLETE DAILY ACTIVITIES?: YES
WALKS IN HOME: INDEPENDENT
JUDGMENT_ADEQUATE_SAFELY_COMPLETE_DAILY_ACTIVITIES: YES
BATHING: INDEPENDENT
BATHING: INDEPENDENT
JUDGMENT_ADEQUATE_SAFELY_COMPLETE_DAILY_ACTIVITIES: YES
HEARING - LEFT EAR: FUNCTIONAL
LACK_OF_TRANSPORTATION: NO
TOILETING: INDEPENDENT
HEARING - LEFT EAR: FUNCTIONAL
GROOMING: INDEPENDENT
ADEQUATE_TO_COMPLETE_ADL: YES
DRESSING YOURSELF: INDEPENDENT
GROOMING: INDEPENDENT
TOILETING: INDEPENDENT
HEARING - RIGHT EAR: FUNCTIONAL
PATIENT'S MEMORY ADEQUATE TO SAFELY COMPLETE DAILY ACTIVITIES?: YES
FEEDING YOURSELF: INDEPENDENT
DRESSING YOURSELF: INDEPENDENT
HEARING - RIGHT EAR: FUNCTIONAL
WALKS IN HOME: INDEPENDENT
FEEDING YOURSELF: INDEPENDENT
ADEQUATE_TO_COMPLETE_ADL: YES

## 2025-09-06 ASSESSMENT — PATIENT HEALTH QUESTIONNAIRE - PHQ9
2. FEELING DOWN, DEPRESSED OR HOPELESS: NOT AT ALL
SUM OF ALL RESPONSES TO PHQ9 QUESTIONS 1 & 2: 0
1. LITTLE INTEREST OR PLEASURE IN DOING THINGS: NOT AT ALL

## 2025-09-06 ASSESSMENT — ENCOUNTER SYMPTOMS
DIARRHEA: 0
CONFUSION: 0
ABDOMINAL PAIN: 1
MYALGIAS: 0
FEVER: 0
HEMATURIA: 0
FLANK PAIN: 0
BLOOD IN STOOL: 0
WOUND: 0
ACTIVITY CHANGE: 0
DIZZINESS: 0
DIFFICULTY URINATING: 0
NAUSEA: 0
SHORTNESS OF BREATH: 0
COUGH: 0
CONSTIPATION: 1
FREQUENCY: 0
WEAKNESS: 0
CHEST TIGHTNESS: 0
VOMITING: 0
ABDOMINAL PAIN: 1
CHILLS: 0
DYSURIA: 0
PALPITATIONS: 0
HEADACHES: 0

## 2025-09-06 ASSESSMENT — PAIN DESCRIPTION - LOCATION: LOCATION: ABDOMEN

## 2025-09-06 ASSESSMENT — LIFESTYLE VARIABLES
HOW OFTEN DO YOU HAVE 6 OR MORE DRINKS ON ONE OCCASION: NEVER
HOW OFTEN DO YOU HAVE A DRINK CONTAINING ALCOHOL: NEVER
SKIP TO QUESTIONS 9-10: 1
HOW MANY STANDARD DRINKS CONTAINING ALCOHOL DO YOU HAVE ON A TYPICAL DAY: PATIENT DOES NOT DRINK
EVER HAD A DRINK FIRST THING IN THE MORNING TO STEADY YOUR NERVES TO GET RID OF A HANGOVER: NO
TOTAL SCORE: 0
SKIP TO QUESTIONS 9-10: 1
AUDIT-C TOTAL SCORE: 0
HAVE YOU EVER FELT YOU SHOULD CUT DOWN ON YOUR DRINKING: NO
HAVE PEOPLE ANNOYED YOU BY CRITICIZING YOUR DRINKING: NO
EVER FELT BAD OR GUILTY ABOUT YOUR DRINKING: NO
AUDIT-C TOTAL SCORE: 0
AUDIT-C TOTAL SCORE: 0

## 2025-09-06 ASSESSMENT — PAIN SCALES - GENERAL
PAINLEVEL_OUTOF10: 7
PAINLEVEL_OUTOF10: 0 - NO PAIN
PAINLEVEL_OUTOF10: 4
PAINLEVEL_OUTOF10: 7

## 2025-09-06 ASSESSMENT — PAIN DESCRIPTION - FREQUENCY: FREQUENCY: INTERMITTENT

## 2025-09-06 ASSESSMENT — PAIN DESCRIPTION - DESCRIPTORS: DESCRIPTORS: ACHING;CRAMPING

## 2025-09-06 ASSESSMENT — PAIN DESCRIPTION - ORIENTATION: ORIENTATION: MID;LOWER

## 2025-09-06 ASSESSMENT — PAIN - FUNCTIONAL ASSESSMENT
PAIN_FUNCTIONAL_ASSESSMENT: 0-10
PAIN_FUNCTIONAL_ASSESSMENT: 0-10

## 2025-09-06 ASSESSMENT — PAIN DESCRIPTION - ONSET: ONSET: ONGOING

## 2025-09-06 ASSESSMENT — PAIN DESCRIPTION - PROGRESSION: CLINICAL_PROGRESSION: NOT CHANGED

## 2025-09-06 ASSESSMENT — PAIN DESCRIPTION - PAIN TYPE: TYPE: ACUTE PAIN

## 2025-09-07 VITALS
DIASTOLIC BLOOD PRESSURE: 74 MMHG | RESPIRATION RATE: 20 BRPM | BODY MASS INDEX: 35.93 KG/M2 | SYSTOLIC BLOOD PRESSURE: 140 MMHG | WEIGHT: 251 LBS | OXYGEN SATURATION: 98 % | TEMPERATURE: 97.7 F | HEIGHT: 70 IN | HEART RATE: 81 BPM

## 2025-09-07 LAB
ANION GAP SERPL CALC-SCNC: 11 MMOL/L (ref 10–20)
BUN SERPL-MCNC: 29 MG/DL (ref 6–23)
CALCIUM SERPL-MCNC: 9 MG/DL (ref 8.6–10.3)
CHLORIDE SERPL-SCNC: 104 MMOL/L (ref 98–107)
CO2 SERPL-SCNC: 25 MMOL/L (ref 21–32)
CREAT SERPL-MCNC: 1.86 MG/DL (ref 0.5–1.3)
EGFRCR SERPLBLD CKD-EPI 2021: 38 ML/MIN/1.73M*2
ERYTHROCYTE [DISTWIDTH] IN BLOOD BY AUTOMATED COUNT: 13.8 % (ref 11.5–14.5)
GLUCOSE BLD MANUAL STRIP-MCNC: 133 MG/DL (ref 74–99)
GLUCOSE SERPL-MCNC: 148 MG/DL (ref 74–99)
HCT VFR BLD AUTO: 39 % (ref 41–52)
HGB BLD-MCNC: 12.7 G/DL (ref 13.5–17.5)
MAGNESIUM SERPL-MCNC: 2.18 MG/DL (ref 1.6–2.4)
MCH RBC QN AUTO: 28.7 PG (ref 26–34)
MCHC RBC AUTO-ENTMCNC: 32.6 G/DL (ref 32–36)
MCV RBC AUTO: 88 FL (ref 80–100)
NRBC BLD-RTO: 0 /100 WBCS (ref 0–0)
PLATELET # BLD AUTO: 231 X10*3/UL (ref 150–450)
POTASSIUM SERPL-SCNC: 4 MMOL/L (ref 3.5–5.3)
RBC # BLD AUTO: 4.42 X10*6/UL (ref 4.5–5.9)
SODIUM SERPL-SCNC: 136 MMOL/L (ref 136–145)
WBC # BLD AUTO: 8.8 X10*3/UL (ref 4.4–11.3)

## 2025-09-07 PROCEDURE — 83735 ASSAY OF MAGNESIUM: CPT | Performed by: STUDENT IN AN ORGANIZED HEALTH CARE EDUCATION/TRAINING PROGRAM

## 2025-09-07 PROCEDURE — 80048 BASIC METABOLIC PNL TOTAL CA: CPT | Performed by: STUDENT IN AN ORGANIZED HEALTH CARE EDUCATION/TRAINING PROGRAM

## 2025-09-07 PROCEDURE — G0378 HOSPITAL OBSERVATION PER HR: HCPCS

## 2025-09-07 PROCEDURE — 2500000004 HC RX 250 GENERAL PHARMACY W/ HCPCS (ALT 636 FOR OP/ED): Performed by: STUDENT IN AN ORGANIZED HEALTH CARE EDUCATION/TRAINING PROGRAM

## 2025-09-07 PROCEDURE — 96372 THER/PROPH/DIAG INJ SC/IM: CPT | Performed by: STUDENT IN AN ORGANIZED HEALTH CARE EDUCATION/TRAINING PROGRAM

## 2025-09-07 PROCEDURE — 85027 COMPLETE CBC AUTOMATED: CPT | Performed by: STUDENT IN AN ORGANIZED HEALTH CARE EDUCATION/TRAINING PROGRAM

## 2025-09-07 PROCEDURE — 82947 ASSAY GLUCOSE BLOOD QUANT: CPT

## 2025-09-07 PROCEDURE — 36415 COLL VENOUS BLD VENIPUNCTURE: CPT | Performed by: STUDENT IN AN ORGANIZED HEALTH CARE EDUCATION/TRAINING PROGRAM

## 2025-09-07 RX ORDER — INSULIN GLARGINE-YFGN 100 [IU]/ML
30 INJECTION, SOLUTION SUBCUTANEOUS EVERY 24 HOURS
Status: DISCONTINUED | OUTPATIENT
Start: 2025-09-07 | End: 2025-09-07 | Stop reason: HOSPADM

## 2025-09-07 RX ORDER — TAMSULOSIN HYDROCHLORIDE 0.4 MG/1
0.8 CAPSULE ORAL DAILY
Qty: 60 CAPSULE | Refills: 2 | Status: SHIPPED | OUTPATIENT
Start: 2025-09-07 | End: 2025-12-06

## 2025-09-07 RX ORDER — LOSARTAN POTASSIUM 100 MG/1
100 TABLET ORAL DAILY
Status: DISCONTINUED | OUTPATIENT
Start: 2025-09-07 | End: 2025-09-07 | Stop reason: HOSPADM

## 2025-09-07 RX ORDER — CETIRIZINE HYDROCHLORIDE 10 MG/1
10 TABLET ORAL DAILY
Status: DISCONTINUED | OUTPATIENT
Start: 2025-09-07 | End: 2025-09-07 | Stop reason: HOSPADM

## 2025-09-07 RX ADMIN — HEPARIN SODIUM 5000 UNITS: 5000 INJECTION, SOLUTION INTRAVENOUS; SUBCUTANEOUS at 05:18

## 2025-09-07 RX ADMIN — MORPHINE SULFATE 2 MG: 2 INJECTION, SOLUTION INTRAMUSCULAR; INTRAVENOUS at 00:35

## 2025-09-07 RX ADMIN — POLYETHYLENE GLYCOL 3350 17 G: 17 POWDER, FOR SOLUTION ORAL at 08:36

## 2025-09-07 ASSESSMENT — PAIN SCALES - GENERAL
PAINLEVEL_OUTOF10: 5 - MODERATE PAIN
PAINLEVEL_OUTOF10: 0 - NO PAIN
PAINLEVEL_OUTOF10: 0 - NO PAIN

## 2025-09-07 ASSESSMENT — PAIN DESCRIPTION - LOCATION: LOCATION: ABDOMEN

## 2025-09-07 ASSESSMENT — PAIN - FUNCTIONAL ASSESSMENT
PAIN_FUNCTIONAL_ASSESSMENT: 0-10

## (undated) DEVICE — DRAPE, INCISE, ANTIMICROBIAL, IOBAN 2, LARGE, 17 X 23 IN, DISPOSABLE, STERILE

## (undated) DEVICE — MANIFOLD, 4 PORT NEPTUNE STANDARD

## (undated) DEVICE — ANTIFOG, SOLUTION, FOG-OUT

## (undated) DEVICE — DRESSING, ISLAND, TELFA, 4 X 5 IN

## (undated) DEVICE — ROLL, DENTAL, 3/8 X 1-1/2, STERILE, 5/PK

## (undated) DEVICE — STRIP, SKIN CLOSURE, STERI STRIP, REINFORCED, 0.5 X 4 IN

## (undated) DEVICE — SUTURE, VICRYL, 2-0, 36 IN, CT-1, UNDYED

## (undated) DEVICE — COVER, LIGHT HANDLE, SURGICAL, FLEXIBLE, DISPOSABLE, STERILE

## (undated) DEVICE — STAPLER,  LINEAR ENDO 60MM RELOAD, BLACK, DISP

## (undated) DEVICE — COVER, CART, 45 X 27 X 48 IN, CLEAR

## (undated) DEVICE — SUTURE, MONOCRYL, 3-0, 18 IN, PS2, UNDYED

## (undated) DEVICE — COVER, TABLE, UHC

## (undated) DEVICE — GOWN, ASTOUND, XL

## (undated) DEVICE — SHEET, SPLIT, CARDIOVASCULAR TIBURON

## (undated) DEVICE — SUTURE, SILK, 2-0, 30 IN, SH, BLACK

## (undated) DEVICE — DRAPE, TIBURON, SPLIT SHEET, REINF ADH STRIP, 77X122

## (undated) DEVICE — TRAY, MINOR, SINGLE BASIN, STERILE

## (undated) DEVICE — SPONGE, HEMOSTATIC, CELLULOSE, SURGICEL, 2 X 14 IN

## (undated) DEVICE — COLLECTION UNIT, DRAINAGE, THORACIC, SINGLE TUBE, DRY SUCTION, ATS COMPATIBLE, OASIS 3600, LF

## (undated) DEVICE — SUTURE, PROLENE, 1, 30 IN, CT-1, BLUE

## (undated) DEVICE — DRESSING, GAUZE, PETROLATUM, VASELINE, 3 X 9 IN, STERILE

## (undated) DEVICE — DRESSING, ADHESIVE, ISLAND, TELFA, 2 X 3.75 IN, LF

## (undated) DEVICE — INSTRUMENT, DISSECTING, ENDOSCOPIC, PEANUT, 5 MM, STERILE

## (undated) DEVICE — CATHETER, THORACIC, STRAIGHT, ADULT, 28 FR, PVC

## (undated) DEVICE — CATHETER TRAY, SURESTEP, 16FR, URINE METER W/STATLOCK

## (undated) DEVICE — TUBING, SUCTION, CONNECTING, STERILE 0.25 X 120 IN., LF

## (undated) DEVICE — SUTURE, VICRYL, 2-0, 27 IN, UR-6, VIOLET

## (undated) DEVICE — SURGISLEEVE, WOUND PROTECTOR, SMALL 2.5-6CM

## (undated) DEVICE — Device

## (undated) DEVICE — SUTURE, SILK, 0, 24 IN, SUTUPAK, BLACK

## (undated) DEVICE — TIP, SUCTION, YANKAUER, BULB, ADULT

## (undated) DEVICE — SPONGE, DISSECTOR, PEANUT, 3/8, STERILE 5 FOAM HOLDER"